# Patient Record
Sex: MALE | Race: AMERICAN INDIAN OR ALASKA NATIVE | HISPANIC OR LATINO | Employment: FULL TIME | URBAN - METROPOLITAN AREA
[De-identification: names, ages, dates, MRNs, and addresses within clinical notes are randomized per-mention and may not be internally consistent; named-entity substitution may affect disease eponyms.]

---

## 2023-01-22 ENCOUNTER — APPOINTMENT (OUTPATIENT)
Dept: RADIOLOGY | Facility: MEDICAL CENTER | Age: 58
End: 2023-01-22
Payer: COMMERCIAL

## 2023-01-22 ENCOUNTER — HOSPITAL ENCOUNTER (INPATIENT)
Facility: MEDICAL CENTER | Age: 58
LOS: 8 days | DRG: 442 | End: 2023-01-30
Attending: STUDENT IN AN ORGANIZED HEALTH CARE EDUCATION/TRAINING PROGRAM | Admitting: STUDENT IN AN ORGANIZED HEALTH CARE EDUCATION/TRAINING PROGRAM
Payer: COMMERCIAL

## 2023-01-22 DIAGNOSIS — R78.81 BACTEREMIA: ICD-10-CM

## 2023-01-22 DIAGNOSIS — K75.0 HEPATIC ABSCESS: ICD-10-CM

## 2023-01-22 DIAGNOSIS — E11.9 NEW ONSET TYPE 2 DIABETES MELLITUS (HCC): ICD-10-CM

## 2023-01-22 PROBLEM — R94.31 ABNORMAL EKG: Status: ACTIVE | Noted: 2023-01-22

## 2023-01-22 PROBLEM — E87.1 HYPONATREMIA: Status: ACTIVE | Noted: 2023-01-22

## 2023-01-22 PROBLEM — A41.9 SEPSIS (HCC): Status: ACTIVE | Noted: 2023-01-22

## 2023-01-22 PROBLEM — R73.9 HYPERGLYCEMIA: Status: ACTIVE | Noted: 2023-01-22

## 2023-01-22 PROBLEM — R79.89 POSITIVE D DIMER: Status: ACTIVE | Noted: 2023-01-22

## 2023-01-22 LAB
ALBUMIN SERPL BCP-MCNC: 3.1 G/DL (ref 3.2–4.9)
ALBUMIN/GLOB SERPL: 0.7 G/DL
ALP SERPL-CCNC: 84 U/L (ref 30–99)
ALT SERPL-CCNC: 37 U/L (ref 2–50)
ANION GAP SERPL CALC-SCNC: 13 MMOL/L (ref 7–16)
AST SERPL-CCNC: 19 U/L (ref 12–45)
BASOPHILS # BLD AUTO: 0.3 % (ref 0–1.8)
BASOPHILS # BLD: 0.03 K/UL (ref 0–0.12)
BILIRUB SERPL-MCNC: 0.7 MG/DL (ref 0.1–1.5)
BUN SERPL-MCNC: 15 MG/DL (ref 8–22)
CALCIUM ALBUM COR SERPL-MCNC: 9.7 MG/DL (ref 8.5–10.5)
CALCIUM SERPL-MCNC: 9 MG/DL (ref 8.5–10.5)
CHLORIDE SERPL-SCNC: 94 MMOL/L (ref 96–112)
CO2 SERPL-SCNC: 24 MMOL/L (ref 20–33)
CREAT SERPL-MCNC: 0.78 MG/DL (ref 0.5–1.4)
EOSINOPHIL # BLD AUTO: 0.02 K/UL (ref 0–0.51)
EOSINOPHIL NFR BLD: 0.2 % (ref 0–6.9)
ERYTHROCYTE [DISTWIDTH] IN BLOOD BY AUTOMATED COUNT: 41.7 FL (ref 35.9–50)
EST. AVERAGE GLUCOSE BLD GHB EST-MCNC: 286 MG/DL
GFR SERPLBLD CREATININE-BSD FMLA CKD-EPI: 102 ML/MIN/1.73 M 2
GLOBULIN SER CALC-MCNC: 4.2 G/DL (ref 1.9–3.5)
GLUCOSE BLD STRIP.AUTO-MCNC: 246 MG/DL (ref 65–99)
GLUCOSE SERPL-MCNC: 250 MG/DL (ref 65–99)
HBA1C MFR BLD: 11.6 % (ref 4–5.6)
HCT VFR BLD AUTO: 42 % (ref 42–52)
HGB BLD-MCNC: 13.8 G/DL (ref 14–18)
IMM GRANULOCYTES # BLD AUTO: 0.2 K/UL (ref 0–0.11)
IMM GRANULOCYTES NFR BLD AUTO: 1.7 % (ref 0–0.9)
INR PPP: 1.19 (ref 0.87–1.13)
LACTATE SERPL-SCNC: 1.4 MMOL/L (ref 0.5–2)
LYMPHOCYTES # BLD AUTO: 1.11 K/UL (ref 1–4.8)
LYMPHOCYTES NFR BLD: 9.6 % (ref 22–41)
MAGNESIUM SERPL-MCNC: 1.9 MG/DL (ref 1.5–2.5)
MCH RBC QN AUTO: 29.7 PG (ref 27–33)
MCHC RBC AUTO-ENTMCNC: 32.9 G/DL (ref 33.7–35.3)
MCV RBC AUTO: 90.3 FL (ref 81.4–97.8)
MONOCYTES # BLD AUTO: 0.74 K/UL (ref 0–0.85)
MONOCYTES NFR BLD AUTO: 6.4 % (ref 0–13.4)
NEUTROPHILS # BLD AUTO: 9.42 K/UL (ref 1.82–7.42)
NEUTROPHILS NFR BLD: 81.8 % (ref 44–72)
NRBC # BLD AUTO: 0 K/UL
NRBC BLD-RTO: 0 /100 WBC
PHOSPHATE SERPL-MCNC: 3 MG/DL (ref 2.5–4.5)
PLATELET # BLD AUTO: 279 K/UL (ref 164–446)
PMV BLD AUTO: 9.9 FL (ref 9–12.9)
POTASSIUM SERPL-SCNC: 4.4 MMOL/L (ref 3.6–5.5)
PROT SERPL-MCNC: 7.3 G/DL (ref 6–8.2)
PROTHROMBIN TIME: 15 SEC (ref 12–14.6)
RBC # BLD AUTO: 4.65 M/UL (ref 4.7–6.1)
SODIUM SERPL-SCNC: 131 MMOL/L (ref 135–145)
WBC # BLD AUTO: 11.5 K/UL (ref 4.8–10.8)

## 2023-01-22 PROCEDURE — 99222 1ST HOSP IP/OBS MODERATE 55: CPT | Mod: GC | Performed by: STUDENT IN AN ORGANIZED HEALTH CARE EDUCATION/TRAINING PROGRAM

## 2023-01-22 PROCEDURE — 80053 COMPREHEN METABOLIC PANEL: CPT

## 2023-01-22 PROCEDURE — 83605 ASSAY OF LACTIC ACID: CPT

## 2023-01-22 PROCEDURE — 85610 PROTHROMBIN TIME: CPT

## 2023-01-22 PROCEDURE — 87040 BLOOD CULTURE FOR BACTERIA: CPT

## 2023-01-22 PROCEDURE — 85025 COMPLETE CBC W/AUTO DIFF WBC: CPT

## 2023-01-22 PROCEDURE — 84100 ASSAY OF PHOSPHORUS: CPT

## 2023-01-22 PROCEDURE — 83735 ASSAY OF MAGNESIUM: CPT

## 2023-01-22 PROCEDURE — 700102 HCHG RX REV CODE 250 W/ 637 OVERRIDE(OP): Performed by: STUDENT IN AN ORGANIZED HEALTH CARE EDUCATION/TRAINING PROGRAM

## 2023-01-22 PROCEDURE — 770006 HCHG ROOM/CARE - MED/SURG/GYN SEMI*

## 2023-01-22 PROCEDURE — 83036 HEMOGLOBIN GLYCOSYLATED A1C: CPT

## 2023-01-22 PROCEDURE — 700111 HCHG RX REV CODE 636 W/ 250 OVERRIDE (IP): Performed by: STUDENT IN AN ORGANIZED HEALTH CARE EDUCATION/TRAINING PROGRAM

## 2023-01-22 PROCEDURE — 87077 CULTURE AEROBIC IDENTIFY: CPT

## 2023-01-22 PROCEDURE — 700105 HCHG RX REV CODE 258: Performed by: STUDENT IN AN ORGANIZED HEALTH CARE EDUCATION/TRAINING PROGRAM

## 2023-01-22 PROCEDURE — 36415 COLL VENOUS BLD VENIPUNCTURE: CPT

## 2023-01-22 PROCEDURE — 93005 ELECTROCARDIOGRAM TRACING: CPT | Performed by: STUDENT IN AN ORGANIZED HEALTH CARE EDUCATION/TRAINING PROGRAM

## 2023-01-22 PROCEDURE — 82962 GLUCOSE BLOOD TEST: CPT

## 2023-01-22 RX ORDER — POLYETHYLENE GLYCOL 3350 17 G/17G
1 POWDER, FOR SOLUTION ORAL
Status: DISCONTINUED | OUTPATIENT
Start: 2023-01-22 | End: 2023-01-30 | Stop reason: HOSPADM

## 2023-01-22 RX ORDER — PROCHLORPERAZINE EDISYLATE 5 MG/ML
5-10 INJECTION INTRAMUSCULAR; INTRAVENOUS EVERY 4 HOURS PRN
Status: DISCONTINUED | OUTPATIENT
Start: 2023-01-22 | End: 2023-01-30 | Stop reason: HOSPADM

## 2023-01-22 RX ORDER — ACETAMINOPHEN 325 MG/1
650 TABLET ORAL EVERY 6 HOURS PRN
Status: DISCONTINUED | OUTPATIENT
Start: 2023-01-22 | End: 2023-01-28

## 2023-01-22 RX ORDER — BISACODYL 10 MG
10 SUPPOSITORY, RECTAL RECTAL
Status: DISCONTINUED | OUTPATIENT
Start: 2023-01-22 | End: 2023-01-30 | Stop reason: HOSPADM

## 2023-01-22 RX ORDER — ONDANSETRON 2 MG/ML
4 INJECTION INTRAMUSCULAR; INTRAVENOUS EVERY 4 HOURS PRN
Status: DISCONTINUED | OUTPATIENT
Start: 2023-01-22 | End: 2023-01-30 | Stop reason: HOSPADM

## 2023-01-22 RX ORDER — PROMETHAZINE HYDROCHLORIDE 12.5 MG/1
12.5-25 SUPPOSITORY RECTAL EVERY 4 HOURS PRN
Status: DISCONTINUED | OUTPATIENT
Start: 2023-01-22 | End: 2023-01-30 | Stop reason: HOSPADM

## 2023-01-22 RX ORDER — SODIUM CHLORIDE, SODIUM LACTATE, POTASSIUM CHLORIDE, AND CALCIUM CHLORIDE .6; .31; .03; .02 G/100ML; G/100ML; G/100ML; G/100ML
1000 INJECTION, SOLUTION INTRAVENOUS ONCE
Status: COMPLETED | OUTPATIENT
Start: 2023-01-22 | End: 2023-01-22

## 2023-01-22 RX ORDER — PROMETHAZINE HYDROCHLORIDE 25 MG/1
12.5-25 TABLET ORAL EVERY 4 HOURS PRN
Status: DISCONTINUED | OUTPATIENT
Start: 2023-01-22 | End: 2023-01-30 | Stop reason: HOSPADM

## 2023-01-22 RX ORDER — ONDANSETRON 4 MG/1
4 TABLET, ORALLY DISINTEGRATING ORAL EVERY 4 HOURS PRN
Status: DISCONTINUED | OUTPATIENT
Start: 2023-01-22 | End: 2023-01-30 | Stop reason: HOSPADM

## 2023-01-22 RX ORDER — AMOXICILLIN 250 MG
2 CAPSULE ORAL 2 TIMES DAILY
Status: DISCONTINUED | OUTPATIENT
Start: 2023-01-22 | End: 2023-01-30 | Stop reason: HOSPADM

## 2023-01-22 RX ADMIN — INSULIN HUMAN 2 UNITS: 100 INJECTION, SOLUTION PARENTERAL at 21:16

## 2023-01-22 RX ADMIN — SODIUM CHLORIDE, POTASSIUM CHLORIDE, SODIUM LACTATE AND CALCIUM CHLORIDE 1000 ML: 600; 310; 30; 20 INJECTION, SOLUTION INTRAVENOUS at 21:05

## 2023-01-22 RX ADMIN — PIPERACILLIN AND TAZOBACTAM 4.5 G: 4; .5 INJECTION, POWDER, LYOPHILIZED, FOR SOLUTION INTRAVENOUS; PARENTERAL at 22:09

## 2023-01-22 ASSESSMENT — PATIENT HEALTH QUESTIONNAIRE - PHQ9
2. FEELING DOWN, DEPRESSED, IRRITABLE, OR HOPELESS: NOT AT ALL
1. LITTLE INTEREST OR PLEASURE IN DOING THINGS: NOT AT ALL
SUM OF ALL RESPONSES TO PHQ9 QUESTIONS 1 AND 2: 0

## 2023-01-22 ASSESSMENT — ENCOUNTER SYMPTOMS
SHORTNESS OF BREATH: 0
PALPITATIONS: 0
CHILLS: 0
COUGH: 0
STRIDOR: 0
BRUISES/BLEEDS EASILY: 0
HEADACHES: 0
CONSTIPATION: 0
DIZZINESS: 0
VOMITING: 0
MYALGIAS: 0
BACK PAIN: 0
FLANK PAIN: 1
POLYDIPSIA: 0
INSOMNIA: 0
DIARRHEA: 0
NAUSEA: 0
DOUBLE VISION: 0
FEVER: 1
ABDOMINAL PAIN: 1
BLURRED VISION: 0

## 2023-01-22 ASSESSMENT — COGNITIVE AND FUNCTIONAL STATUS - GENERAL
SUGGESTED CMS G CODE MODIFIER DAILY ACTIVITY: CI
DAILY ACTIVITIY SCORE: 23
MOBILITY SCORE: 23
CLIMB 3 TO 5 STEPS WITH RAILING: A LITTLE
DRESSING REGULAR LOWER BODY CLOTHING: A LITTLE
SUGGESTED CMS G CODE MODIFIER MOBILITY: CI

## 2023-01-22 ASSESSMENT — LIFESTYLE VARIABLES
DOES PATIENT WANT TO STOP DRINKING: NO
TOTAL SCORE: 0
EVER FELT BAD OR GUILTY ABOUT YOUR DRINKING: NO
HAVE YOU EVER FELT YOU SHOULD CUT DOWN ON YOUR DRINKING: NO
ON A TYPICAL DAY WHEN YOU DRINK ALCOHOL HOW MANY DRINKS DO YOU HAVE: 3
HOW MANY TIMES IN THE PAST YEAR HAVE YOU HAD 5 OR MORE DRINKS IN A DAY: 0
SUBSTANCE_ABUSE: 0
TOTAL SCORE: 0
HAVE PEOPLE ANNOYED YOU BY CRITICIZING YOUR DRINKING: NO
EVER HAD A DRINK FIRST THING IN THE MORNING TO STEADY YOUR NERVES TO GET RID OF A HANGOVER: NO
ALCOHOL_USE: YES
AVERAGE NUMBER OF DAYS PER WEEK YOU HAVE A DRINK CONTAINING ALCOHOL: 1
CONSUMPTION TOTAL: NEGATIVE
TOTAL SCORE: 0

## 2023-01-22 ASSESSMENT — PAIN DESCRIPTION - PAIN TYPE: TYPE: ACUTE PAIN

## 2023-01-23 ENCOUNTER — APPOINTMENT (OUTPATIENT)
Dept: RADIOLOGY | Facility: MEDICAL CENTER | Age: 58
DRG: 442 | End: 2023-01-23
Attending: STUDENT IN AN ORGANIZED HEALTH CARE EDUCATION/TRAINING PROGRAM
Payer: COMMERCIAL

## 2023-01-23 PROBLEM — E11.9 NEW ONSET TYPE 2 DIABETES MELLITUS (HCC): Status: ACTIVE | Noted: 2023-01-22

## 2023-01-23 PROBLEM — R93.89 ABNORMAL CT OF THE CHEST: Status: ACTIVE | Noted: 2023-01-22

## 2023-01-23 LAB
CHOLEST SERPL-MCNC: 143 MG/DL (ref 100–199)
EKG IMPRESSION: NORMAL
GLUCOSE BLD STRIP.AUTO-MCNC: 200 MG/DL (ref 65–99)
GLUCOSE BLD STRIP.AUTO-MCNC: 214 MG/DL (ref 65–99)
GLUCOSE BLD STRIP.AUTO-MCNC: 240 MG/DL (ref 65–99)
GLUCOSE BLD STRIP.AUTO-MCNC: 249 MG/DL (ref 65–99)
GRAM STN SPEC: NORMAL
HDLC SERPL-MCNC: 25 MG/DL
LDLC SERPL CALC-MCNC: 89 MG/DL
SIGNIFICANT IND 70042: NORMAL
SITE SITE: NORMAL
SOURCE SOURCE: NORMAL
TRIGL SERPL-MCNC: 145 MG/DL (ref 0–149)

## 2023-01-23 PROCEDURE — A9270 NON-COVERED ITEM OR SERVICE: HCPCS | Performed by: NURSE PRACTITIONER

## 2023-01-23 PROCEDURE — 87070 CULTURE OTHR SPECIMN AEROBIC: CPT

## 2023-01-23 PROCEDURE — 87205 SMEAR GRAM STAIN: CPT

## 2023-01-23 PROCEDURE — 36415 COLL VENOUS BLD VENIPUNCTURE: CPT

## 2023-01-23 PROCEDURE — 700111 HCHG RX REV CODE 636 W/ 250 OVERRIDE (IP): Performed by: NURSE PRACTITIONER

## 2023-01-23 PROCEDURE — 700111 HCHG RX REV CODE 636 W/ 250 OVERRIDE (IP)

## 2023-01-23 PROCEDURE — 700111 HCHG RX REV CODE 636 W/ 250 OVERRIDE (IP): Performed by: STUDENT IN AN ORGANIZED HEALTH CARE EDUCATION/TRAINING PROGRAM

## 2023-01-23 PROCEDURE — 770006 HCHG ROOM/CARE - MED/SURG/GYN SEMI*

## 2023-01-23 PROCEDURE — 93010 ELECTROCARDIOGRAM REPORT: CPT | Performed by: INTERNAL MEDICINE

## 2023-01-23 PROCEDURE — 0F9130Z DRAINAGE OF RIGHT LOBE LIVER WITH DRAINAGE DEVICE, PERCUTANEOUS APPROACH: ICD-10-PCS | Performed by: RADIOLOGY

## 2023-01-23 PROCEDURE — 700111 HCHG RX REV CODE 636 W/ 250 OVERRIDE (IP): Performed by: RADIOLOGY

## 2023-01-23 PROCEDURE — 80061 LIPID PANEL: CPT

## 2023-01-23 PROCEDURE — 87077 CULTURE AEROBIC IDENTIFY: CPT

## 2023-01-23 PROCEDURE — A9270 NON-COVERED ITEM OR SERVICE: HCPCS | Performed by: STUDENT IN AN ORGANIZED HEALTH CARE EDUCATION/TRAINING PROGRAM

## 2023-01-23 PROCEDURE — 99152 MOD SED SAME PHYS/QHP 5/>YRS: CPT

## 2023-01-23 PROCEDURE — 99232 SBSQ HOSP IP/OBS MODERATE 35: CPT | Performed by: INTERNAL MEDICINE

## 2023-01-23 PROCEDURE — 700102 HCHG RX REV CODE 250 W/ 637 OVERRIDE(OP): Performed by: NURSE PRACTITIONER

## 2023-01-23 PROCEDURE — 700102 HCHG RX REV CODE 250 W/ 637 OVERRIDE(OP): Performed by: STUDENT IN AN ORGANIZED HEALTH CARE EDUCATION/TRAINING PROGRAM

## 2023-01-23 PROCEDURE — 700105 HCHG RX REV CODE 258: Performed by: STUDENT IN AN ORGANIZED HEALTH CARE EDUCATION/TRAINING PROGRAM

## 2023-01-23 PROCEDURE — 82962 GLUCOSE BLOOD TEST: CPT | Mod: 91

## 2023-01-23 RX ORDER — MIDAZOLAM HYDROCHLORIDE 1 MG/ML
.5-2 INJECTION INTRAMUSCULAR; INTRAVENOUS PRN
Status: ACTIVE | OUTPATIENT
Start: 2023-01-23 | End: 2023-01-23

## 2023-01-23 RX ORDER — MIDAZOLAM HYDROCHLORIDE 1 MG/ML
INJECTION INTRAMUSCULAR; INTRAVENOUS
Status: COMPLETED
Start: 2023-01-23 | End: 2023-01-23

## 2023-01-23 RX ORDER — ONDANSETRON 2 MG/ML
4 INJECTION INTRAMUSCULAR; INTRAVENOUS PRN
Status: ACTIVE | OUTPATIENT
Start: 2023-01-23 | End: 2023-01-23

## 2023-01-23 RX ORDER — HYDROMORPHONE HYDROCHLORIDE 1 MG/ML
0.5 INJECTION, SOLUTION INTRAMUSCULAR; INTRAVENOUS; SUBCUTANEOUS ONCE
Status: COMPLETED | OUTPATIENT
Start: 2023-01-23 | End: 2023-01-23

## 2023-01-23 RX ORDER — SODIUM CHLORIDE 9 MG/ML
500 INJECTION, SOLUTION INTRAVENOUS
Status: ACTIVE | OUTPATIENT
Start: 2023-01-23 | End: 2023-01-23

## 2023-01-23 RX ORDER — HYDROCODONE BITARTRATE AND ACETAMINOPHEN 5; 325 MG/1; MG/1
1 TABLET ORAL EVERY 6 HOURS PRN
Status: DISCONTINUED | OUTPATIENT
Start: 2023-01-23 | End: 2023-01-24

## 2023-01-23 RX ADMIN — PIPERACILLIN AND TAZOBACTAM 4.5 G: 4; .5 INJECTION, POWDER, LYOPHILIZED, FOR SOLUTION INTRAVENOUS; PARENTERAL at 00:30

## 2023-01-23 RX ADMIN — HYDROCODONE BITARTRATE AND ACETAMINOPHEN 1 TABLET: 5; 325 TABLET ORAL at 21:06

## 2023-01-23 RX ADMIN — MIDAZOLAM HYDROCHLORIDE 1 MG: 1 INJECTION, SOLUTION INTRAMUSCULAR; INTRAVENOUS at 17:15

## 2023-01-23 RX ADMIN — PIPERACILLIN AND TAZOBACTAM 4.5 G: 4; .5 INJECTION, POWDER, LYOPHILIZED, FOR SOLUTION INTRAVENOUS; PARENTERAL at 05:45

## 2023-01-23 RX ADMIN — INSULIN HUMAN 1 UNITS: 100 INJECTION, SOLUTION PARENTERAL at 18:09

## 2023-01-23 RX ADMIN — MIDAZOLAM HYDROCHLORIDE 1 MG: 1 INJECTION, SOLUTION INTRAMUSCULAR; INTRAVENOUS at 17:07

## 2023-01-23 RX ADMIN — INSULIN HUMAN 2 UNITS: 100 INJECTION, SOLUTION PARENTERAL at 08:34

## 2023-01-23 RX ADMIN — INSULIN HUMAN 2 UNITS: 100 INJECTION, SOLUTION PARENTERAL at 21:19

## 2023-01-23 RX ADMIN — FENTANYL CITRATE 50 MCG: 50 INJECTION, SOLUTION INTRAMUSCULAR; INTRAVENOUS at 17:08

## 2023-01-23 RX ADMIN — FENTANYL CITRATE 50 MCG: 50 INJECTION, SOLUTION INTRAMUSCULAR; INTRAVENOUS at 17:15

## 2023-01-23 RX ADMIN — PIPERACILLIN AND TAZOBACTAM 4.5 G: 4; .5 INJECTION, POWDER, LYOPHILIZED, FOR SOLUTION INTRAVENOUS; PARENTERAL at 21:16

## 2023-01-23 RX ADMIN — INSULIN HUMAN 2 UNITS: 100 INJECTION, SOLUTION PARENTERAL at 12:29

## 2023-01-23 RX ADMIN — SENNOSIDES AND DOCUSATE SODIUM 2 TABLET: 50; 8.6 TABLET ORAL at 18:09

## 2023-01-23 RX ADMIN — HYDROMORPHONE HYDROCHLORIDE 0.5 MG: 1 INJECTION, SOLUTION INTRAMUSCULAR; INTRAVENOUS; SUBCUTANEOUS at 18:28

## 2023-01-23 RX ADMIN — PIPERACILLIN AND TAZOBACTAM 4.5 G: 4; .5 INJECTION, POWDER, LYOPHILIZED, FOR SOLUTION INTRAVENOUS; PARENTERAL at 12:37

## 2023-01-23 ASSESSMENT — PAIN DESCRIPTION - PAIN TYPE
TYPE: ACUTE PAIN
TYPE: ACUTE PAIN

## 2023-01-23 ASSESSMENT — ENCOUNTER SYMPTOMS
ABDOMINAL PAIN: 1
FEVER: 0
NAUSEA: 1
ROS GI COMMENTS: POOR APPETITE
VOMITING: 0
CHILLS: 0

## 2023-01-23 NOTE — H&P
"[facility] Internal Medicine History & Physical Note    Date of Service  1/22/2023    [facility] Team: Night team  Attending: Ashutosh Pulido M.d.  Senior Resident: Dr. Quiñonez  Intern:  n/a  Contact Number: [phone]    Primary Care Physician  No primary care provider on file.    Consultants  IR - consult order entered    Specialist Names: n/a    Code Status  Full Code    Chief Complaint  Right-sided abdominal pain, fatigue, and subjective fever    History of Presenting Illness (HPI):   Tejas Yusuf is a 57 y.o. male with a past medical history of uncomplicated diverticulitis in the 2000's who presented 1/22/2023 with right-sided abdominal pain, fatigue, and subjective fever.  Of note, he has not seen a physician for \"many years.\"  Initial symptom was fatigue onset about 1 week ago.  3 days later developed sharp, nonradiating, right-sided flank/abdominal pain.  He then began experiencing subjective fevers and diminished appetite.  He denies any nausea, vomiting, weight loss, constipation, or diarrhea.  Last bowel movement was this morning.  Denies any melena or hematochezia.  He denies any dysuria or urethral discharge however has noticed increased frequency over the past 1 to 2 days.  He has been drinking \"gallons\" of fruit juice.  He denies any significant alcohol use; drinks 2 glasses of wine per week.  Denies any recent foreign travel or sick contacts.    At the outside ED, he was found to be septic with fever up to 101, WBC 14, and glucose 373.  UA was positive for ketones but negative for UTI.  CT abdomen pelvis with contrast there revealed a 6.5 cm hypodense liver lesion concerning for an abscess.  They also performed a CT PE (high D-dimer) which was negative for pulmonary embolism or aortic dissection.  He was given 2 L NS bolus and started on Zosyn prior to transfer here.    I discussed the plan of care with patient, bedside RN, and Dr. Pulido .    Review of Systems  Review of Systems   Constitutional:  Positive for " fever and malaise/fatigue. Negative for chills.   HENT:  Negative for congestion and hearing loss.    Eyes:  Negative for blurred vision and double vision.   Respiratory:  Negative for cough, shortness of breath and stridor.    Cardiovascular:  Negative for chest pain and palpitations.   Gastrointestinal:  Positive for abdominal pain (right). Negative for constipation, diarrhea, nausea and vomiting.   Genitourinary:  Positive for flank pain (right) and frequency. Negative for dysuria and urgency.   Musculoskeletal:  Negative for back pain and myalgias.   Skin:  Negative for itching and rash.   Neurological:  Negative for dizziness and headaches.   Endo/Heme/Allergies:  Negative for polydipsia. Does not bruise/bleed easily.   Psychiatric/Behavioral:  Negative for substance abuse. The patient does not have insomnia.      Past Medical History   has no past medical history on file.  Uncomplicated diverticulitis and the 2000's    Surgical History   has no past surgical history on file.   Denies prior surgery    Family History  family history is not on file.   Reports hematochromatosis in father and brother  Family history reviewed with patient.     Social History  Tobacco: Denies  Alcohol: 2 glasses of wine per week  Recreational drugs (illegal or prescription): Denies  Employment: n/a  Living Situation: n/a  Recent Travel: Denies  Primary Care Provider: Reviewed Doesn't have a PCP  Other (stressors, spirituality, exposures): n/a    Allergies  No Known Allergies    Medications  None       Physical Exam  Temp:  [37.7 °C (99.8 °F)] 37.7 °C (99.8 °F)  Pulse:  [97] 97  Resp:  [18] 18  BP: (140)/(81) 140/81  SpO2:  [95 %] 95 %                          Physical Exam  Constitutional:       General: He is not in acute distress.     Appearance: He is obese. He is not diaphoretic.   HENT:      Head: Normocephalic and atraumatic.      Right Ear: External ear normal.      Left Ear: External ear normal.      Nose: Nose normal.       Mouth/Throat:      Mouth: Mucous membranes are moist.      Pharynx: Oropharynx is clear.   Eyes:      General: No scleral icterus.     Extraocular Movements: Extraocular movements intact.   Cardiovascular:      Rate and Rhythm: Regular rhythm. Tachycardia present.      Heart sounds: No murmur heard.  Pulmonary:      Effort: Pulmonary effort is normal. No respiratory distress.      Breath sounds: No wheezing or rales.   Abdominal:      Palpations: Abdomen is soft.      Tenderness: There is no abdominal tenderness. There is right CVA tenderness. There is no guarding or rebound.      Hernia: A hernia (umbilical hernia; nontender, soft) is present.      Comments: Toro's negative   Musculoskeletal:      Cervical back: Normal range of motion and neck supple.      Right lower leg: Edema (scant) present.      Left lower leg: Edema (scant) present.   Skin:     General: Skin is warm and dry.      Capillary Refill: Capillary refill takes less than 2 seconds.      Coloration: Skin is not jaundiced or pale.   Neurological:      Mental Status: He is alert and oriented to person, place, and time.      Coordination: Coordination normal.   Psychiatric:         Behavior: Behavior normal.         Thought Content: Thought content normal.       Laboratory:          No results for input(s): ALTSGPT, ASTSGOT, ALKPHOSPHAT, TBILIRUBIN, DBILIRUBIN, GAMMAGT, AMYLASE, LIPASE, ALB, PREALBUMIN, GLUCOSE in the last 72 hours.      No results for input(s): NTPROBNP in the last 72 hours.      No results for input(s): TROPONINT in the last 72 hours.    Imaging:  OUTSIDE IMAGES-CT CHEST   Final Result      OUTSIDE IMAGES-CT ABDOMEN /PELVIS   Final Result      IR-CONSULT AND TREAT    (Results Pending)       EKG:  I have personally reviewed the images and compared with prior images. and My impression is: Sinus, extreme axis, <1mm TERI in AVR, AVF, peaked T waves in V2, V3, V4, V5    Assessment/Plan:  Problem Representation:   Tejas Yusuf is a 57 y.o.  "male who hasn't seen a PCP in years with a past medical history of uncomplicated diverticulitis in the 2000's who presented to [facility] on 1/22/2023 with a 1 week history of right-sided, nonradiating flank pain, fatigue, and subjective fever.  Found to be septic with a 6.5 cm hypodense liver lesion concerning for a pyogenic liver abscess.  Transferred to [facility] on 1/22/2023 for evaluation for abscess drainage.  He is hemodynamically stable.    I anticipate this patient will require at least two midnights for appropriate medical management, necessitating inpatient admission because sepsis due to liver abscess    Patient will need a Med/Surg bed on MEDICAL service .  The need is secondary to sepsis.    * Hepatic abscess- (present on admission)  Assessment & Plan  Likely pyogenic liver abscess.  Differential includes malignancy.  1/22/2023 outside CT A/P with contrast: \"6.5 cm hypodense lesion posterior segment right lobe liver dome segment 7 representing abscess, hemangioma, metastatic disease, adenoma or hepatocellular carcinoma.\"    Continue Zosyn  IR consult for drainage ordered  NPO at [facility]    Sepsis (HCC)  Assessment & Plan  This is Sepsis Present on admission  SIRS criteria identified on my evaluation include: Tachycardia, with heart rate greater than 90 BPM and Leukocytosis, with WBC greater than 12,000  Source is liver abscess  Sepsis protocol initiated  Fluid resuscitation ordered per protocol  Crystalloid Fluid Administration: Fluid resuscitation ordered per standard protocol - 30 mL/kg per current or ideal body weight  IV antibiotics as appropriate for source of sepsis  Reassessment: I have reassessed the patient's hemodynamic status  Outside PCR SARS-CoV-2, RSV, influenza A/B negative  Outside UA noninfectious    Check lactic  Blood cultures ordered    Abnormal CT of the chest  Assessment & Plan  Outside CT showing minimal pericardial thickening anteriorly from mild pericarditis versus old " inflammation  Likely incidental finding  Slight inferior TERI on outside EKG with negative troponin  Denies chest pain, pressure, or dysnpea    If he develops chest pain, repeat ekg/trop    Positive D dimer  Assessment & Plan  Elevated D-dimer on outside labs  Outside CTA chest negative for PE    Hyponatremia  Assessment & Plan  Denies neurologic complaints  Likely due to hypovolemia  Trend labs. Workup if no improvement after fluids.    New onset type 2 diabetes mellitus (HCC)  Assessment & Plan  Elevated sugars on labs  No known hx of DM  A1c 11.6 this admission  SSI for now since he'll be NPO, accuchecks, goal sugars 140-180  Consider starting long acting insulin tomorrow  Hypoglycemia protocol  Diabetes education ordered        VTE prophylaxis: SCDs/TEDs

## 2023-01-23 NOTE — CARE PLAN
The patient is Stable - Low risk of patient condition declining or worsening    Shift Goals  Clinical Goals: IV abx  Patient Goals: rest  Family Goals: XOCHITL    Progress made toward(s) clinical / shift goals:  pt did not sustain injuries this shift, pt meds passed per MAR    Problem: Knowledge Deficit - Standard  Goal: Patient and family/care givers will demonstrate understanding of plan of care, disease process/condition, diagnostic tests and medications  Outcome: Progressing     Problem: Pain - Standard  Goal: Alleviation of pain or a reduction in pain to the patient’s comfort goal  Outcome: Progressing       Patient is not progressing towards the following goals:

## 2023-01-23 NOTE — CARE PLAN
The patient is Stable - Low risk of patient condition declining or worsening    Shift Goals  Clinical Goals: IV ATB  Patient Goals: Rest    Progress made toward(s) clinical / shift goals:  Pt received AxOx4, all VSS. See flowsheet for assessment details. All meds given as order. Diabetes consult was ordered and pt needs diabetes education. Will continue to monitor.

## 2023-01-23 NOTE — ASSESSMENT & PLAN NOTE
Elevated sugars on labs  No known hx of DM  A1c 11.6 this admission  SSI for now since he'll be NPO, accuchecks, goal sugars 140-180  Consider starting long acting insulin tomorrow  Hypoglycemia protocol  Diabetes education ordered

## 2023-01-23 NOTE — ASSESSMENT & PLAN NOTE
Outside CT showing minimal pericardial thickening anteriorly from mild pericarditis versus old inflammation  Likely incidental finding  Slight inferior TERI on outside EKG with negative troponin  Denies chest pain, pressure, or dysnpea    If he develops chest pain, repeat ekg/trop

## 2023-01-23 NOTE — ASSESSMENT & PLAN NOTE
This is Sepsis Present on admission  SIRS criteria identified on my evaluation include: Tachycardia, with heart rate greater than 90 BPM and Leukocytosis, with WBC greater than 12,000  Source is liver abscess  Sepsis protocol initiated  Fluid resuscitation ordered per protocol  Crystalloid Fluid Administration: Fluid resuscitation ordered per standard protocol - 30 mL/kg per current or ideal body weight  IV antibiotics as appropriate for source of sepsis  Reassessment: I have reassessed the patient's hemodynamic status  Outside PCR SARS-CoV-2, RSV, influenza A/B negative  Outside UA noninfectious    Check lactic  Blood cultures ordered

## 2023-01-23 NOTE — PROGRESS NOTES
[facility]PITAL[facility] TRIAGE OFFICER DIRECT ADMISSION REPORT  Transferring facility: [facility]  Transferring physician: Dr Gaming  Transferring facility/physician contact number: [phone]  Chief complaint: Liver abscess  Pertinent history & patient course:   Is a 57-year-old gentleman with a past medical history of diverticulitis (distant hx) who presented to outside hospital with 1 week of progressive right upper quadrant lower right chest pain, general malaise and persistent fever.  CT scan was done which shows a 6.5 cm hypodense lesion in the right lobe of the liver concerning for possible hepatic abscess.  Current facility does not have capabilities of IR drainage or ID/GI/surgical services thus requesting transfer for higher level of care.    Pertinent imaging & lab results:   Patient is hemodynamically stable, fever 101, WBC of 14, normal liver function tests, no other identified lab abnormalities aside from a glucose of 373 without a prior diagnosis of diabetes.  Did have an elevated D-dimer of 11.9, underwent CTA which was negative for pulmonary embolism.  -Noted to have some peaked T waves and minimal ST elevation on EKG suggestive of pericarditis however patient has no chest pain and a negative troponin  Code Status: Full per transferring provider, I personally verified with the transferring provider patient's code status and the transferring provider has confirmed this with the patient.  Further work up or recommendations per triage officer prior to transfer:   -Recommend MD to get blood cultures and initiate antibiotic therapy with Zosyn  -Treat hyperglycemia  Consultants called prior to transfer and pertinent input from consultants: N/A  Patient accepted for transfer: Yes  Consultants to be called upon arrival:   Patient may need IR for drainage, ID consultation  Admission status: Inpatient.   Floor requested: [facility]  If ICU transfer, name of intensivist case discussed with and  pertinent input from critical care: N/A    Please inform the triage officer upon arrival of the patient to [facility] for assignment of a hospitalist to perform admission.     For any question or concerns regarding the care of this patient, please reach out to the assigned hospitalist.

## 2023-01-23 NOTE — ASSESSMENT & PLAN NOTE
"Likely pyogenic liver abscess.  Differential includes malignancy.  1/22/2023 outside CT A/P with contrast: \"6.5 cm hypodense lesion posterior segment right lobe liver dome segment 7 representing abscess, hemangioma, metastatic disease, adenoma or hepatocellular carcinoma.\"    Continue Zosyn  IR consult for drainage ordered  NPO at [facility]  "

## 2023-01-23 NOTE — ASSESSMENT & PLAN NOTE
Denies neurologic complaints  Likely due to hypovolemia  Trend labs. Workup if no improvement after fluids.

## 2023-01-24 PROBLEM — R74.8 ELEVATED LIVER ENZYMES: Status: ACTIVE | Noted: 2023-01-24

## 2023-01-24 LAB
ALBUMIN SERPL BCP-MCNC: 2.9 G/DL (ref 3.2–4.9)
ALBUMIN/GLOB SERPL: 0.8 G/DL
ALP SERPL-CCNC: 92 U/L (ref 30–99)
ALT SERPL-CCNC: 61 U/L (ref 2–50)
ANION GAP SERPL CALC-SCNC: 10 MMOL/L (ref 7–16)
AST SERPL-CCNC: 68 U/L (ref 12–45)
BASOPHILS # BLD AUTO: 0.4 % (ref 0–1.8)
BASOPHILS # BLD: 0.04 K/UL (ref 0–0.12)
BILIRUB SERPL-MCNC: 0.5 MG/DL (ref 0.1–1.5)
BUN SERPL-MCNC: 13 MG/DL (ref 8–22)
CALCIUM ALBUM COR SERPL-MCNC: 9.6 MG/DL (ref 8.5–10.5)
CALCIUM SERPL-MCNC: 8.7 MG/DL (ref 8.5–10.5)
CHLORIDE SERPL-SCNC: 97 MMOL/L (ref 96–112)
CO2 SERPL-SCNC: 26 MMOL/L (ref 20–33)
CREAT SERPL-MCNC: 0.74 MG/DL (ref 0.5–1.4)
EOSINOPHIL # BLD AUTO: 0.07 K/UL (ref 0–0.51)
EOSINOPHIL NFR BLD: 0.6 % (ref 0–6.9)
ERYTHROCYTE [DISTWIDTH] IN BLOOD BY AUTOMATED COUNT: 42.4 FL (ref 35.9–50)
GFR SERPLBLD CREATININE-BSD FMLA CKD-EPI: 104 ML/MIN/1.73 M 2
GLOBULIN SER CALC-MCNC: 3.7 G/DL (ref 1.9–3.5)
GLUCOSE BLD STRIP.AUTO-MCNC: 216 MG/DL (ref 65–99)
GLUCOSE BLD STRIP.AUTO-MCNC: 252 MG/DL (ref 65–99)
GLUCOSE BLD STRIP.AUTO-MCNC: 306 MG/DL (ref 65–99)
GLUCOSE SERPL-MCNC: 215 MG/DL (ref 65–99)
HCT VFR BLD AUTO: 43.4 % (ref 42–52)
HGB BLD-MCNC: 14.3 G/DL (ref 14–18)
IMM GRANULOCYTES # BLD AUTO: 0.35 K/UL (ref 0–0.11)
IMM GRANULOCYTES NFR BLD AUTO: 3.1 % (ref 0–0.9)
LYMPHOCYTES # BLD AUTO: 1.44 K/UL (ref 1–4.8)
LYMPHOCYTES NFR BLD: 12.9 % (ref 22–41)
MCH RBC QN AUTO: 30.2 PG (ref 27–33)
MCHC RBC AUTO-ENTMCNC: 32.9 G/DL (ref 33.7–35.3)
MCV RBC AUTO: 91.6 FL (ref 81.4–97.8)
MONOCYTES # BLD AUTO: 0.69 K/UL (ref 0–0.85)
MONOCYTES NFR BLD AUTO: 6.2 % (ref 0–13.4)
NEUTROPHILS # BLD AUTO: 8.61 K/UL (ref 1.82–7.42)
NEUTROPHILS NFR BLD: 76.8 % (ref 44–72)
NRBC # BLD AUTO: 0 K/UL
NRBC BLD-RTO: 0 /100 WBC
PLATELET # BLD AUTO: 287 K/UL (ref 164–446)
PMV BLD AUTO: 10 FL (ref 9–12.9)
POTASSIUM SERPL-SCNC: 4.2 MMOL/L (ref 3.6–5.5)
PROT SERPL-MCNC: 6.6 G/DL (ref 6–8.2)
RBC # BLD AUTO: 4.74 M/UL (ref 4.7–6.1)
SODIUM SERPL-SCNC: 133 MMOL/L (ref 135–145)
WBC # BLD AUTO: 11.2 K/UL (ref 4.8–10.8)

## 2023-01-24 PROCEDURE — 80053 COMPREHEN METABOLIC PANEL: CPT

## 2023-01-24 PROCEDURE — A9270 NON-COVERED ITEM OR SERVICE: HCPCS | Performed by: STUDENT IN AN ORGANIZED HEALTH CARE EDUCATION/TRAINING PROGRAM

## 2023-01-24 PROCEDURE — 700111 HCHG RX REV CODE 636 W/ 250 OVERRIDE (IP): Performed by: STUDENT IN AN ORGANIZED HEALTH CARE EDUCATION/TRAINING PROGRAM

## 2023-01-24 PROCEDURE — 36415 COLL VENOUS BLD VENIPUNCTURE: CPT

## 2023-01-24 PROCEDURE — 82962 GLUCOSE BLOOD TEST: CPT

## 2023-01-24 PROCEDURE — 700102 HCHG RX REV CODE 250 W/ 637 OVERRIDE(OP): Performed by: STUDENT IN AN ORGANIZED HEALTH CARE EDUCATION/TRAINING PROGRAM

## 2023-01-24 PROCEDURE — 700102 HCHG RX REV CODE 250 W/ 637 OVERRIDE(OP): Performed by: NURSE PRACTITIONER

## 2023-01-24 PROCEDURE — 770006 HCHG ROOM/CARE - MED/SURG/GYN SEMI*

## 2023-01-24 PROCEDURE — 85025 COMPLETE CBC W/AUTO DIFF WBC: CPT

## 2023-01-24 PROCEDURE — 700105 HCHG RX REV CODE 258: Performed by: STUDENT IN AN ORGANIZED HEALTH CARE EDUCATION/TRAINING PROGRAM

## 2023-01-24 PROCEDURE — 99232 SBSQ HOSP IP/OBS MODERATE 35: CPT | Performed by: STUDENT IN AN ORGANIZED HEALTH CARE EDUCATION/TRAINING PROGRAM

## 2023-01-24 PROCEDURE — A9270 NON-COVERED ITEM OR SERVICE: HCPCS | Performed by: NURSE PRACTITIONER

## 2023-01-24 RX ORDER — HYDROCODONE BITARTRATE AND ACETAMINOPHEN 5; 325 MG/1; MG/1
1-2 TABLET ORAL EVERY 6 HOURS PRN
Status: COMPLETED | OUTPATIENT
Start: 2023-01-24 | End: 2023-01-26

## 2023-01-24 RX ORDER — HYDROMORPHONE HYDROCHLORIDE 1 MG/ML
1 INJECTION, SOLUTION INTRAMUSCULAR; INTRAVENOUS; SUBCUTANEOUS EVERY 8 HOURS PRN
Status: DISCONTINUED | OUTPATIENT
Start: 2023-01-24 | End: 2023-01-30 | Stop reason: HOSPADM

## 2023-01-24 RX ORDER — ENOXAPARIN SODIUM 100 MG/ML
40 INJECTION SUBCUTANEOUS DAILY
Status: DISCONTINUED | OUTPATIENT
Start: 2023-01-24 | End: 2023-01-30 | Stop reason: HOSPADM

## 2023-01-24 RX ADMIN — HYDROCODONE BITARTRATE AND ACETAMINOPHEN 1 TABLET: 5; 325 TABLET ORAL at 09:01

## 2023-01-24 RX ADMIN — PIPERACILLIN AND TAZOBACTAM 4.5 G: 4; .5 INJECTION, POWDER, LYOPHILIZED, FOR SOLUTION INTRAVENOUS; PARENTERAL at 13:47

## 2023-01-24 RX ADMIN — PIPERACILLIN AND TAZOBACTAM 4.5 G: 4; .5 INJECTION, POWDER, LYOPHILIZED, FOR SOLUTION INTRAVENOUS; PARENTERAL at 04:42

## 2023-01-24 RX ADMIN — ACETAMINOPHEN 650 MG: 325 TABLET ORAL at 04:50

## 2023-01-24 RX ADMIN — INSULIN HUMAN 4 UNITS: 100 INJECTION, SOLUTION PARENTERAL at 18:15

## 2023-01-24 RX ADMIN — INSULIN HUMAN 3 UNITS: 100 INJECTION, SOLUTION PARENTERAL at 12:10

## 2023-01-24 RX ADMIN — INSULIN HUMAN 4 UNITS: 100 INJECTION, SOLUTION PARENTERAL at 20:57

## 2023-01-24 RX ADMIN — INSULIN GLARGINE-YFGN 10 UNITS: 100 INJECTION, SOLUTION SUBCUTANEOUS at 18:15

## 2023-01-24 RX ADMIN — ENOXAPARIN SODIUM 40 MG: 40 INJECTION SUBCUTANEOUS at 18:13

## 2023-01-24 RX ADMIN — PIPERACILLIN AND TAZOBACTAM 4.5 G: 4; .5 INJECTION, POWDER, LYOPHILIZED, FOR SOLUTION INTRAVENOUS; PARENTERAL at 20:53

## 2023-01-24 RX ADMIN — SENNOSIDES AND DOCUSATE SODIUM 2 TABLET: 50; 8.6 TABLET ORAL at 21:05

## 2023-01-24 RX ADMIN — HYDROMORPHONE HYDROCHLORIDE 1 MG: 1 INJECTION, SOLUTION INTRAMUSCULAR; INTRAVENOUS; SUBCUTANEOUS at 13:46

## 2023-01-24 RX ADMIN — INSULIN HUMAN 2 UNITS: 100 INJECTION, SOLUTION PARENTERAL at 08:40

## 2023-01-24 ASSESSMENT — PAIN DESCRIPTION - PAIN TYPE
TYPE: ACUTE PAIN

## 2023-01-24 ASSESSMENT — ENCOUNTER SYMPTOMS
FEVER: 0
SHORTNESS OF BREATH: 0
HEADACHES: 0
VOMITING: 0
CHILLS: 0
ROS GI COMMENTS: POOR APPETITE
ABDOMINAL PAIN: 1
DEPRESSION: 0
WHEEZING: 0
NAUSEA: 1
EYE PAIN: 0

## 2023-01-24 ASSESSMENT — FIBROSIS 4 INDEX: FIB4 SCORE: 1.79

## 2023-01-24 NOTE — OR SURGEON
Immediate Post- Operative Note        PostOp Diagnosis: LIVER ABSCESS      Procedure(s): CT GUIDED CATHETER DRAINAGE RIGHT LOBE LIVER ABSCESS SEGMENT 7 NEAR DOME POSTERIORLY    10F LOCKING LOOP PIGTAIL    EVACUATION: 65 ML BEIGE BLOOD SWIRLED PUS    SPECIMEN OF 20 ML SENT FOR C+S, GRAM    FINDINGS: CATHETER IN GOOD POSITION IN [employer] ARE[employer]      Estimated Blood Loss: <5 CC        Complications: NONE        1/23/2023     5:41 PM     Salas Clemons M.D.

## 2023-01-24 NOTE — PROGRESS NOTES
Pt. Received in bed awake, orientedx4. With complaints of mild to mod pain on RUQ. Intact SANDER drain on RUQ draining serosanguinous output, SANDER to bulb suction. PIV intact. Pt. On IV antibiotics. Educated about fall risks and prec. Blood sugar checked. Due meds given. Needs attended.

## 2023-01-24 NOTE — PROGRESS NOTES
[facility] Medicine Daily Progress Note    Date of Service  1/24/2023    Chief Complaint  Tejas Yusuf is a 57 y.o. male 1/22/2023  transferred to our facility for higher level of care for hepatic abscess.     Hospital Course  Teajs Yusuf is a 57 y.o. male admitted 1/22/2023 with no significant past medical history found to have a cait-hepatic abscess and transferred to our facility for higher level of care. Abscess-pain started 1 week ago, poor appetite, progressively worse. No prior issues. No foreign travel or odd food ingestions.     Interval Problem Update  Reported more pain, increased pain meds  Denies nausea vomiting.  No bowel movement for 2 days    S/p drain placement by IR 1/23, culture pending  Continue Zosyn    Sodium 131 -133  AST ALT 68/61, was normal at admission  WBC 11.2  A1c 11.6    I have discussed this patient's plan of care and discharge plan at IDT rounds today with Case Management, Nursing, Nursing leadership, and other members of the IDT team.    Consultants/Specialty  IR    Code Status  Full Code    Disposition  Patient is not medically cleared for discharge.   Anticipate discharge to to home with close outpatient follow-up.  I have placed the appropriate orders for post-discharge needs.    Review of Systems  Review of Systems   Constitutional:  Positive for malaise/fatigue. Negative for chills and fever.   HENT:  Negative for ear pain.    Eyes:  Negative for pain.   Respiratory:  Negative for shortness of breath and wheezing.    Cardiovascular:  Negative for chest pain and leg swelling.   Gastrointestinal:  Positive for abdominal pain and nausea. Negative for vomiting.        Poor appetite   Genitourinary:  Negative for dysuria, frequency and urgency.   Neurological:  Negative for headaches.   Psychiatric/Behavioral:  Negative for depression.    All other systems reviewed and are negative.     Physical Exam  Temp:  [36.2 °C (97.1 °F)-37.7 °C (99.9 °F)] 36.4 °C (97.6 °F)  Pulse:  [68-89]  87  Resp:  [16-25] 18  BP: (104-139)/(61-77) 122/66  SpO2:  [92 %-98 %] 98 %    Physical Exam  Vitals and nursing note reviewed.   Constitutional:       General: He is not in acute distress.     Appearance: He is well-developed.      Comments: Comfortable appearing     HENT:      Head: Normocephalic and atraumatic.      Mouth/Throat:      Pharynx: No oropharyngeal exudate.   Eyes:      General: No scleral icterus.     Pupils: Pupils are equal, round, and reactive to light.   Neck:      Thyroid: No thyromegaly.   Cardiovascular:      Rate and Rhythm: Normal rate and regular rhythm.      Heart sounds: Normal heart sounds. No murmur heard.  Pulmonary:      Effort: Pulmonary effort is normal. No respiratory distress.      Breath sounds: Normal breath sounds. No wheezing.   Abdominal:      General: Bowel sounds are normal. There is no distension.      Palpations: Abdomen is soft.      Tenderness: There is abdominal tenderness (mild RUQ).   Musculoskeletal:         General: No tenderness. Normal range of motion.      Cervical back: Normal range of motion and neck supple.   Skin:     General: Skin is warm and dry.      Findings: No rash.   Neurological:      Mental Status: He is alert and oriented to person, place, and time.      Cranial Nerves: No cranial nerve deficit.       Fluids    Intake/Output Summary (Last 24 hours) at 1/24/2023 1311  Last data filed at 1/24/2023 0900  Gross per 24 hour   Intake 480 ml   Output 530 ml   Net -50 ml       Laboratory  Recent Labs     01/22/23 2047 01/24/23  0041   WBC 11.5* 11.2*   RBC 4.65* 4.74   HEMOGLOBIN 13.8* 14.3   HEMATOCRIT 42.0 43.4   MCV 90.3 91.6   MCH 29.7 30.2   MCHC 32.9* 32.9*   RDW 41.7 42.4   PLATELETCT 279 287   MPV 9.9 10.0     Recent Labs     01/22/23 2047 01/24/23  0041   SODIUM 131* 133*   POTASSIUM 4.4 4.2   CHLORIDE 94* 97   CO2 24 26   GLUCOSE 250* 215*   BUN 15 13   CREATININE 0.78 0.74   CALCIUM 9.0 8.7     Recent Labs     01/22/23 2047   INR 1.19*          Recent Labs     01/23/23  0613   TRIGLYCERIDE 145   HDL 25*   LDL 89       Imaging  CT-DRAIN-PERITONEAL   Final Result      1.  CT GUIDED LIVER ABSCESS DRAINAGE WITH PLACEMENT OF A 10 [place] LOCKING LOOP PIGTAIL CATHETER. EVACUATION 65 ML BEIGE BLOOD-TINGED PUS.   2.  THE CURRENT PLAN IS TO MONITOR DRAINAGE OUTPUT AND OBTAIN A FOLLOWUP CT SCAN IN 5-7 DAYS IF CLINICALLY INDICATED. ORDERS WERE WRITTEN FOR TWICE DAILY IRRIGATION OF THE PIGTAIL CATHETER WITH 5 ML STERILE SALINE.      OUTSIDE IMAGES-CT CHEST   Final Result      OUTSIDE IMAGES-CT ABDOMEN /PELVIS   Final Result           Assessment/Plan  * Hepatic abscess- (present on admission)  Assessment & Plan  Cait-hepatic in nature  Unclear cause, blood cultures are ngtd,      Consider E histolytica ag assay, though pre-test probability is low    S/p drain placement by IR 1/23, culture pending  Continue Zosyn  May Repeat CT scan in 3-5 days. Follow drain output  Consult ID once cultures finalized    Elevated liver enzymes  Assessment & Plan  Likely due to hepatic abscess   continue to monitor      Abnormal CT of the chest- (present on admission)  Assessment & Plan  Outpatient follow up    Positive D dimer- (present on admission)  Assessment & Plan  Unclear etiology    Hyponatremia- (present on admission)  Assessment & Plan  Mild  Continue to monitor    New onset type 2 diabetes mellitus (HCC)- (present on admission)  Assessment & Plan  BG high at admission likely   A1c 11.6    Started on SSI  BG still high, added Lantus 10 units  Continue to monitor    Sepsis (HCC)- (present on admission)  Assessment & Plan  Resolving  2/2 cait-hepatic abscess  See elsewhere         VTE prophylaxis: SCDs/TEDs and enoxaparin ppx    I have performed a physical exam and reviewed and updated ROS and Plan today (1/24/2023). In review of yesterday's note (1/23/2023), there are no changes except as documented above.

## 2023-01-24 NOTE — DIETARY
Nutrition Services: T2DM Education Consult   Day 2 of admit.  Tejas Yusuf is a 57 y.o. male with admitting DX of Hepatic abscess [K75.0]    RD able to visit pt at bedside to provide T2DM education. At time of visit pt reported severe pain. Pt expressed really wanting to speak with dietitian and being educated however was in too much pain to focus. [facility] T2DM booklet left at bedside for pt to review when able. RD came back to pt room later in day, pt still in pain. RD stated they would try education again tomorrow. Completing consult at this time.     No other education needs identified at this time. Consider referral to outpatient nutrition services for continuation of education as indicated or per pt preferences.     Please re-consult RD as indicated.     Of note, pt on regular diet. A1C was 11.6 (high) on 1/22/23. Changing diet to consistent carb.

## 2023-01-24 NOTE — ASSESSMENT & PLAN NOTE
BG high at admission likely   A1c 11.6    Started on SSI  BG still high, added Lantus 10 units  increase lantus to 15 units    1/28 BG still high 180-250, increase lantus to 20 units,   Continue SSI,   Hypoglycemia protocol    Continue to monitor

## 2023-01-24 NOTE — PROGRESS NOTES
[facility] Medicine Daily Progress Note    Date of Service  1/23/2023    Chief Complaint  Tejas Yusuf is a 57 y.o. male admitted 1/22/2023 with no significant past medical history found to have a cait-hepatic abscess and transferred to our facility for higher level of care.     Hospital Course  No notes on file    Interval Problem Update  Abscess-pain started 1 week ago, poor appetite, progressively worse. No prior issues. No foreign travel or odd food ingestions. He is thirsty.     I have discussed this patient's plan of care and discharge plan at IDT rounds today with Case Management, Nursing, Nursing leadership, and other members of the IDT team.    Consultants/Specialty  IR    Code Status  Full Code    Disposition  Patient is not medically cleared for discharge.   Anticipate discharge to to home with close outpatient follow-up.  I have placed the appropriate orders for post-discharge needs.    Review of Systems  Review of Systems   Constitutional:  Negative for chills and fever.   Gastrointestinal:  Positive for abdominal pain and nausea. Negative for vomiting.        Poor appetite   All other systems reviewed and are negative.     Physical Exam  Temp:  [36.6 °C (97.8 °F)-37.7 °C (99.9 °F)] 37.7 °C (99.9 °F)  Pulse:  [85-97] 86  Resp:  [18-25] 24  BP: (119-140)/(70-81) 119/73  SpO2:  [92 %-95 %] 94 %    Physical Exam  Vitals and nursing note reviewed.   Constitutional:       General: He is not in acute distress.     Appearance: He is well-developed.      Comments: Comfortable appearing     HENT:      Head: Normocephalic and atraumatic.      Mouth/Throat:      Pharynx: No oropharyngeal exudate.   Eyes:      General: No scleral icterus.     Pupils: Pupils are equal, round, and reactive to light.   Neck:      Thyroid: No thyromegaly.   Cardiovascular:      Rate and Rhythm: Normal rate and regular rhythm.      Heart sounds: Normal heart sounds. No murmur heard.  Pulmonary:      Effort: Pulmonary effort is normal. No  respiratory distress.      Breath sounds: Normal breath sounds. No wheezing.   Abdominal:      General: Bowel sounds are normal. There is no distension.      Palpations: Abdomen is soft.      Tenderness: There is abdominal tenderness (mild RUQ).   Musculoskeletal:         General: No tenderness. Normal range of motion.      Cervical back: Normal range of motion and neck supple.   Skin:     General: Skin is warm and dry.      Findings: No rash.   Neurological:      Mental Status: He is alert and oriented to person, place, and time.      Cranial Nerves: No cranial nerve deficit.       Fluids  No intake or output data in the 24 hours ending 01/23/23 1715    Laboratory  Recent Labs     01/22/23 2047   WBC 11.5*   RBC 4.65*   HEMOGLOBIN 13.8*   HEMATOCRIT 42.0   MCV 90.3   MCH 29.7   MCHC 32.9*   RDW 41.7   PLATELETCT 279   MPV 9.9     Recent Labs     01/22/23 2047   SODIUM 131*   POTASSIUM 4.4   CHLORIDE 94*   CO2 24   GLUCOSE 250*   BUN 15   CREATININE 0.78   CALCIUM 9.0     Recent Labs     01/22/23 2047   INR 1.19*         Recent Labs     01/23/23  0613   TRIGLYCERIDE 145   HDL 25*   LDL 89       Imaging  OUTSIDE IMAGES-CT CHEST   Final Result      OUTSIDE IMAGES-CT ABDOMEN /PELVIS   Final Result      CT-DRAIN-PERITONEAL    (Results Pending)        Assessment/Plan  * Hepatic abscess- (present on admission)  Assessment & Plan  Nargis-hepatic in nature  Unclear cause, blood cultures are ngtd, I personally reviewed the cultures on 1/23  WBC remains elevated at 11.4, I personally reviewed the CBC on 1/23  Continue empiric IV abx;s for now  Drain placement today by IR, likely will need ID consult  Repeat CT scan in 3-5 days. Follow drain output  Consider E histolytica ag assay, though pre-test probability is low    Abnormal CT of the chest- (present on admission)  Assessment & Plan  Outpatient follow up    Positive D dimer- (present on admission)  Assessment & Plan  Unclear etiology    Hyponatremia- (present on  admission)  Assessment & Plan  Stable, I personally reviewed his BMP on 1/23  I ordered a repeat BMP for 1/24    New onset type 2 diabetes mellitus (HCC)- (present on admission)  Assessment & Plan  Blood sugars remain ok at this time, I personally reviewed his blood sugars on 1/23  ISS, adjust PRN    Sepsis (HCC)- (present on admission)  Assessment & Plan  Resolving  2/2 cait-hepatic abscess  See elsewhere         VTE prophylaxis: SCDs/TEDs    I have performed a physical exam and reviewed and updated ROS and Plan today (1/23/2023). In review of yesterday's note (1/22/2023), there are no changes except as documented above.

## 2023-01-24 NOTE — PROGRESS NOTES
Pt presents to CT 4. Pt was consented by MD at bedside, confirmed by this RN and consent at bedside. Pt transferred to Ct 4 table in supine position. Patient underwent a liver abscess drain placement by Dr. Clemons. Procedure site was marked by MD and verified using imaging guidance. Pt placed on monitor, prepped and draped in a sterile fashion. Vitals were taken every 5 minutes and remained stable during procedure (see doc flow sheet for results). CO2 waveform capnography was monitored and remained WNL throughout procedure. Report called to Titi SANCHEZ. Pt transported by eulalia with RN to S625. Fluid Labs X  65 mL hand delivered to lab.    10 f x 25 cm pigtail drain    REF: [id number]  LOT: [id number]  Exp: 10/18/2025

## 2023-01-24 NOTE — DOCUMENTATION QUERY
[facility]                                                                       Query Response Note      PATIENT:               ADRY LOPEZ  ACCT #:                  [id number]  MRN:                     [id number]  :                      1965  ADMIT DATE:       2023 7:36 PM  DISCH DATE:          RESPONDING  PROVIDER #:        [id number]           QUERY TEXT:    57 year old male, transfer from outside facility where he was treated for sepsis. Received IV zosyn, fluids prior to transfer at outside facility WBC 14, fever 101.     Can you clarify if sepsis is an active diagnosis in our facility and is being treated or if it had resolved prior to arrival.    Sepsis - real or suspected infection plus 2 or more SIRS criteria  Temp <96.8 or >101  HR >90  RR >20  WBC <4,000 or > 12,000  >10% bandemia         The patient's Clinical Indicators include:  23 H&P: At the outside ED, he was found to be septic with fever up to 101, WBC 14, and glucose 373.  Admit v/s:  99.8-97-/81  Per Epic: WBC 11.2 here, has been afebrile, one heart rate 97, rest in the 80s, no low blood pressures, glucose has remained elevated 250, 215    Treatment: 23 H&P: IV zosyn, 2 L NS Bolus prior to transfer here   OR Surgeon: Drainage right lobe liver    Risk Factors: pyogenic liver abscess     MarcelloyoViviana osullivan RN   Clinical Documentation   [email or web address]  Connect via Vo[employer] Messenger  Options provided:   -- Sepsis exists, (please document additional + SIRS criteria and clinical indicators)   -- Sepsis resolved prior to arrival, does not exist - amended documentation in the medical record and updated problem list   -- Other explanation, (please specify other explanation)   -- Unable to provide additional clarity regarding the diagnosis      Query created by: Viviana Lara on 2023 8:05 AM    RESPONSE TEXT:    Sepsis resolved  prior to arrival, does not exist - amended documentation in the medical record and updated problem list          Electronically signed by:  URI RIVERA MD 1/24/2023 2:37 PM

## 2023-01-24 NOTE — ASSESSMENT & PLAN NOTE
Nargis-hepatic in nature  Unclear cause, blood cultures are ngtd,      Consider E histolytica ag assay, though pre-test probability is low    S/p drain placement by IR 1/23  S/p drain placement by IR 1/23, culture positive for Streptococcus intermedius, b/c positive for strep    repeated abdominal CT  1/29 showed a decrease size of hepatic abscess  TTE unremarkable with EF of 55%, negative for vegetation    I discussed with ID, recommended at least 4 weeks of Augmentin until 2/25/2023  Repeat a CBC, CMP and CT scan in 4 weeks (ordered by ID)

## 2023-01-24 NOTE — CARE PLAN
The patient is Stable - Low risk of patient condition declining or worsening    Shift Goals  Clinical Goals: pain control post op, IV antibiotic      Progress made toward(s) clinical / shift goals:  pt. Able to sleep comfortably after pain med, Q8 IV abx.    Problem: Knowledge Deficit - Standard  Goal: Patient and family/care givers will demonstrate understanding of plan of care, disease process/condition, diagnostic tests and medications  Outcome: Progressing     Problem: Pain - Standard  Goal: Alleviation of pain or a reduction in pain to the patient’s comfort goal  Outcome: Progressing

## 2023-01-24 NOTE — ASSESSMENT & PLAN NOTE
AST ALT 68/61-> 86/103, was normal at admission  RUQ U/S Hepatomegaly and hepatic steatosis; Posterior right hepatic collection/abscess measures about 6.4 x 5.8 x 6.2 cm.    Likely due to hepatic abscess   continue to monitor    Now Trending down

## 2023-01-25 PROBLEM — E83.39 HYPOPHOSPHATASIA: Status: ACTIVE | Noted: 2023-01-25

## 2023-01-25 LAB
ALBUMIN SERPL BCP-MCNC: 3 G/DL (ref 3.2–4.9)
ALBUMIN/GLOB SERPL: 0.7 G/DL
ALP SERPL-CCNC: 115 U/L (ref 30–99)
ALT SERPL-CCNC: 102 U/L (ref 2–50)
ANION GAP SERPL CALC-SCNC: 13 MMOL/L (ref 7–16)
AST SERPL-CCNC: 48 U/L (ref 12–45)
BILIRUB SERPL-MCNC: 0.4 MG/DL (ref 0.1–1.5)
BUN SERPL-MCNC: 13 MG/DL (ref 8–22)
CALCIUM ALBUM COR SERPL-MCNC: 9.5 MG/DL (ref 8.5–10.5)
CALCIUM SERPL-MCNC: 8.7 MG/DL (ref 8.5–10.5)
CHLORIDE SERPL-SCNC: 98 MMOL/L (ref 96–112)
CO2 SERPL-SCNC: 20 MMOL/L (ref 20–33)
CREAT SERPL-MCNC: 0.67 MG/DL (ref 0.5–1.4)
ERYTHROCYTE [DISTWIDTH] IN BLOOD BY AUTOMATED COUNT: 41.2 FL (ref 35.9–50)
GFR SERPLBLD CREATININE-BSD FMLA CKD-EPI: 107 ML/MIN/1.73 M 2
GLOBULIN SER CALC-MCNC: 4.1 G/DL (ref 1.9–3.5)
GLUCOSE BLD STRIP.AUTO-MCNC: 248 MG/DL (ref 65–99)
GLUCOSE BLD STRIP.AUTO-MCNC: 254 MG/DL (ref 65–99)
GLUCOSE BLD STRIP.AUTO-MCNC: 267 MG/DL (ref 65–99)
GLUCOSE BLD STRIP.AUTO-MCNC: 301 MG/DL (ref 65–99)
GLUCOSE BLD STRIP.AUTO-MCNC: 305 MG/DL (ref 65–99)
GLUCOSE SERPL-MCNC: 248 MG/DL (ref 65–99)
HCT VFR BLD AUTO: 41.8 % (ref 42–52)
HGB BLD-MCNC: 13.9 G/DL (ref 14–18)
MCH RBC QN AUTO: 30.2 PG (ref 27–33)
MCHC RBC AUTO-ENTMCNC: 33.3 G/DL (ref 33.7–35.3)
MCV RBC AUTO: 90.7 FL (ref 81.4–97.8)
PHOSPHATE SERPL-MCNC: 2.4 MG/DL (ref 2.5–4.5)
PLATELET # BLD AUTO: 436 K/UL (ref 164–446)
PMV BLD AUTO: 10.1 FL (ref 9–12.9)
POTASSIUM SERPL-SCNC: 4.4 MMOL/L (ref 3.6–5.5)
PROT SERPL-MCNC: 7.1 G/DL (ref 6–8.2)
RBC # BLD AUTO: 4.61 M/UL (ref 4.7–6.1)
SODIUM SERPL-SCNC: 131 MMOL/L (ref 135–145)
WBC # BLD AUTO: 11.6 K/UL (ref 4.8–10.8)

## 2023-01-25 PROCEDURE — A9270 NON-COVERED ITEM OR SERVICE: HCPCS | Performed by: STUDENT IN AN ORGANIZED HEALTH CARE EDUCATION/TRAINING PROGRAM

## 2023-01-25 PROCEDURE — 700102 HCHG RX REV CODE 250 W/ 637 OVERRIDE(OP): Performed by: STUDENT IN AN ORGANIZED HEALTH CARE EDUCATION/TRAINING PROGRAM

## 2023-01-25 PROCEDURE — 85027 COMPLETE CBC AUTOMATED: CPT

## 2023-01-25 PROCEDURE — 700111 HCHG RX REV CODE 636 W/ 250 OVERRIDE (IP): Performed by: STUDENT IN AN ORGANIZED HEALTH CARE EDUCATION/TRAINING PROGRAM

## 2023-01-25 PROCEDURE — 36415 COLL VENOUS BLD VENIPUNCTURE: CPT

## 2023-01-25 PROCEDURE — 700105 HCHG RX REV CODE 258: Performed by: STUDENT IN AN ORGANIZED HEALTH CARE EDUCATION/TRAINING PROGRAM

## 2023-01-25 PROCEDURE — 700105 HCHG RX REV CODE 258: Performed by: INTERNAL MEDICINE

## 2023-01-25 PROCEDURE — 700111 HCHG RX REV CODE 636 W/ 250 OVERRIDE (IP): Performed by: INTERNAL MEDICINE

## 2023-01-25 PROCEDURE — 84100 ASSAY OF PHOSPHORUS: CPT

## 2023-01-25 PROCEDURE — 80053 COMPREHEN METABOLIC PANEL: CPT

## 2023-01-25 PROCEDURE — 99232 SBSQ HOSP IP/OBS MODERATE 35: CPT | Performed by: STUDENT IN AN ORGANIZED HEALTH CARE EDUCATION/TRAINING PROGRAM

## 2023-01-25 PROCEDURE — 770006 HCHG ROOM/CARE - MED/SURG/GYN SEMI*

## 2023-01-25 PROCEDURE — 82962 GLUCOSE BLOOD TEST: CPT | Mod: 91

## 2023-01-25 RX ADMIN — SENNOSIDES AND DOCUSATE SODIUM 2 TABLET: 50; 8.6 TABLET ORAL at 04:53

## 2023-01-25 RX ADMIN — PIPERACILLIN AND TAZOBACTAM 4.5 G: 4; .5 INJECTION, POWDER, LYOPHILIZED, FOR SOLUTION INTRAVENOUS; PARENTERAL at 12:43

## 2023-01-25 RX ADMIN — HYDROMORPHONE HYDROCHLORIDE 1 MG: 1 INJECTION, SOLUTION INTRAMUSCULAR; INTRAVENOUS; SUBCUTANEOUS at 04:51

## 2023-01-25 RX ADMIN — DIBASIC SODIUM PHOSPHATE, MONOBASIC POTASSIUM PHOSPHATE AND MONOBASIC SODIUM PHOSPHATE 500 MG: 852; 155; 130 TABLET ORAL at 08:44

## 2023-01-25 RX ADMIN — DIBASIC SODIUM PHOSPHATE, MONOBASIC POTASSIUM PHOSPHATE AND MONOBASIC SODIUM PHOSPHATE 500 MG: 852; 155; 130 TABLET ORAL at 17:38

## 2023-01-25 RX ADMIN — DIBASIC SODIUM PHOSPHATE, MONOBASIC POTASSIUM PHOSPHATE AND MONOBASIC SODIUM PHOSPHATE 500 MG: 852; 155; 130 TABLET ORAL at 12:42

## 2023-01-25 RX ADMIN — INSULIN HUMAN 2 UNITS: 100 INJECTION, SOLUTION PARENTERAL at 17:32

## 2023-01-25 RX ADMIN — INSULIN HUMAN 3 UNITS: 100 INJECTION, SOLUTION PARENTERAL at 07:00

## 2023-01-25 RX ADMIN — ENOXAPARIN SODIUM 40 MG: 40 INJECTION SUBCUTANEOUS at 17:37

## 2023-01-25 RX ADMIN — HYDROMORPHONE HYDROCHLORIDE 1 MG: 1 INJECTION, SOLUTION INTRAMUSCULAR; INTRAVENOUS; SUBCUTANEOUS at 20:30

## 2023-01-25 RX ADMIN — INSULIN HUMAN 4 UNITS: 100 INJECTION, SOLUTION PARENTERAL at 20:44

## 2023-01-25 RX ADMIN — PIPERACILLIN AND TAZOBACTAM 4.5 G: 4; .5 INJECTION, POWDER, LYOPHILIZED, FOR SOLUTION INTRAVENOUS; PARENTERAL at 04:58

## 2023-01-25 RX ADMIN — SENNOSIDES AND DOCUSATE SODIUM 2 TABLET: 50; 8.6 TABLET ORAL at 17:38

## 2023-01-25 RX ADMIN — INSULIN HUMAN 3 UNITS: 100 INJECTION, SOLUTION PARENTERAL at 12:34

## 2023-01-25 RX ADMIN — AMPICILLIN AND SULBACTAM 3 G: 1; 2 INJECTION, POWDER, FOR SOLUTION INTRAMUSCULAR; INTRAVENOUS at 20:36

## 2023-01-25 ASSESSMENT — PAIN SCALES - PAIN ASSESSMENT IN ADVANCED DEMENTIA (PAINAD)
BREATHING: NORMAL
CONSOLABILITY: NO NEED TO CONSOLE
BODYLANGUAGE: RELAXED
FACIALEXPRESSION: SMILING OR INEXPRESSIVE

## 2023-01-25 ASSESSMENT — ENCOUNTER SYMPTOMS
EYE PAIN: 0
DEPRESSION: 0
HEADACHES: 0
SHORTNESS OF BREATH: 0
VOMITING: 0
WHEEZING: 0
ABDOMINAL PAIN: 1
ROS GI COMMENTS: POOR APPETITE
FEVER: 0
CHILLS: 0

## 2023-01-25 ASSESSMENT — PAIN DESCRIPTION - PAIN TYPE: TYPE: ACUTE PAIN

## 2023-01-25 ASSESSMENT — PATIENT HEALTH QUESTIONNAIRE - PHQ9
1. LITTLE INTEREST OR PLEASURE IN DOING THINGS: NOT AT ALL
2. FEELING DOWN, DEPRESSED, IRRITABLE, OR HOPELESS: NOT AT ALL
SUM OF ALL RESPONSES TO PHQ9 QUESTIONS 1 AND 2: 0

## 2023-01-25 ASSESSMENT — PAIN SCALES - WONG BAKER: WONGBAKER_NUMERICALRESPONSE: HURTS JUST A LITTLE BIT

## 2023-01-25 NOTE — CARE PLAN
Problem: Knowledge Deficit - Standard  Goal: Patient and family/care givers will demonstrate understanding of plan of care, disease process/condition, diagnostic tests and medications  Outcome: Progressing     Problem: Pain - Standard  Goal: Alleviation of pain or a reduction in pain to the patient’s comfort goal  Outcome: Progressing   The patient is Stable - Low risk of patient condition declining or worsening    Shift Goals  Clinical Goals: pain control  Patient Goals: rest  Family Goals: na    Progress made toward(s) clinical / shift goals:  understands the disease process and is able to empty bulb and identify pain as related to incision site.    Patient is not progressing towards the following goals:n/a

## 2023-01-25 NOTE — CARE PLAN
The patient is Stable - Low risk of patient condition declining or worsening    Shift Goals  Clinical Goals: ambulation, pain control, IV access and Rest  Patient Goals: ambulation, pain control and rest  Family Goals: N/A    Progress made toward(s) clinical / shift goals:      Problem: Knowledge Deficit - Standard  Goal: Patient and family/care givers will demonstrate understanding of plan of care, disease process/condition, diagnostic tests and medications  Outcome: Progressing  Note: Patient is A&0x4 and is aware of tx plan. Continue to monitor.      Problem: Pain - Standard  Goal: Alleviation of pain or a reduction in pain to the patient’s comfort goal  1/24/2023 2331 by Joyce Tsai R.N.  Outcome: Progressing  1/24/2023 2326 by Joyce Tsai R.N.  Note: Pain controled with medication per MAR. Non pharmological Mesures ICE packs to the abdomen. Education and pillows given to help brace sneezing, cough, and laughter. Patient understands and is comfortable at this time. When ambulation encourage patient to get up slowly, and sit on edge of bed, use FWW.      Problem: Fall Risk  Goal: Patient will remain free from falls  1/24/2023 2331 by Joyce Tsai R.N.  Outcome: Progressing  1/24/2023 2326 by Joyce Tsai R.N.  Note: Patient will use call light before getting up, education was given to sit on edge of bed prior to standing and use the FWW. Patient understands and will do so.        Patient is not progressing towards the following goals:  Patient states he hasn't had a bowel move meant since surgery. Will start bowel protocol.

## 2023-01-25 NOTE — DIETARY
Nutrition Services: T2DM Education Consult   Day 3 of admit.  Tejas Yusuf is a 57 y.o. male with admitting DX of Hepatic abscess [K75.0]    RD able to visit pt at bedside to provide T2DM education after multiple attempts 1/24. RD discussed carb counting and protein/CHO sources, handouts were left at previous visit. Discussed the importance of eating protein with carbohydrates to regulate blood sugar levels. Pt demonstrated  readiness and evidence of learning. RD able to answer all questions to patient's satisfaction.     No other education needs identified at this time. Consider referral to outpatient nutrition services for continuation of education as indicated or per pt preferences.     Please re-consult RD as indicated.

## 2023-01-25 NOTE — CONSULTS
ID consult for liver abscess  Blood cx+strep  Liver fluid +strep intermedius  Change Zosyn to Unasyn  Full consult tomorrow

## 2023-01-26 ENCOUNTER — APPOINTMENT (OUTPATIENT)
Dept: RADIOLOGY | Facility: MEDICAL CENTER | Age: 58
DRG: 442 | End: 2023-01-26
Attending: STUDENT IN AN ORGANIZED HEALTH CARE EDUCATION/TRAINING PROGRAM
Payer: COMMERCIAL

## 2023-01-26 LAB
ALBUMIN SERPL BCP-MCNC: 2.9 G/DL (ref 3.2–4.9)
ALBUMIN/GLOB SERPL: 0.8 G/DL
ALP SERPL-CCNC: 103 U/L (ref 30–99)
ALT SERPL-CCNC: 86 U/L (ref 2–50)
ANION GAP SERPL CALC-SCNC: 11 MMOL/L (ref 7–16)
AST SERPL-CCNC: 34 U/L (ref 12–45)
BACTERIA BLD CULT: ABNORMAL
BACTERIA BLD CULT: ABNORMAL
BACTERIA WND AEROBE CULT: ABNORMAL
BACTERIA WND AEROBE CULT: ABNORMAL
BILIRUB SERPL-MCNC: 0.3 MG/DL (ref 0.1–1.5)
BUN SERPL-MCNC: 12 MG/DL (ref 8–22)
CALCIUM ALBUM COR SERPL-MCNC: 9.8 MG/DL (ref 8.5–10.5)
CALCIUM SERPL-MCNC: 8.9 MG/DL (ref 8.5–10.5)
CHLORIDE SERPL-SCNC: 96 MMOL/L (ref 96–112)
CO2 SERPL-SCNC: 27 MMOL/L (ref 20–33)
CREAT SERPL-MCNC: 0.61 MG/DL (ref 0.5–1.4)
ERYTHROCYTE [DISTWIDTH] IN BLOOD BY AUTOMATED COUNT: 42 FL (ref 35.9–50)
GFR SERPLBLD CREATININE-BSD FMLA CKD-EPI: 110 ML/MIN/1.73 M 2
GLOBULIN SER CALC-MCNC: 3.7 G/DL (ref 1.9–3.5)
GLUCOSE BLD STRIP.AUTO-MCNC: 207 MG/DL (ref 65–99)
GLUCOSE BLD STRIP.AUTO-MCNC: 249 MG/DL (ref 65–99)
GLUCOSE BLD STRIP.AUTO-MCNC: 264 MG/DL (ref 65–99)
GLUCOSE BLD STRIP.AUTO-MCNC: 297 MG/DL (ref 65–99)
GLUCOSE SERPL-MCNC: 223 MG/DL (ref 65–99)
GRAM STN SPEC: ABNORMAL
HCT VFR BLD AUTO: 41.5 % (ref 42–52)
HGB BLD-MCNC: 13.7 G/DL (ref 14–18)
MAGNESIUM SERPL-MCNC: 2.1 MG/DL (ref 1.5–2.5)
MCH RBC QN AUTO: 30 PG (ref 27–33)
MCHC RBC AUTO-ENTMCNC: 33 G/DL (ref 33.7–35.3)
MCV RBC AUTO: 91 FL (ref 81.4–97.8)
PHOSPHATE SERPL-MCNC: 2.3 MG/DL (ref 2.5–4.5)
PLATELET # BLD AUTO: 410 K/UL (ref 164–446)
PMV BLD AUTO: 9.3 FL (ref 9–12.9)
POTASSIUM SERPL-SCNC: 4.6 MMOL/L (ref 3.6–5.5)
PROT SERPL-MCNC: 6.6 G/DL (ref 6–8.2)
RBC # BLD AUTO: 4.56 M/UL (ref 4.7–6.1)
SIGNIFICANT IND 70042: ABNORMAL
SIGNIFICANT IND 70042: ABNORMAL
SITE SITE: ABNORMAL
SITE SITE: ABNORMAL
SODIUM SERPL-SCNC: 134 MMOL/L (ref 135–145)
SOURCE SOURCE: ABNORMAL
SOURCE SOURCE: ABNORMAL
WBC # BLD AUTO: 10 K/UL (ref 4.8–10.8)

## 2023-01-26 PROCEDURE — 85027 COMPLETE CBC AUTOMATED: CPT

## 2023-01-26 PROCEDURE — A9270 NON-COVERED ITEM OR SERVICE: HCPCS | Performed by: STUDENT IN AN ORGANIZED HEALTH CARE EDUCATION/TRAINING PROGRAM

## 2023-01-26 PROCEDURE — 700111 HCHG RX REV CODE 636 W/ 250 OVERRIDE (IP): Performed by: STUDENT IN AN ORGANIZED HEALTH CARE EDUCATION/TRAINING PROGRAM

## 2023-01-26 PROCEDURE — 36415 COLL VENOUS BLD VENIPUNCTURE: CPT

## 2023-01-26 PROCEDURE — 700111 HCHG RX REV CODE 636 W/ 250 OVERRIDE (IP): Performed by: INTERNAL MEDICINE

## 2023-01-26 PROCEDURE — 700105 HCHG RX REV CODE 258: Performed by: INTERNAL MEDICINE

## 2023-01-26 PROCEDURE — 83735 ASSAY OF MAGNESIUM: CPT

## 2023-01-26 PROCEDURE — 99232 SBSQ HOSP IP/OBS MODERATE 35: CPT | Performed by: STUDENT IN AN ORGANIZED HEALTH CARE EDUCATION/TRAINING PROGRAM

## 2023-01-26 PROCEDURE — 700102 HCHG RX REV CODE 250 W/ 637 OVERRIDE(OP): Performed by: STUDENT IN AN ORGANIZED HEALTH CARE EDUCATION/TRAINING PROGRAM

## 2023-01-26 PROCEDURE — 99223 1ST HOSP IP/OBS HIGH 75: CPT | Performed by: INTERNAL MEDICINE

## 2023-01-26 PROCEDURE — 82962 GLUCOSE BLOOD TEST: CPT | Mod: 91

## 2023-01-26 PROCEDURE — 80053 COMPREHEN METABOLIC PANEL: CPT

## 2023-01-26 PROCEDURE — 84100 ASSAY OF PHOSPHORUS: CPT

## 2023-01-26 PROCEDURE — 76705 ECHO EXAM OF ABDOMEN: CPT

## 2023-01-26 PROCEDURE — 770006 HCHG ROOM/CARE - MED/SURG/GYN SEMI*

## 2023-01-26 RX ADMIN — DIBASIC SODIUM PHOSPHATE, MONOBASIC POTASSIUM PHOSPHATE AND MONOBASIC SODIUM PHOSPHATE 500 MG: 852; 155; 130 TABLET ORAL at 17:47

## 2023-01-26 RX ADMIN — AMPICILLIN AND SULBACTAM 3 G: 1; 2 INJECTION, POWDER, FOR SOLUTION INTRAMUSCULAR; INTRAVENOUS at 05:55

## 2023-01-26 RX ADMIN — INSULIN HUMAN 3 UNITS: 100 INJECTION, SOLUTION PARENTERAL at 17:41

## 2023-01-26 RX ADMIN — ENOXAPARIN SODIUM 40 MG: 40 INJECTION SUBCUTANEOUS at 17:45

## 2023-01-26 RX ADMIN — AMPICILLIN AND SULBACTAM 3 G: 1; 2 INJECTION, POWDER, FOR SOLUTION INTRAMUSCULAR; INTRAVENOUS at 17:59

## 2023-01-26 RX ADMIN — SENNOSIDES AND DOCUSATE SODIUM 2 TABLET: 50; 8.6 TABLET ORAL at 18:00

## 2023-01-26 RX ADMIN — AMPICILLIN AND SULBACTAM 3 G: 1; 2 INJECTION, POWDER, FOR SOLUTION INTRAMUSCULAR; INTRAVENOUS at 12:19

## 2023-01-26 RX ADMIN — DIBASIC SODIUM PHOSPHATE, MONOBASIC POTASSIUM PHOSPHATE AND MONOBASIC SODIUM PHOSPHATE 500 MG: 852; 155; 130 TABLET ORAL at 12:19

## 2023-01-26 RX ADMIN — SENNOSIDES AND DOCUSATE SODIUM 2 TABLET: 50; 8.6 TABLET ORAL at 06:00

## 2023-01-26 RX ADMIN — INSULIN HUMAN 2 UNITS: 100 INJECTION, SOLUTION PARENTERAL at 08:21

## 2023-01-26 RX ADMIN — INSULIN HUMAN 2 UNITS: 100 INJECTION, SOLUTION PARENTERAL at 12:10

## 2023-01-26 RX ADMIN — DIBASIC SODIUM PHOSPHATE, MONOBASIC POTASSIUM PHOSPHATE AND MONOBASIC SODIUM PHOSPHATE 500 MG: 852; 155; 130 TABLET ORAL at 08:27

## 2023-01-26 RX ADMIN — INSULIN HUMAN 3 UNITS: 100 INJECTION, SOLUTION PARENTERAL at 20:54

## 2023-01-26 RX ADMIN — HYDROCODONE BITARTRATE AND ACETAMINOPHEN 1 TABLET: 5; 325 TABLET ORAL at 18:05

## 2023-01-26 ASSESSMENT — ENCOUNTER SYMPTOMS
ROS GI COMMENTS: POOR APPETITE
HEADACHES: 0
SHORTNESS OF BREATH: 0
CHILLS: 0
ABDOMINAL PAIN: 1
WHEEZING: 0
VOMITING: 0
DEPRESSION: 0
EYE PAIN: 0
FEVER: 0

## 2023-01-26 ASSESSMENT — PATIENT HEALTH QUESTIONNAIRE - PHQ9
1. LITTLE INTEREST OR PLEASURE IN DOING THINGS: NOT AT ALL
SUM OF ALL RESPONSES TO PHQ9 QUESTIONS 1 AND 2: 0
2. FEELING DOWN, DEPRESSED, IRRITABLE, OR HOPELESS: NOT AT ALL

## 2023-01-26 ASSESSMENT — PAIN DESCRIPTION - PAIN TYPE
TYPE: ACUTE PAIN
TYPE: ACUTE PAIN

## 2023-01-26 NOTE — PROGRESS NOTES
[facility] Medicine Daily Progress Note    Date of Service  1/26/2023    Chief Complaint  Tejas Yusuf is a 57 y.o. male 1/22/2023  transferred to our facility for higher level of care for hepatic abscess.     Hospital Course  Tejas Yusuf is a 57 y.o. male admitted 1/22/2023 with no significant past medical history found to have a cait-hepatic abscess and transferred to our facility for higher level of care. Abscess-pain started 1 week ago, poor appetite, progressively worse. No prior issues. No foreign travel or odd food ingestions.     Interval Problem Update  Reported abdominal pain, tolerable  Denies nausea vomiting.  Had a bowel movement today    S/p drain placement by IR 1/23, culture positive for Streptococcus intermedius,   b/c positive for Viridans Streptococcus, possible contamination  I discussed with ID, deescalate zosyn to unasyn    Sodium 131 -134  AST ALT 68/61-> 86/103, was normal at admission  WBC 11.2  A1c 11.6  increase lantus to 15 units, BG better 200-250  Phos 2.3    RUQ U/S Hepatomegaly and hepatic steatosis; Posterior right hepatic collection/abscess measures about 6.4 x 5.8 x 6.2 cm.    I have discussed this patient's plan of care and discharge plan at IDT rounds today with Case Management, Nursing, Nursing leadership, and other members of the IDT team.    Consultants/Specialty  IR, ID    Code Status  Full Code    Disposition  Patient is not medically cleared for discharge.   Anticipate discharge to to home with close outpatient follow-up.  I have placed the appropriate orders for post-discharge needs.    Review of Systems  Review of Systems   Constitutional:  Positive for malaise/fatigue. Negative for chills and fever.   HENT:  Negative for ear pain.    Eyes:  Negative for pain.   Respiratory:  Negative for shortness of breath and wheezing.    Cardiovascular:  Negative for chest pain and leg swelling.   Gastrointestinal:  Positive for abdominal pain. Negative for vomiting.        Poor  appetite   Genitourinary:  Negative for dysuria, frequency and urgency.   Neurological:  Negative for headaches.   Psychiatric/Behavioral:  Negative for depression.    All other systems reviewed and are negative.     Physical Exam  Temp:  [36.1 °C (96.9 °F)-36.4 °C (97.5 °F)] 36.1 °C (97 °F)  Pulse:  [74-86] 74  Resp:  [17-18] 17  BP: (101-139)/(70-84) 139/84  SpO2:  [94 %] 94 %    Physical Exam  Vitals and nursing note reviewed.   Constitutional:       General: He is not in acute distress.     Appearance: He is well-developed.      Comments: Comfortable appearing     HENT:      Head: Normocephalic and atraumatic.      Mouth/Throat:      Pharynx: No oropharyngeal exudate.   Eyes:      General: No scleral icterus.     Pupils: Pupils are equal, round, and reactive to light.   Neck:      Thyroid: No thyromegaly.   Cardiovascular:      Rate and Rhythm: Normal rate and regular rhythm.      Heart sounds: Normal heart sounds. No murmur heard.  Pulmonary:      Effort: Pulmonary effort is normal. No respiratory distress.      Breath sounds: Normal breath sounds. No wheezing.   Abdominal:      General: Bowel sounds are normal. There is no distension.      Palpations: Abdomen is soft.      Tenderness: There is abdominal tenderness (mild RUQ).   Musculoskeletal:         General: No tenderness. Normal range of motion.      Cervical back: Normal range of motion and neck supple.   Skin:     General: Skin is warm and dry.      Findings: No rash.   Neurological:      Mental Status: He is alert and oriented to person, place, and time.      Cranial Nerves: No cranial nerve deficit.       Fluids    Intake/Output Summary (Last 24 hours) at 1/26/2023 1549  Last data filed at 1/26/2023 0700  Gross per 24 hour   Intake 360 ml   Output 635 ml   Net -275 ml       Laboratory  Recent Labs     01/24/23  0041 01/25/23  0514 01/26/23  0711   WBC 11.2* 11.6* 10.0   RBC 4.74 4.61* 4.56*   HEMOGLOBIN 14.3 13.9* 13.7*   HEMATOCRIT 43.4 41.8* 41.5*    MCV 91.6 90.7 91.0   MCH 30.2 30.2 30.0   MCHC 32.9* 33.3* 33.0*   RDW 42.4 41.2 42.0   PLATELETCT 287 436 410   MPV 10.0 10.1 9.3     Recent Labs     01/24/23  0041 01/25/23  0514 01/26/23  0711   SODIUM 133* 131* 134*   POTASSIUM 4.2 4.4 4.6   CHLORIDE 97 98 96   CO2 26 20 27   GLUCOSE 215* 248* 223*   BUN 13 13 12   CREATININE 0.74 0.67 0.61   CALCIUM 8.7 8.7 8.9                       Imaging  US-RUQ   Final Result      1.  Hepatomegaly and hepatic steatosis.   2.  Posterior right hepatic collection/abscess measures about 6.4 x 5.8 x 6.2 cm.      CT-DRAIN-PERITONEAL   Final Result      1.  CT GUIDED LIVER ABSCESS DRAINAGE WITH PLACEMENT OF A 10 English LOCKING LOOP PIGTAIL CATHETER. EVACUATION 65 ML BEIGE BLOOD-TINGED PUS.   2.  THE CURRENT PLAN IS TO MONITOR DRAINAGE OUTPUT AND OBTAIN A FOLLOWUP CT SCAN IN 5-7 DAYS IF CLINICALLY INDICATED. ORDERS WERE WRITTEN FOR TWICE DAILY IRRIGATION OF THE PIGTAIL CATHETER WITH 5 ML STERILE SALINE.      OUTSIDE IMAGES-CT CHEST   Final Result      OUTSIDE IMAGES-CT ABDOMEN /PELVIS   Final Result           Assessment/Plan  * Hepatic abscess- (present on admission)  Assessment & Plan  Nargis-hepatic in nature  Unclear cause, blood cultures are ngtd,      Consider E histolytica ag assay, though pre-test probability is low    S/p drain placement by IR 1/23  S/p drain placement by IR 1/23, culture positive for Streptococcus intermedius, b/c positive for strep  I discussed with ID, deescalate zosyn to unasyn    Consult ID once cultures finalized    New onset type 2 diabetes mellitus (HCC)- (present on admission)  Assessment & Plan  BG high at admission likely   A1c 11.6    Started on SSI  BG still high, added Lantus 10 units    increase lantus to 15 units, BG better 200-250  Continue SSI,   Hypoglycemia protocol    Continue to monitor    Hypophosphatasia  Assessment & Plan  Phos 2.3  replaced  Continue to monitor    Elevated liver enzymes  Assessment & Plan  AST ALT 68/61-> 86/103,  was normal at admission  RUQ U/S Hepatomegaly and hepatic steatosis; Posterior right hepatic collection/abscess measures about 6.4 x 5.8 x 6.2 cm.    Likely due to hepatic abscess   continue to monitor    trending up,       Abnormal CT of the chest- (present on admission)  Assessment & Plan  Outpatient follow up    Positive D dimer- (present on admission)  Assessment & Plan  Unclear etiology    Hyponatremia- (present on admission)  Assessment & Plan  Mild  Continue to monitor    Improving 134 today    Sepsis (HCC)- (present on admission)  Assessment & Plan  Resolving  2/2 cait-hepatic abscess  See elsewhere         VTE prophylaxis: SCDs/TEDs and enoxaparin ppx    I have performed a physical exam and reviewed and updated ROS and Plan today (1/26/2023). In review of yesterday's note (1/25/2023), there are no changes except as documented above.

## 2023-01-26 NOTE — PROGRESS NOTES
SANDER flushed with n/saline per order thick abcess drainage seen approx 15cc patient stated he feels much better .Patient remains on iv antibiotics and npo from misnight stable at this time

## 2023-01-26 NOTE — CONSULTS
"INFECTIOUS DISEASES INPATIENT CONSULT NOTE     Date of Service:1/26/2023    Consult Requested By: Eufemia Morse M.D.    Reason for Consultation: liver abscess +blood cxs    History of Present Illness:   Tejas Yusuf is a 57 y.o.  admitted as a transfer from [facility] on 1/22/2023 for abd pain and fever. Currently sleeping and did not awaken to voice  Per admitting note \"right-sided abdominal pain, fatigue, and subjective fever.  Of note, he has not seen a physician for \"many years.\"  Initial symptom was fatigue onset about 1 week ago.  3 days later developed sharp, nonradiating, right-sided flank/abdominal pain.  He then began experiencing subjective fevers and diminished appetite.  He denies any nausea, vomiting, weight loss, constipation, or diarrhea.  Last bowel movement was this morning.  Denies any melena or hematochezia.  He denies any dysuria or urethral discharge however has noticed increased frequency over the past 1 to 2 days.  He has been drinking \"gallons\" of fruit juice.  He denies any significant alcohol use; drinks 2 glasses of wine per week.  Denies any recent foreign travel or sick contacts.\"  Outside records reviewed  In ED Patient reported prior fever 101, Temp 98.2 in ED, tachycardic at 104 +leukocytosis WBC 14, and glucose 373.    CT abdomen pelvis w+ 6.5 X 6.1 cm hypodense liver lesion concerning for an abscess. He was started on Zosyn prior to transfer here.  CTA chest no PE mild pericardial thickening  Outside medical records include a different patient - (Cdiff)  Infectious Diseases consulted for antibiotic recommendation and management        Review Of Systems:  Limited by somnolence  Per review records-decreased pain since drainage  No fever  All other systems are reviewed and are negative     PMH:   diverticulitis      PSH:  No past surgical history on file.    FAMILY HX:  Unable to obtain due to somnolence    SOCIAL HX:  Social History     Socioeconomic History    Marital status: Single "     Spouse name: Not on file    Number of children: Not on file    Years of education: Not on file    Highest education level: Not on file   Occupational History    Not on file   Tobacco Use    Smoking status: Not on file    Smokeless tobacco: Not on file   Substance and Sexual Activity    Alcohol use: Not on file    Drug use: Not on file    Sexual activity: Not on file   Other Topics Concern    Not on file   Social History Narrative    Not on file     Social Determinants of Health     Financial Resource Strain: Not on file   Food Insecurity: Not on file   Transportation Needs: Not on file   Physical Activity: Not on file   Stress: Not on file   Social Connections: Not on file   Intimate Partner Violence: Not on file   Housing Stability: Not on file     Social History     Tobacco Use   Smoking Status Not on file   Smokeless Tobacco Not on file     Social History     Substance and Sexual Activity   Alcohol Use Not on file       Allergies/Intolerances:  No Known Allergies      Other Current Medications:    Current Facility-Administered Medications:     phosphorus (K-Phos-Neutral) per tablet 500 mg, 500 mg, Oral, TID, Eufemia Morse M.D., 500 mg at 01/26/23 0827    ampicillin/sulbactam (UNASYN) 3 g in  mL IVPB, 3 g, Intravenous, Q6HRS, Diandra Astudillo M.D., Stopped at 01/26/23 0625    insulin GLARGINE (Lantus,Semglee) injection, 15 Units, Subcutaneous, Q EVENING, Eufemia Morse M.D., 15 Units at 01/25/23 1730    HYDROcodone-acetaminophen (NORCO) 5-325 MG per tablet 1-2 Tablet, 1-2 Tablet, Oral, Q6HRS PRN, Eufemia Morse M.D.    HYDROmorphone (Dilaudid) injection 1 mg, 1 mg, Intravenous, Q8HRS PRN, Eufemia Morse M.D., 1 mg at 01/25/23 2030    enoxaparin (Lovenox) inj 40 mg, 40 mg, Subcutaneous, DAILY AT 1800, Eufemia Morse M.D., 40 mg at 01/25/23 1737    senna-docusate (PERICOLACE or SENOKOT S) 8.6-50 MG per tablet 2 Tablet, 2 Tablet, Oral, BID, 2 Tablet at 01/26/23 0600 **AND** polyethylene glycol/lytes (MIRALAX) PACKET 1 Packet, 1  "Packet, Oral, QDAY PRN **AND** magnesium hydroxide (MILK OF MAGNESIA) suspension 30 mL, 30 mL, Oral, QDAY PRN **AND** bisacodyl (DULCOLAX) suppository 10 mg, 10 mg, Rectal, QDAY PRN, Rajesh Quiñonez M.D.    acetaminophen (Tylenol) tablet 650 mg, 650 mg, Oral, Q6HRS PRN, Rajesh Quiñonez M.D., 650 mg at 23 0450    ondansetron (ZOFRAN) syringe/vial injection 4 mg, 4 mg, Intravenous, Q4HRS PRN, Rajesh Quiñonez M.D.    ondansetron (ZOFRAN ODT) dispertab 4 mg, 4 mg, Oral, Q4HRS PRN, Rajesh Quiñonez M.D.    promethazine (PHENERGAN) tablet 12.5-25 mg, 12.5-25 mg, Oral, Q4HRS PRN, Rajesh Quiñonez M.D.    promethazine (PHENERGAN) suppository 12.5-25 mg, 12.5-25 mg, Rectal, Q4HRS PRN, Rajesh Quiñonez M.D.    prochlorperazine (COMPAZINE) injection 5-10 mg, 5-10 mg, Intravenous, Q4HRS PRN, Rajesh Quiñonez M.D.    insulin regular (HumuLIN R,NovoLIN R) injection, 1-6 Units, Subcutaneous, 4X/DAY ACHS, 2 Units at 23 1210 **AND** POC blood glucose manual result, , , Q AC AND BEDTIME(S) **AND** NOTIFY MD and PharmD, , , Once **AND** Administer 20 grams of glucose (approximately 8 ounces of fruit juice) every 15 minutes PRN FSBG less than 70 mg/dL, , , PRN **AND** dextrose 10 % BOLUS 25 g, 25 g, Intravenous, Q15 MIN PRN, Rajesh Quiñonez M.D.      Most Recent Vital Signs:  /84   Pulse 74   Temp 36.1 °C (97 °F) (Temporal)   Resp 17   Ht 1.854 m (6' 1\")   Wt (!) 136 kg (300 lb 8 oz)   SpO2 94%   BMI 39.65 kg/m²   Temp  Av.7 °C (98 °F)  Min: 36.1 °C (96.9 °F)  Max: 37.7 °C (99.9 °F)    Physical Exam:  General: well-appearing, well nourished no acute distress  HEENT: NCAT, PERRLA, sclera anicteric, Pno eye drainage  Neck: supple, no JVD  Chest: CTAB, unlabored.  Cardiac: RRR,  no m/r/g   Abdomen: + drain non-distended  Extremities: No cyanosis, clubbing. no edema, 2  Skin: no visible rashes   Neuro: somnolent no reported deficits  Psych: unable to assess    Pertinent Lab Results:  Recent Labs     23  0041 " "01/25/23  0514 01/26/23  0711   WBC 11.2* 11.6* 10.0      Recent Labs     01/24/23  0041 01/25/23  0514 01/26/23  0711   HEMOGLOBIN 14.3 13.9* 13.7*   HEMATOCRIT 43.4 41.8* 41.5*   MCV 91.6 90.7 91.0   MCH 30.2 30.2 30.0   PLATELETCT 287 436 410         Recent Labs     01/24/23  0041 01/25/23  0514 01/26/23  0711   SODIUM 133* 131* 134*   POTASSIUM 4.2 4.4 4.6   CHLORIDE 97 98 96   CO2 26 20 27   CREATININE 0.74 0.67 0.61        Recent Labs     01/24/23  0041 01/25/23  0514 01/26/23  0711   ALBUMIN 2.9* 3.0* 2.9*        Pertinent Micro:  Results       Procedure Component Value Units Date/Time    CULTURE WOUND W/ GRAM STAIN [[id number]]  (Abnormal) Collected: 01/23/23 1730    Order Status: Completed Specimen: Wound Updated: 01/26/23 0835     Significant Indicator POS     Source WND     Site liver abscess     Culture Result -     Gram Stain Result Many WBCs.  Many Gram positive cocci.       Culture Result Streptococcus intermedius  Heavy growth      BLOOD CULTURE [[id number]]  (Abnormal) Collected: 01/22/23 2047    Order Status: Completed Specimen: Blood from Peripheral Updated: 01/25/23 0918     Significant Indicator POS     Source BLD     Site PERIPHERAL     Culture Result Growth detected by Bactec instrument. 01/25/2023  05:59  Gram Stain: Gram positive cocci: Possible Streptococcus sp.      Narrative:      CALL  [place]  61 tel. [phone],  CALLED  61 tel. [phone] 01/25/2023, 06:03, RB PERF. RESULTS CALLED  TO:[phone] RN  Per Hospital Policy: Only change Specimen Src: to \"Line\" if  specified by physician order.  Left AC    GRAM STAIN [[id number]] Collected: 01/23/23 1730    Order Status: Completed Specimen: Wound Updated: 01/23/23 2300     Significant Indicator .     Source WND     Site liver abscess     Gram Stain Result Many WBCs.  Many Gram positive cocci.      FLUID CULTURE W/GRAM STAIN [[id number]]     Order Status: No result Specimen: Other Body Fluid     BLOOD CULTURE [[id number]] Collected: 01/22/23 2047    " "Order Status: Completed Specimen: Blood from Peripheral Updated: 01/23/23 0746     Significant Indicator NEG     Source BLD     Site PERIPHERAL     Culture Result No Growth  Note: Blood cultures are incubated for 5 days and  are monitored continuously.Positive blood cultures  are called to the RN and reported as soon as  they are identified.      Narrative:      Per Hospital Policy: Only change Specimen Src: to \"Line\" if  specified by physician order.  Right Hand    BLOOD CULTURE [[id number]]     Order Status: Canceled Specimen: Blood from Peripheral     BLOOD CULTURE [[id number]]     Order Status: Canceled Specimen: Blood from Peripheral           No results found for: BLOODCULTU, BLDCULT, BCHOLD     Studies:  CT as above  IMPRESSION:   Strep sepsis  Liver abscess  Diverticulitis  Leukocytosis      PLAN:   S/p drain liver abscess 1/23  -Culture strep intermedius (strep anginosis)  BCxs + strep, likely same as above  Repeat Bcxs every 48 hours until neg  Midline when Bcxs neg 48 hours  TTE; if unrevealing recommend RUBA if persistently positive blood cultures  Switched to Unasyn  Will need repeat CT 5-7 days from drain placement  Please remove other patient information from outside medical records scanned on 1/23  Final antibiotic recommendations per culture results and clinical course          Plan of care discussed with CHRIS Morse M.D.. Will continue to follow    Diandra Astudillo M.D.    "

## 2023-01-26 NOTE — PROGRESS NOTES
[facility] Medicine Daily Progress Note    Date of Service  1/25/2023    Chief Complaint  Tejas Yusuf is a 57 y.o. male 1/22/2023  transferred to our facility for higher level of care for hepatic abscess.     Hospital Course  Tejas Yusuf is a 57 y.o. male admitted 1/22/2023 with no significant past medical history found to have a cait-hepatic abscess and transferred to our facility for higher level of care. Abscess-pain started 1 week ago, poor appetite, progressively worse. No prior issues. No foreign travel or odd food ingestions.     Interval Problem Update  Reported more pain, increased pain meds  Denies nausea vomiting.  No bowel movement for 2 days    S/p drain placement by IR 1/23, culture positive for Streptococcus intermedius, b/c positive for strep  I discussed with ID, deescalate zosyn to unasyn    Sodium 131 -133  AST ALT 68/61-> 48/102, was normal at admission  WBC 11.2  A1c 11.6  + , increase lantus to 15 units  Phos 2.4    I have discussed this patient's plan of care and discharge plan at IDT rounds today with Case Management, Nursing, Nursing leadership, and other members of the IDT team.    Consultants/Specialty  IR, ID    Code Status  Full Code    Disposition  Patient is not medically cleared for discharge.   Anticipate discharge to to home with close outpatient follow-up.  I have placed the appropriate orders for post-discharge needs.    Review of Systems  Review of Systems   Constitutional:  Positive for malaise/fatigue. Negative for chills and fever.   HENT:  Negative for ear pain.    Eyes:  Negative for pain.   Respiratory:  Negative for shortness of breath and wheezing.    Cardiovascular:  Negative for chest pain and leg swelling.   Gastrointestinal:  Positive for abdominal pain. Negative for vomiting.        Poor appetite   Genitourinary:  Negative for dysuria, frequency and urgency.   Neurological:  Negative for headaches.   Psychiatric/Behavioral:  Negative for depression.    All  other systems reviewed and are negative.     Physical Exam  Temp:  [36.2 °C (97.2 °F)-36.4 °C (97.6 °F)] 36.2 °C (97.2 °F)  Pulse:  [78-93] 78  Resp:  [18] 18  BP: (115-134)/(53-81) 134/72  SpO2:  [93 %-95 %] 94 %    Physical Exam  Vitals and nursing note reviewed.   Constitutional:       General: He is not in acute distress.     Appearance: He is well-developed.      Comments: Comfortable appearing     HENT:      Head: Normocephalic and atraumatic.      Mouth/Throat:      Pharynx: No oropharyngeal exudate.   Eyes:      General: No scleral icterus.     Pupils: Pupils are equal, round, and reactive to light.   Neck:      Thyroid: No thyromegaly.   Cardiovascular:      Rate and Rhythm: Normal rate and regular rhythm.      Heart sounds: Normal heart sounds. No murmur heard.  Pulmonary:      Effort: Pulmonary effort is normal. No respiratory distress.      Breath sounds: Normal breath sounds. No wheezing.   Abdominal:      General: Bowel sounds are normal. There is no distension.      Palpations: Abdomen is soft.      Tenderness: There is abdominal tenderness (mild RUQ).   Musculoskeletal:         General: No tenderness. Normal range of motion.      Cervical back: Normal range of motion and neck supple.   Skin:     General: Skin is warm and dry.      Findings: No rash.   Neurological:      Mental Status: He is alert and oriented to person, place, and time.      Cranial Nerves: No cranial nerve deficit.       Fluids    Intake/Output Summary (Last 24 hours) at 1/25/2023 1657  Last data filed at 1/25/2023 1300  Gross per 24 hour   Intake 1080 ml   Output 40 ml   Net 1040 ml       Laboratory  Recent Labs     01/22/23 2047 01/24/23  0041 01/25/23  0514   WBC 11.5* 11.2* 11.6*   RBC 4.65* 4.74 4.61*   HEMOGLOBIN 13.8* 14.3 13.9*   HEMATOCRIT 42.0 43.4 41.8*   MCV 90.3 91.6 90.7   MCH 29.7 30.2 30.2   MCHC 32.9* 32.9* 33.3*   RDW 41.7 42.4 41.2   PLATELETCT 279 287 436   MPV 9.9 10.0 10.1     Recent Labs     01/22/23  2047  01/24/23  0041 01/25/23  0514   SODIUM 131* 133* 131*   POTASSIUM 4.4 4.2 4.4   CHLORIDE 94* 97 98   CO2 24 26 20   GLUCOSE 250* 215* 248*   BUN 15 13 13   CREATININE 0.78 0.74 0.67   CALCIUM 9.0 8.7 8.7     Recent Labs     01/22/23  2047   INR 1.19*         Recent Labs     01/23/23  0613   TRIGLYCERIDE 145   HDL 25*   LDL 89       Imaging  CT-DRAIN-PERITONEAL   Final Result      1.  CT GUIDED LIVER ABSCESS DRAINAGE WITH PLACEMENT OF A 10 [place] LOCKING LOOP PIGTAIL CATHETER. EVACUATION 65 ML BEIGE BLOOD-TINGED PUS.   2.  THE CURRENT PLAN IS TO MONITOR DRAINAGE OUTPUT AND OBTAIN A FOLLOWUP CT SCAN IN 5-7 DAYS IF CLINICALLY INDICATED. ORDERS WERE WRITTEN FOR TWICE DAILY IRRIGATION OF THE PIGTAIL CATHETER WITH 5 ML STERILE SALINE.      OUTSIDE IMAGES-CT CHEST   Final Result      OUTSIDE IMAGES-CT ABDOMEN /PELVIS   Final Result      US-RUQ    (Results Pending)        Assessment/Plan  * Hepatic abscess- (present on admission)  Assessment & Plan  Nargis-hepatic in nature  Unclear cause, blood cultures are ngtd,      Consider E histolytica ag assay, though pre-test probability is low    S/p drain placement by IR 1/23  S/p drain placement by IR 1/23, culture positive for Streptococcus intermedius, b/c positive for strep  I discussed with ID, deescalate zosyn to unasyn    Consult ID once cultures finalized    New onset type 2 diabetes mellitus (HCC)- (present on admission)  Assessment & Plan  BG high at admission likely   A1c 11.6    Started on SSI  BG still high, added Lantus 10 units    1/25 + , increase lantus to 15 units  Continue SSI,   Hypoglycemia protocol    Continue to monitor    Hypophosphatasia  Assessment & Plan  Replaced  Continue to monitor    Elevated liver enzymes  Assessment & Plan  Likely due to hepatic abscess   continue to monitor    AST ALT 68/61-> 48/102, was normal at admission  Ordered u/s RUQ      Abnormal CT of the chest- (present on admission)  Assessment & Plan  Outpatient follow  up    Positive D dimer- (present on admission)  Assessment & Plan  Unclear etiology    Hyponatremia- (present on admission)  Assessment & Plan  Mild  Continue to monitor    Sepsis (HCC)- (present on admission)  Assessment & Plan  Resolving  2/2 cait-hepatic abscess  See elsewhere         VTE prophylaxis: SCDs/TEDs and enoxaparin ppx    I have performed a physical exam and reviewed and updated ROS and Plan today (1/25/2023). In review of yesterday's note (1/24/2023), there are no changes except as documented above.

## 2023-01-26 NOTE — CARE PLAN
The patient is Stable - Low risk of patient condition declining or worsening    Shift Goals  Clinical Goals: pain control, infection free  Patient Goals: ambulation  Family Goals: N/A    Progress made toward(s) clinical / shift goals:  on going  Patient is   Problem: Knowledge Deficit - Standard  Goal: Patient and family/care givers will demonstrate understanding of plan of care, disease process/condition, diagnostic tests and medications  Outcome: Progressing     Problem: Pain - Standard  Goal: Alleviation of pain or a reduction in pain to the patient’s comfort goal  Outcome: Progressing     Problem: Fall Risk  Goal: Patient will remain free from falls  Outcome: Progressing    progressing towards the following goals:

## 2023-01-27 PROBLEM — R78.81 POSITIVE BLOOD CULTURE: Status: ACTIVE | Noted: 2023-01-27

## 2023-01-27 LAB
ALBUMIN SERPL BCP-MCNC: 2.6 G/DL (ref 3.2–4.9)
ALBUMIN/GLOB SERPL: 0.7 G/DL
ALP SERPL-CCNC: 96 U/L (ref 30–99)
ALT SERPL-CCNC: 76 U/L (ref 2–50)
ANION GAP SERPL CALC-SCNC: 9 MMOL/L (ref 7–16)
AST SERPL-CCNC: 30 U/L (ref 12–45)
BACTERIA BLD CULT: NORMAL
BILIRUB SERPL-MCNC: 0.3 MG/DL (ref 0.1–1.5)
BUN SERPL-MCNC: 11 MG/DL (ref 8–22)
CALCIUM ALBUM COR SERPL-MCNC: 9.8 MG/DL (ref 8.5–10.5)
CALCIUM SERPL-MCNC: 8.7 MG/DL (ref 8.5–10.5)
CHLORIDE SERPL-SCNC: 102 MMOL/L (ref 96–112)
CO2 SERPL-SCNC: 25 MMOL/L (ref 20–33)
CREAT SERPL-MCNC: 0.69 MG/DL (ref 0.5–1.4)
ERYTHROCYTE [DISTWIDTH] IN BLOOD BY AUTOMATED COUNT: 42.4 FL (ref 35.9–50)
GFR SERPLBLD CREATININE-BSD FMLA CKD-EPI: 106 ML/MIN/1.73 M 2
GLOBULIN SER CALC-MCNC: 3.9 G/DL (ref 1.9–3.5)
GLUCOSE BLD STRIP.AUTO-MCNC: 179 MG/DL (ref 65–99)
GLUCOSE BLD STRIP.AUTO-MCNC: 196 MG/DL (ref 65–99)
GLUCOSE BLD STRIP.AUTO-MCNC: 268 MG/DL (ref 65–99)
GLUCOSE BLD STRIP.AUTO-MCNC: 283 MG/DL (ref 65–99)
GLUCOSE SERPL-MCNC: 221 MG/DL (ref 65–99)
HCT VFR BLD AUTO: 41.2 % (ref 42–52)
HGB BLD-MCNC: 13.3 G/DL (ref 14–18)
MAGNESIUM SERPL-MCNC: 2 MG/DL (ref 1.5–2.5)
MCH RBC QN AUTO: 29.8 PG (ref 27–33)
MCHC RBC AUTO-ENTMCNC: 32.3 G/DL (ref 33.7–35.3)
MCV RBC AUTO: 92.2 FL (ref 81.4–97.8)
PHOSPHATE SERPL-MCNC: 3 MG/DL (ref 2.5–4.5)
PLATELET # BLD AUTO: 418 K/UL (ref 164–446)
PMV BLD AUTO: 9.4 FL (ref 9–12.9)
POTASSIUM SERPL-SCNC: 4.3 MMOL/L (ref 3.6–5.5)
PROT SERPL-MCNC: 6.5 G/DL (ref 6–8.2)
RBC # BLD AUTO: 4.47 M/UL (ref 4.7–6.1)
SIGNIFICANT IND 70042: NORMAL
SITE SITE: NORMAL
SODIUM SERPL-SCNC: 136 MMOL/L (ref 135–145)
SOURCE SOURCE: NORMAL
WBC # BLD AUTO: 8.9 K/UL (ref 4.8–10.8)

## 2023-01-27 PROCEDURE — 770006 HCHG ROOM/CARE - MED/SURG/GYN SEMI*

## 2023-01-27 PROCEDURE — 36415 COLL VENOUS BLD VENIPUNCTURE: CPT

## 2023-01-27 PROCEDURE — 700102 HCHG RX REV CODE 250 W/ 637 OVERRIDE(OP): Performed by: STUDENT IN AN ORGANIZED HEALTH CARE EDUCATION/TRAINING PROGRAM

## 2023-01-27 PROCEDURE — 84100 ASSAY OF PHOSPHORUS: CPT

## 2023-01-27 PROCEDURE — 82962 GLUCOSE BLOOD TEST: CPT | Mod: 91

## 2023-01-27 PROCEDURE — A9270 NON-COVERED ITEM OR SERVICE: HCPCS | Performed by: STUDENT IN AN ORGANIZED HEALTH CARE EDUCATION/TRAINING PROGRAM

## 2023-01-27 PROCEDURE — 700111 HCHG RX REV CODE 636 W/ 250 OVERRIDE (IP): Performed by: INTERNAL MEDICINE

## 2023-01-27 PROCEDURE — 700105 HCHG RX REV CODE 258: Performed by: INTERNAL MEDICINE

## 2023-01-27 PROCEDURE — 83735 ASSAY OF MAGNESIUM: CPT

## 2023-01-27 PROCEDURE — 99232 SBSQ HOSP IP/OBS MODERATE 35: CPT | Performed by: STUDENT IN AN ORGANIZED HEALTH CARE EDUCATION/TRAINING PROGRAM

## 2023-01-27 PROCEDURE — 99233 SBSQ HOSP IP/OBS HIGH 50: CPT | Performed by: INTERNAL MEDICINE

## 2023-01-27 PROCEDURE — 85027 COMPLETE CBC AUTOMATED: CPT

## 2023-01-27 PROCEDURE — 80053 COMPREHEN METABOLIC PANEL: CPT

## 2023-01-27 PROCEDURE — 700111 HCHG RX REV CODE 636 W/ 250 OVERRIDE (IP): Performed by: STUDENT IN AN ORGANIZED HEALTH CARE EDUCATION/TRAINING PROGRAM

## 2023-01-27 RX ADMIN — INSULIN HUMAN 1 UNITS: 100 INJECTION, SOLUTION PARENTERAL at 07:52

## 2023-01-27 RX ADMIN — AMPICILLIN AND SULBACTAM 3 G: 1; 2 INJECTION, POWDER, FOR SOLUTION INTRAMUSCULAR; INTRAVENOUS at 00:58

## 2023-01-27 RX ADMIN — ENOXAPARIN SODIUM 40 MG: 40 INJECTION SUBCUTANEOUS at 17:52

## 2023-01-27 RX ADMIN — HYDROMORPHONE HYDROCHLORIDE 1 MG: 1 INJECTION, SOLUTION INTRAMUSCULAR; INTRAVENOUS; SUBCUTANEOUS at 20:44

## 2023-01-27 RX ADMIN — INSULIN HUMAN 3 UNITS: 100 INJECTION, SOLUTION PARENTERAL at 17:47

## 2023-01-27 RX ADMIN — AMPICILLIN AND SULBACTAM 3 G: 1; 2 INJECTION, POWDER, FOR SOLUTION INTRAMUSCULAR; INTRAVENOUS at 12:33

## 2023-01-27 RX ADMIN — AMPICILLIN AND SULBACTAM 3 G: 1; 2 INJECTION, POWDER, FOR SOLUTION INTRAMUSCULAR; INTRAVENOUS at 17:53

## 2023-01-27 RX ADMIN — HYDROMORPHONE HYDROCHLORIDE 1 MG: 1 INJECTION, SOLUTION INTRAMUSCULAR; INTRAVENOUS; SUBCUTANEOUS at 03:46

## 2023-01-27 RX ADMIN — INSULIN HUMAN 3 UNITS: 100 INJECTION, SOLUTION PARENTERAL at 20:49

## 2023-01-27 RX ADMIN — AMPICILLIN AND SULBACTAM 3 G: 1; 2 INJECTION, POWDER, FOR SOLUTION INTRAMUSCULAR; INTRAVENOUS at 05:00

## 2023-01-27 RX ADMIN — INSULIN HUMAN 1 UNITS: 100 INJECTION, SOLUTION PARENTERAL at 12:28

## 2023-01-27 RX ADMIN — SENNOSIDES AND DOCUSATE SODIUM 2 TABLET: 50; 8.6 TABLET ORAL at 17:52

## 2023-01-27 ASSESSMENT — ENCOUNTER SYMPTOMS
DEPRESSION: 0
FEVER: 0
CHILLS: 0
ROS GI COMMENTS: POOR APPETITE
HEADACHES: 0
VOMITING: 0
NAUSEA: 0
SHORTNESS OF BREATH: 0
ABDOMINAL PAIN: 1
EYE PAIN: 0
WHEEZING: 0

## 2023-01-27 ASSESSMENT — PAIN DESCRIPTION - PAIN TYPE
TYPE: ACUTE PAIN

## 2023-01-27 ASSESSMENT — PATIENT HEALTH QUESTIONNAIRE - PHQ9
SUM OF ALL RESPONSES TO PHQ9 QUESTIONS 1 AND 2: 0
1. LITTLE INTEREST OR PLEASURE IN DOING THINGS: NOT AT ALL
2. FEELING DOWN, DEPRESSED, IRRITABLE, OR HOPELESS: NOT AT ALL

## 2023-01-27 NOTE — ASSESSMENT & PLAN NOTE
b/c 1/2 positive for Viridans Streptococcus  Source of hepatic abscess    Repeated blood culture 1/28    repeated abdominal CT  1/29 showed a decrease size of hepatic abscess  TTE unremarkable with EF of 55%, negative for vegetation    I discussed with ID, recommended at least 4 weeks of Augmentin until 2/25/2023  Repeat a CBC, CMP and CT scan in 4 weeks (ordered by ID)

## 2023-01-27 NOTE — CARE PLAN
The patient is Stable - Low risk of patient condition declining or worsening    Shift Goals  Clinical Goals: comfort  Patient Goals: pt will be able to rest and sleep comfortably  Family Goals: N/A    Progress made toward(s) clinical / shift goals:      Patient is not progressing towards the following goals:

## 2023-01-27 NOTE — PROGRESS NOTES
[facility] Medicine Daily Progress Note    Date of Service  1/27/2023    Chief Complaint  Tejas Yusuf is a 57 y.o. male 1/22/2023  transferred to our facility for higher level of care for hepatic abscess.     Hospital Course  Tejas Yusuf is a 57 y.o. male admitted 1/22/2023 with no significant past medical history found to have a cait-hepatic abscess and transferred to our facility for higher level of care. Abscess-pain started 1 week ago, poor appetite, progressively worse. No prior issues. No foreign travel or odd food ingestions.     Interval Problem Update  Reported abdominal pain, tolerable  Denies nausea vomiting.  Had a bowel movement today    S/p drain placement by IR 1/23, culture positive for Streptococcus intermedius,   b/c positive for Viridans Streptococcus, possible contamination  I discussed with ID, deescalate zosyn to unasyn    Sodium 131 -136  AST ALT 86/103-. 30/76, was normal at admission  WBC 11-9  A1c 11.6  increase lantus to 15 units, BG better 200-250    RUQ U/S Hepatomegaly and hepatic steatosis; Posterior right hepatic collection/abscess measures about 6.4 x 5.8 x 6.2 cm.    I have discussed this patient's plan of care and discharge plan at IDT rounds today with Case Management, Nursing, Nursing leadership, and other members of the IDT team.    Consultants/Specialty  IR, ID    Code Status  Full Code    Disposition  Patient is not medically cleared for discharge.   Anticipate discharge to to home with close outpatient follow-up.  I have placed the appropriate orders for post-discharge needs.    Review of Systems  Review of Systems   Constitutional:  Positive for malaise/fatigue. Negative for chills and fever.   HENT:  Negative for ear pain.    Eyes:  Negative for pain.   Respiratory:  Negative for shortness of breath and wheezing.    Cardiovascular:  Negative for chest pain and leg swelling.   Gastrointestinal:  Positive for abdominal pain. Negative for vomiting.        Poor appetite    Genitourinary:  Negative for dysuria, frequency and urgency.   Neurological:  Negative for headaches.   Psychiatric/Behavioral:  Negative for depression.    All other systems reviewed and are negative.     Physical Exam  Temp:  [36 °C (96.8 °F)-36.6 °C (97.9 °F)] 36.3 °C (97.3 °F)  Pulse:  [76-83] 83  Resp:  [17-19] 18  BP: (118-139)/(60-84) 136/84  SpO2:  [94 %-96 %] 94 %    Physical Exam  Vitals and nursing note reviewed.   Constitutional:       General: He is not in acute distress.     Appearance: He is well-developed.      Comments: Comfortable appearing     HENT:      Head: Normocephalic and atraumatic.      Mouth/Throat:      Pharynx: No oropharyngeal exudate.   Eyes:      General: No scleral icterus.     Pupils: Pupils are equal, round, and reactive to light.   Neck:      Thyroid: No thyromegaly.   Cardiovascular:      Rate and Rhythm: Normal rate and regular rhythm.      Heart sounds: Normal heart sounds. No murmur heard.  Pulmonary:      Effort: Pulmonary effort is normal. No respiratory distress.      Breath sounds: Normal breath sounds. No wheezing.   Abdominal:      General: Bowel sounds are normal. There is no distension.      Palpations: Abdomen is soft.      Tenderness: There is abdominal tenderness (mild RUQ).   Musculoskeletal:         General: No tenderness. Normal range of motion.      Cervical back: Normal range of motion and neck supple.   Skin:     General: Skin is warm and dry.      Findings: No rash.   Neurological:      Mental Status: He is alert and oriented to person, place, and time.      Cranial Nerves: No cranial nerve deficit.       Fluids    Intake/Output Summary (Last 24 hours) at 1/27/2023 1320  Last data filed at 1/27/2023 0900  Gross per 24 hour   Intake 610 ml   Output 17 ml   Net 593 ml       Laboratory  Recent Labs     01/25/23  0514 01/26/23  0711 01/27/23  0202   WBC 11.6* 10.0 8.9   RBC 4.61* 4.56* 4.47*   HEMOGLOBIN 13.9* 13.7* 13.3*   HEMATOCRIT 41.8* 41.5* 41.2*   MCV  90.7 91.0 92.2   MCH 30.2 30.0 29.8   MCHC 33.3* 33.0* 32.3*   RDW 41.2 42.0 42.4   PLATELETCT 436 410 418   MPV 10.1 9.3 9.4     Recent Labs     01/25/23  0514 01/26/23  0711 01/27/23  0202   SODIUM 131* 134* 136   POTASSIUM 4.4 4.6 4.3   CHLORIDE 98 96 102   CO2 20 27 25   GLUCOSE 248* 223* 221*   BUN 13 12 11   CREATININE 0.67 0.61 0.69   CALCIUM 8.7 8.9 8.7                       Imaging  US-RUQ   Final Result      1.  Hepatomegaly and hepatic steatosis.   2.  Posterior right hepatic collection/abscess measures about 6.4 x 5.8 x 6.2 cm.      CT-DRAIN-PERITONEAL   Final Result      1.  CT GUIDED LIVER ABSCESS DRAINAGE WITH PLACEMENT OF A 10 Armenian LOCKING LOOP PIGTAIL CATHETER. EVACUATION 65 ML BEIGE BLOOD-TINGED PUS.   2.  THE CURRENT PLAN IS TO MONITOR DRAINAGE OUTPUT AND OBTAIN A FOLLOWUP CT SCAN IN 5-7 DAYS IF CLINICALLY INDICATED. ORDERS WERE WRITTEN FOR TWICE DAILY IRRIGATION OF THE PIGTAIL CATHETER WITH 5 ML STERILE SALINE.      OUTSIDE IMAGES-CT CHEST   Final Result      OUTSIDE IMAGES-CT ABDOMEN /PELVIS   Final Result           Assessment/Plan  * Hepatic abscess- (present on admission)  Assessment & Plan  Nargis-hepatic in nature  Unclear cause, blood cultures are ngtd,      Consider E histolytica ag assay, though pre-test probability is low    S/p drain placement by IR 1/23  S/p drain placement by IR 1/23, culture positive for Streptococcus intermedius, b/c positive for strep  I discussed with ID, deescalate zosyn to unasyn    Consult ID once cultures finalized    New onset type 2 diabetes mellitus (HCC)- (present on admission)  Assessment & Plan  BG high at admission likely   A1c 11.6    Started on SSI  BG still high, added Lantus 10 units    increase lantus to 15 units, BG better 200-250  Continue SSI,   Hypoglycemia protocol    Continue to monitor    Positive blood culture  Assessment & Plan  b/c 1/2 positive for Viridans Streptococcus, possible contamination    Continue to  monitor    Hypophosphatasia  Assessment & Plan  Phos 2.3  replaced  Continue to monitor    Elevated liver enzymes  Assessment & Plan  AST ALT 68/61-> 86/103, was normal at admission  RUQ U/S Hepatomegaly and hepatic steatosis; Posterior right hepatic collection/abscess measures about 6.4 x 5.8 x 6.2 cm.    Likely due to hepatic abscess   continue to monitor    Trending down      Abnormal CT of the chest- (present on admission)  Assessment & Plan  Outpatient follow up    Positive D dimer- (present on admission)  Assessment & Plan  Unclear etiology    Hyponatremia- (present on admission)  Assessment & Plan  Mild  Continue to monitor    Sodium 136 today    Sepsis (HCC)- (present on admission)  Assessment & Plan  Resolving  2/2 cait-hepatic abscess  See elsewhere         VTE prophylaxis: SCDs/TEDs and enoxaparin ppx    I have performed a physical exam and reviewed and updated ROS and Plan today (1/27/2023). In review of yesterday's note (1/26/2023), there are no changes except as documented above.

## 2023-01-27 NOTE — PROGRESS NOTES
Infectious Disease Progress Note    Author: Diandra Astudillo M.D. Date & Time of service: 2023  11:49 AM    Chief Complaint:  Strep bacteremia  Strep liver abscess    Interval History:  57 y.o.  admitted as a transfer from [facility] on 2023 for abd pain and fever due to above   AF WBC c/o abd pain right sided/drain Denies SE antibiotics Plan of care reviewed  Labs Reviewed, Medications Reviewed, and Radiology Reviewed.    Review of Systems:  Review of Systems   Constitutional:  Negative for fever.   Gastrointestinal:  Positive for abdominal pain. Negative for nausea and vomiting.   All other systems reviewed and are negative.    Hemodynamics:  Temp (24hrs), Av.3 °C (97.4 °F), Min:36 °C (96.8 °F), Max:36.6 °C (97.9 °F)  Temperature: 36.3 °C (97.3 °F)  Pulse  Av.1  Min: 68  Max: 97   Blood Pressure: 136/84       Physical Exam:  Physical Exam  Vitals and nursing note reviewed.   Constitutional:       General: He is not in acute distress.     Appearance: He is not ill-appearing, toxic-appearing or diaphoretic.   HENT:      Nose: No rhinorrhea.   Eyes:      General: No scleral icterus.     Extraocular Movements: Extraocular movements intact.      Pupils: Pupils are equal, round, and reactive to light.   Cardiovascular:      Rate and Rhythm: Normal rate.   Pulmonary:      Effort: Pulmonary effort is normal. No respiratory distress.      Breath sounds: No stridor.   Abdominal:      General: There is no distension.      Palpations: Abdomen is soft.      Tenderness: There is abdominal tenderness.      Comments: Drain brown-grey purulent   Musculoskeletal:      Cervical back: Neck supple. No rigidity.   Skin:     Coloration: Skin is not jaundiced.   Neurological:      General: No focal deficit present.      Mental Status: He is alert and oriented to person, place, and time.   Psychiatric:         Mood and Affect: Mood normal.         Behavior: Behavior normal.       Meds:    Current  Facility-Administered Medications:     ampicillin-sulbactam (UNASYN) IV    insulin GLARGINE    HYDROmorphone    enoxaparin (LOVENOX) injection    senna-docusate **AND** polyethylene glycol/lytes **AND** magnesium hydroxide **AND** bisacodyl    acetaminophen    ondansetron    ondansetron    promethazine    promethazine    prochlorperazine    insulin regular **AND** POC blood glucose manual result **AND** NOTIFY MD and PharmD **AND** Administer 20 grams of glucose (approximately 8 ounces of fruit juice) every 15 minutes PRN FSBG less than 70 mg/dL **AND** dextrose bolus    Labs:  Recent Labs     01/25/23  0514 01/26/23  0711 01/27/23  0202   WBC 11.6* 10.0 8.9   RBC 4.61* 4.56* 4.47*   HEMOGLOBIN 13.9* 13.7* 13.3*   HEMATOCRIT 41.8* 41.5* 41.2*   MCV 90.7 91.0 92.2   MCH 30.2 30.0 29.8   RDW 41.2 42.0 42.4   PLATELETCT 436 410 418   MPV 10.1 9.3 9.4     Recent Labs     01/25/23  0514 01/26/23  0711 01/27/23  0202   SODIUM 131* 134* 136   POTASSIUM 4.4 4.6 4.3   CHLORIDE 98 96 102   CO2 20 27 25   GLUCOSE 248* 223* 221*   BUN 13 12 11     Recent Labs     01/25/23  0514 01/26/23  0711 01/27/23  0202   ALBUMIN 3.0* 2.9* 2.6*   TBILIRUBIN 0.4 0.3 0.3   ALKPHOSPHAT 115* 103* 96   TOTPROTEIN 7.1 6.6 6.5   ALTSGPT 102* 86* 76*   ASTSGOT 48* 34 30   CREATININE 0.67 0.61 0.69       Imaging:  CT-DRAIN-PERITONEAL    Result Date: 1/23/2023 1/23/2023 4:50 PM HISTORY/REASON FOR EXAM:  Drainage of 6.5 cm hypodense liver lesion on outside imaging, suspected liver abscess. Abdominal pain, fatigue, sepsis with fever 101, leukocytosis with WBC count 14 K at outside hospital. Remote history of diverticulitis in the 2000's. TECHNIQUE/EXAM DESCRIPTION: Liver abscess drainage with CT guidance. Low dose optimization technique was utilized for this CT exam including automated exposure control and adjustment of the mA and/or kV according to patient size. ALL ELEMENTS OF MAXIMAL STERILE BARRIER TECHNIQUE WERE FOLLOWED. SEDATION TIME: 30  minutes. Moderate sedation was administered with fentanyl and Versed with continuous patient monitoring by the interventional radiology nurse. PROCEDURE: Informed consent was obtained. Localizing CT images were obtained with the patient in supine position. The skin was prepped with ChloraPrep and draped in a sterile fashion. Moderate sedation was provided. Pulse oximetry and continuous cardiac monitoring by the nurse was performed throughout the exam. Intraservice time was 30 minutes. Following local anesthesia with 1% Lidocaine, a 17 G guiding needle was placed and needle path across the right lobe of the liver to the right lobe segment 7 posteriorly confirmed with CT. An Amplatz guidewire was placed and following serial tract dilatation, a 10 Japanese pigtail locking catheter was placed. A specimen was collected and submitted for culture and sensitivity and Gram stain. Total of 65 mL beige pus which was blood tinged was drained. The catheter was secured to the skin and connected to suction bulb drainage. Final CT images were obtained documenting catheter position. The patient tolerated the procedure well with no evidence of complication. COMPARISON: Outside films CT of the abdomen and pelvis with contrast 1/22/2023 FINDINGS: The final CT images show satisfactory catheter position within the target collection in the posterior right lobe at segment 7.     1.  CT GUIDED LIVER ABSCESS DRAINAGE WITH PLACEMENT OF A 10 Amharic LOCKING LOOP PIGTAIL CATHETER. EVACUATION 65 ML BEIGE BLOOD-TINGED PUS. 2.  THE CURRENT PLAN IS TO MONITOR DRAINAGE OUTPUT AND OBTAIN A FOLLOWUP CT SCAN IN 5-7 DAYS IF CLINICALLY INDICATED. ORDERS WERE WRITTEN FOR TWICE DAILY IRRIGATION OF THE PIGTAIL CATHETER WITH 5 ML STERILE SALINE.    US-RUQ    Result Date: 1/26/2023 1/26/2023 7:12 AM HISTORY/REASON FOR EXAM:  Abnormal Labs Abdominal pain TECHNIQUE/EXAM DESCRIPTION AND NUMBER OF VIEWS:  Real-time sonography of the liver and biliary tree.  "COMPARISON: CT abdomen and pelvis from outside facility 1/22/2023 FINDINGS: The liver is normal in contour with increased echogenicity. There is heterogeneous collection in the posterior right lobe, measuring about 6.4 x 5.8 x 6.2 cm presumably related to a hepatic abscess with drainage catheter in place. There are echogenic foci presumably related to air.. The liver measures 20.48 cm. The gallbladder is normal. There is no evidence of cholelithiasis. The gallbladder wall thickness measures 2.60 mm. There is no pericholecystic fluid. The common duct measures 4.80 mm. The visualized pancreas is unremarkable. The visualized aorta is normal in caliber. Intrahepatic IVC is patent. The portal vein is patent with hepatopetal flow. The MPV measures 1.21 cm. The right kidney measures 13.32 cm. There is no ascites.     1.  Hepatomegaly and hepatic steatosis. 2.  Posterior right hepatic collection/abscess measures about 6.4 x 5.8 x 6.2 cm.      Micro:  Results       Procedure Component Value Units Date/Time    BLOOD CULTURE [[id number]]  (Abnormal) Collected: 01/22/23 2047    Order Status: Completed Specimen: Blood from Peripheral Updated: 01/26/23 1253     Significant Indicator POS     Source BLD     Site PERIPHERAL     Culture Result Growth detected by Bactec instrument. 01/25/2023  05:59      Viridans Streptococcus  Possible Contaminant  Isolated from one set only, please correlate with clinical  condition. Contact the Microbiology department within 48 hr  if identification and susceptibility are needed.      Narrative:      CALL  [place]  61 tel. [phone],  CALLED  61 tel. [phone] 01/25/2023, 06:03, RB PERF. RESULTS CALLED  TO:[phone] RN  Per Hospital Policy: Only change Specimen Src: to \"Line\" if  specified by physician order.  Left AC    CULTURE WOUND W/ GRAM STAIN [[id number]]  (Abnormal) Collected: 01/23/23 1730    Order Status: Completed Specimen: Wound Updated: 01/26/23 0835     Significant Indicator POS     Source " "WND     Site liver abscess     Culture Result -     Gram Stain Result Many WBCs.  Many Gram positive cocci.       Culture Result Streptococcus intermedius  Heavy growth      GRAM STAIN [[id number]] Collected: 01/23/23 1730    Order Status: Completed Specimen: Wound Updated: 01/23/23 2300     Significant Indicator .     Source WND     Site liver abscess     Gram Stain Result Many WBCs.  Many Gram positive cocci.      FLUID CULTURE W/GRAM STAIN [[id number]]     Order Status: No result Specimen: Other Body Fluid     BLOOD CULTURE [[id number]] Collected: 01/22/23 2047    Order Status: Completed Specimen: Blood from Peripheral Updated: 01/23/23 0746     Significant Indicator NEG     Source BLD     Site PERIPHERAL     Culture Result No Growth  Note: Blood cultures are incubated for 5 days and  are monitored continuously.Positive blood cultures  are called to the RN and reported as soon as  they are identified.      Narrative:      Per Hospital Policy: Only change Specimen Src: to \"Line\" if  specified by physician order.  Right Hand    BLOOD CULTURE [[id number]]     Order Status: Canceled Specimen: Blood from Peripheral     BLOOD CULTURE [[id number]]     Order Status: Canceled Specimen: Blood from Peripheral             Assessment:  Active Hospital Problems    Diagnosis     *Hepatic abscess [K75.0]     Hypophosphatasia [E83.39]     Elevated liver enzymes [R74.8]     Sepsis (HCC) [A41.9]     New onset type 2 diabetes mellitus (HCC) [E11.9]     Hyponatremia [E87.1]     Positive D dimer [R79.89]     Abnormal CT of the chest [R93.89]    Strep sepsis/bacteremia-not a contaminant. Ontreatment  Liver abscess, on treatment  Diverticulitis  Leukocytosis, resolved        PLAN:   S/p drain liver abscess 1/23  -Culture strep intermedius (strep anginosis, subspecies strep viridans)  BCxs + strep, likely same as above  Repeat Bcxs every 48 hours until neg  Midline when Bcxs neg 48 hours  TTE; if unrevealing recommend RUBA if persistently " positive blood cultures  Continue Unasyn  Will need repeat CT 5-7 days from drain placement  Final antibiotic recommendations per culture results, FU CT and clinical course

## 2023-01-28 ENCOUNTER — APPOINTMENT (OUTPATIENT)
Dept: CARDIOLOGY | Facility: MEDICAL CENTER | Age: 58
DRG: 442 | End: 2023-01-28
Attending: STUDENT IN AN ORGANIZED HEALTH CARE EDUCATION/TRAINING PROGRAM
Payer: COMMERCIAL

## 2023-01-28 LAB
ALBUMIN SERPL BCP-MCNC: 3.5 G/DL (ref 3.2–4.9)
ALBUMIN/GLOB SERPL: 0.9 G/DL
ALP SERPL-CCNC: 105 U/L (ref 30–99)
ALT SERPL-CCNC: 72 U/L (ref 2–50)
ANION GAP SERPL CALC-SCNC: 13 MMOL/L (ref 7–16)
AST SERPL-CCNC: 32 U/L (ref 12–45)
BILIRUB SERPL-MCNC: 0.3 MG/DL (ref 0.1–1.5)
BUN SERPL-MCNC: 12 MG/DL (ref 8–22)
CALCIUM ALBUM COR SERPL-MCNC: 9.8 MG/DL (ref 8.5–10.5)
CALCIUM SERPL-MCNC: 9.4 MG/DL (ref 8.5–10.5)
CHLORIDE SERPL-SCNC: 98 MMOL/L (ref 96–112)
CO2 SERPL-SCNC: 26 MMOL/L (ref 20–33)
CREAT SERPL-MCNC: 0.76 MG/DL (ref 0.5–1.4)
ERYTHROCYTE [DISTWIDTH] IN BLOOD BY AUTOMATED COUNT: 43 FL (ref 35.9–50)
GFR SERPLBLD CREATININE-BSD FMLA CKD-EPI: 103 ML/MIN/1.73 M 2
GLOBULIN SER CALC-MCNC: 4.1 G/DL (ref 1.9–3.5)
GLUCOSE BLD STRIP.AUTO-MCNC: 187 MG/DL (ref 65–99)
GLUCOSE BLD STRIP.AUTO-MCNC: 234 MG/DL (ref 65–99)
GLUCOSE BLD STRIP.AUTO-MCNC: 240 MG/DL (ref 65–99)
GLUCOSE BLD STRIP.AUTO-MCNC: 274 MG/DL (ref 65–99)
GLUCOSE SERPL-MCNC: 218 MG/DL (ref 65–99)
HCT VFR BLD AUTO: 46.9 % (ref 42–52)
HGB BLD-MCNC: 15.1 G/DL (ref 14–18)
MCH RBC QN AUTO: 29.7 PG (ref 27–33)
MCHC RBC AUTO-ENTMCNC: 32.2 G/DL (ref 33.7–35.3)
MCV RBC AUTO: 92.3 FL (ref 81.4–97.8)
PLATELET # BLD AUTO: 564 K/UL (ref 164–446)
PMV BLD AUTO: 9.6 FL (ref 9–12.9)
POTASSIUM SERPL-SCNC: 4.7 MMOL/L (ref 3.6–5.5)
PROT SERPL-MCNC: 7.6 G/DL (ref 6–8.2)
RBC # BLD AUTO: 5.08 M/UL (ref 4.7–6.1)
SODIUM SERPL-SCNC: 137 MMOL/L (ref 135–145)
WBC # BLD AUTO: 9.7 K/UL (ref 4.8–10.8)

## 2023-01-28 PROCEDURE — 85027 COMPLETE CBC AUTOMATED: CPT

## 2023-01-28 PROCEDURE — 99233 SBSQ HOSP IP/OBS HIGH 50: CPT | Performed by: INTERNAL MEDICINE

## 2023-01-28 PROCEDURE — 93306 TTE W/DOPPLER COMPLETE: CPT

## 2023-01-28 PROCEDURE — 700111 HCHG RX REV CODE 636 W/ 250 OVERRIDE (IP): Performed by: STUDENT IN AN ORGANIZED HEALTH CARE EDUCATION/TRAINING PROGRAM

## 2023-01-28 PROCEDURE — 99232 SBSQ HOSP IP/OBS MODERATE 35: CPT | Performed by: STUDENT IN AN ORGANIZED HEALTH CARE EDUCATION/TRAINING PROGRAM

## 2023-01-28 PROCEDURE — A9270 NON-COVERED ITEM OR SERVICE: HCPCS | Performed by: STUDENT IN AN ORGANIZED HEALTH CARE EDUCATION/TRAINING PROGRAM

## 2023-01-28 PROCEDURE — 700102 HCHG RX REV CODE 250 W/ 637 OVERRIDE(OP)

## 2023-01-28 PROCEDURE — 36415 COLL VENOUS BLD VENIPUNCTURE: CPT

## 2023-01-28 PROCEDURE — A9270 NON-COVERED ITEM OR SERVICE: HCPCS

## 2023-01-28 PROCEDURE — 80053 COMPREHEN METABOLIC PANEL: CPT

## 2023-01-28 PROCEDURE — 770006 HCHG ROOM/CARE - MED/SURG/GYN SEMI*

## 2023-01-28 PROCEDURE — 87040 BLOOD CULTURE FOR BACTERIA: CPT | Mod: 91

## 2023-01-28 PROCEDURE — 700111 HCHG RX REV CODE 636 W/ 250 OVERRIDE (IP): Performed by: INTERNAL MEDICINE

## 2023-01-28 PROCEDURE — 700105 HCHG RX REV CODE 258: Performed by: INTERNAL MEDICINE

## 2023-01-28 PROCEDURE — 700102 HCHG RX REV CODE 250 W/ 637 OVERRIDE(OP): Performed by: STUDENT IN AN ORGANIZED HEALTH CARE EDUCATION/TRAINING PROGRAM

## 2023-01-28 PROCEDURE — 82962 GLUCOSE BLOOD TEST: CPT | Mod: 91

## 2023-01-28 RX ORDER — ACETAMINOPHEN 325 MG/1
650 TABLET ORAL EVERY 6 HOURS PRN
Status: DISCONTINUED | OUTPATIENT
Start: 2023-01-28 | End: 2023-01-30 | Stop reason: HOSPADM

## 2023-01-28 RX ORDER — OXYCODONE HYDROCHLORIDE 5 MG/1
5 TABLET ORAL EVERY 6 HOURS PRN
Status: DISCONTINUED | OUTPATIENT
Start: 2023-01-28 | End: 2023-01-30 | Stop reason: HOSPADM

## 2023-01-28 RX ADMIN — INSULIN HUMAN 2 UNITS: 100 INJECTION, SOLUTION PARENTERAL at 17:51

## 2023-01-28 RX ADMIN — INSULIN HUMAN 1 UNITS: 100 INJECTION, SOLUTION PARENTERAL at 09:04

## 2023-01-28 RX ADMIN — OXYCODONE HYDROCHLORIDE 5 MG: 5 TABLET ORAL at 20:15

## 2023-01-28 RX ADMIN — SENNOSIDES AND DOCUSATE SODIUM 2 TABLET: 50; 8.6 TABLET ORAL at 17:52

## 2023-01-28 RX ADMIN — AMPICILLIN AND SULBACTAM 3 G: 1; 2 INJECTION, POWDER, FOR SOLUTION INTRAMUSCULAR; INTRAVENOUS at 00:06

## 2023-01-28 RX ADMIN — SENNOSIDES AND DOCUSATE SODIUM 2 TABLET: 50; 8.6 TABLET ORAL at 05:05

## 2023-01-28 RX ADMIN — INSULIN HUMAN 2 UNITS: 100 INJECTION, SOLUTION PARENTERAL at 12:30

## 2023-01-28 RX ADMIN — AMPICILLIN AND SULBACTAM 3 G: 1; 2 INJECTION, POWDER, FOR SOLUTION INTRAMUSCULAR; INTRAVENOUS at 05:07

## 2023-01-28 RX ADMIN — ENOXAPARIN SODIUM 40 MG: 40 INJECTION SUBCUTANEOUS at 17:53

## 2023-01-28 RX ADMIN — INSULIN HUMAN 3 UNITS: 100 INJECTION, SOLUTION PARENTERAL at 20:17

## 2023-01-28 RX ADMIN — OXYCODONE HYDROCHLORIDE 5 MG: 5 TABLET ORAL at 12:44

## 2023-01-28 RX ADMIN — AMPICILLIN AND SULBACTAM 3 G: 1; 2 INJECTION, POWDER, FOR SOLUTION INTRAMUSCULAR; INTRAVENOUS at 11:30

## 2023-01-28 RX ADMIN — AMPICILLIN AND SULBACTAM 3 G: 1; 2 INJECTION, POWDER, FOR SOLUTION INTRAMUSCULAR; INTRAVENOUS at 17:55

## 2023-01-28 ASSESSMENT — ENCOUNTER SYMPTOMS
HEADACHES: 0
SHORTNESS OF BREATH: 0
FEVER: 0
DEPRESSION: 0
EYE PAIN: 0
VOMITING: 0
ABDOMINAL PAIN: 1
DIARRHEA: 0
NAUSEA: 0
ROS GI COMMENTS: POOR APPETITE
WHEEZING: 0
CHILLS: 0

## 2023-01-28 ASSESSMENT — FIBROSIS 4 INDEX: FIB4 SCORE: 0.39

## 2023-01-28 ASSESSMENT — PAIN DESCRIPTION - PAIN TYPE: TYPE: ACUTE PAIN

## 2023-01-28 NOTE — PROGRESS NOTES
Infectious Disease Progress Note    Author: Diandra Astudillo M.D. Date & Time of service: 2023  1:02 PM    Chief Complaint:  liver abscess +blood cxs     Interval History:  57 y.o.  admitted as a transfer from [facility] on 2023 for abd pain and fever    AF WBC 9.7 feels better-tolerating abx-watching a documentary  Labs Reviewed and Medications Reviewed.    Review of Systems:  Review of Systems   Constitutional:  Negative for fever.   Gastrointestinal:  Positive for abdominal pain. Negative for diarrhea, nausea and vomiting.   All other systems reviewed and are negative.    Hemodynamics:  Temp (24hrs), Av.5 °C (97.7 °F), Min:36.1 °C (97 °F), Max:36.8 °C (98.3 °F)  Temperature: 36.1 °C (97 °F)  Pulse  Av.2  Min: 65  Max: 97   Blood Pressure: 122/76       Physical Exam:  Physical Exam  Vitals and nursing note reviewed.   Constitutional:       General: He is not in acute distress.     Appearance: He is not ill-appearing, toxic-appearing or diaphoretic.   Eyes:      General: No scleral icterus.     Extraocular Movements: Extraocular movements intact.      Pupils: Pupils are equal, round, and reactive to light.   Cardiovascular:      Rate and Rhythm: Normal rate.   Pulmonary:      Effort: Pulmonary effort is normal. No respiratory distress.      Breath sounds: No stridor.   Abdominal:      General: There is no distension.      Palpations: Abdomen is soft.      Tenderness: There is abdominal tenderness.      Comments: Drain RUQ purulent   Skin:     Coloration: Skin is not jaundiced.   Neurological:      General: No focal deficit present.      Mental Status: He is alert and oriented to person, place, and time.   Psychiatric:         Mood and Affect: Mood normal.         Behavior: Behavior normal.       Meds:    Current Facility-Administered Medications:     acetaminophen    oxyCODONE immediate-release    insulin GLARGINE    ampicillin-sulbactam (UNASYN) IV    HYDROmorphone    enoxaparin  (LOVENOX) injection    senna-docusate **AND** polyethylene glycol/lytes **AND** magnesium hydroxide **AND** bisacodyl    ondansetron    ondansetron    promethazine    promethazine    prochlorperazine    insulin regular **AND** POC blood glucose manual result **AND** NOTIFY MD and PharmD **AND** Administer 20 grams of glucose (approximately 8 ounces of fruit juice) every 15 minutes PRN FSBG less than 70 mg/dL **AND** dextrose bolus    Labs:  Recent Labs     01/26/23  0711 01/27/23  0202 01/28/23  0555   WBC 10.0 8.9 9.7   RBC 4.56* 4.47* 5.08   HEMOGLOBIN 13.7* 13.3* 15.1   HEMATOCRIT 41.5* 41.2* 46.9   MCV 91.0 92.2 92.3   MCH 30.0 29.8 29.7   RDW 42.0 42.4 43.0   PLATELETCT 410 418 564*   MPV 9.3 9.4 9.6     Recent Labs     01/26/23  0711 01/27/23  0202 01/28/23  0555   SODIUM 134* 136 137   POTASSIUM 4.6 4.3 4.7   CHLORIDE 96 102 98   CO2 27 25 26   GLUCOSE 223* 221* 218*   BUN 12 11 12     Recent Labs     01/26/23  0711 01/27/23  0202 01/28/23  0555   ALBUMIN 2.9* 2.6* 3.5   TBILIRUBIN 0.3 0.3 0.3   ALKPHOSPHAT 103* 96 105*   TOTPROTEIN 6.6 6.5 7.6   ALTSGPT 86* 76* 72*   ASTSGOT 34 30 32   CREATININE 0.61 0.69 0.76       Imaging:  CT-DRAIN-PERITONEAL    Result Date: 1/23/2023 1/23/2023 4:50 PM HISTORY/REASON FOR EXAM:  Drainage of 6.5 cm hypodense liver lesion on outside imaging, suspected liver abscess. Abdominal pain, fatigue, sepsis with fever 101, leukocytosis with WBC count 14 K at outside hospital. Remote history of diverticulitis in the 2000's. TECHNIQUE/EXAM DESCRIPTION: Liver abscess drainage with CT guidance. Low dose optimization technique was utilized for this CT exam including automated exposure control and adjustment of the mA and/or kV according to patient size. ALL ELEMENTS OF MAXIMAL STERILE BARRIER TECHNIQUE WERE FOLLOWED. SEDATION TIME: 30 minutes. Moderate sedation was administered with fentanyl and Versed with continuous patient monitoring by the interventional radiology nurse. PROCEDURE:  Informed consent was obtained. Localizing CT images were obtained with the patient in supine position. The skin was prepped with ChloraPrep and draped in a sterile fashion. Moderate sedation was provided. Pulse oximetry and continuous cardiac monitoring by the nurse was performed throughout the exam. Intraservice time was 30 minutes. Following local anesthesia with 1% Lidocaine, a 17 G guiding needle was placed and needle path across the right lobe of the liver to the right lobe segment 7 posteriorly confirmed with CT. An Amplatz guidewire was placed and following serial tract dilatation, a 10 Taiwanese pigtail locking catheter was placed. A specimen was collected and submitted for culture and sensitivity and Gram stain. Total of 65 mL beige pus which was blood tinged was drained. The catheter was secured to the skin and connected to suction bulb drainage. Final CT images were obtained documenting catheter position. The patient tolerated the procedure well with no evidence of complication. COMPARISON: Outside films CT of the abdomen and pelvis with contrast 1/22/2023 FINDINGS: The final CT images show satisfactory catheter position within the target collection in the posterior right lobe at segment 7.     1.  CT GUIDED LIVER ABSCESS DRAINAGE WITH PLACEMENT OF A 10 Yi LOCKING LOOP PIGTAIL CATHETER. EVACUATION 65 ML BEIGE BLOOD-TINGED PUS. 2.  THE CURRENT PLAN IS TO MONITOR DRAINAGE OUTPUT AND OBTAIN A FOLLOWUP CT SCAN IN 5-7 DAYS IF CLINICALLY INDICATED. ORDERS WERE WRITTEN FOR TWICE DAILY IRRIGATION OF THE PIGTAIL CATHETER WITH 5 ML STERILE SALINE.    US-RUQ    Result Date: 1/26/2023 1/26/2023 7:12 AM HISTORY/REASON FOR EXAM:  Abnormal Labs Abdominal pain TECHNIQUE/EXAM DESCRIPTION AND NUMBER OF VIEWS:  Real-time sonography of the liver and biliary tree. COMPARISON: CT abdomen and pelvis from outside facility 1/22/2023 FINDINGS: The liver is normal in contour with increased echogenicity. There is heterogeneous  "collection in the posterior right lobe, measuring about 6.4 x 5.8 x 6.2 cm presumably related to a hepatic abscess with drainage catheter in place. There are echogenic foci presumably related to air.. The liver measures 20.48 cm. The gallbladder is normal. There is no evidence of cholelithiasis. The gallbladder wall thickness measures 2.60 mm. There is no pericholecystic fluid. The common duct measures 4.80 mm. The visualized pancreas is unremarkable. The visualized aorta is normal in caliber. Intrahepatic IVC is patent. The portal vein is patent with hepatopetal flow. The MPV measures 1.21 cm. The right kidney measures 13.32 cm. There is no ascites.     1.  Hepatomegaly and hepatic steatosis. 2.  Posterior right hepatic collection/abscess measures about 6.4 x 5.8 x 6.2 cm.      Micro:  Results       Procedure Component Value Units Date/Time    BLOOD CULTURE [[id number]] Collected: 01/28/23 0829    Order Status: Sent Specimen: Blood from Peripheral Updated: 01/28/23 0957    Narrative:      Per Hospital Policy: Only change Specimen Src: to \"Line\" if  specified by physician order.    BLOOD CULTURE [[id number]] Collected: 01/28/23 0836    Order Status: Sent Specimen: Blood from Peripheral Updated: 01/28/23 0956    Narrative:      Per Hospital Policy: Only change Specimen Src: to \"Line\" if  specified by physician order.    BLOOD CULTURE [[id number]] Collected: 01/22/23 2047    Order Status: Completed Specimen: Blood from Peripheral Updated: 01/27/23 2300     Significant Indicator NEG     Source BLD     Site PERIPHERAL     Culture Result No growth after 5 days of incubation.    Narrative:      Per Hospital Policy: Only change Specimen Src: to \"Line\" if  specified by physician order.  Right Hand    BLOOD CULTURE [[id number]]  (Abnormal) Collected: 01/22/23 2047    Order Status: Completed Specimen: Blood from Peripheral Updated: 01/26/23 1253     Significant Indicator POS     Source BLD     Site PERIPHERAL     Culture Result " "Growth detected by Bactec instrument. 01/25/2023  05:59      Viridans Streptococcus  Possible Contaminant  Isolated from one set only, please correlate with clinical  condition. Contact the Microbiology department within 48 hr  if identification and susceptibility are needed.      Narrative:      CALL  [place]  61 tel. [phone],  CALLED  61 tel. [phone] 01/25/2023, 06:03, RB PERF. RESULTS CALLED  TO:[phone] RN  Per Hospital Policy: Only change Specimen Src: to \"Line\" if  specified by physician order.  Left AC    CULTURE WOUND W/ GRAM STAIN [[id number]]  (Abnormal) Collected: 01/23/23 1730    Order Status: Completed Specimen: Wound Updated: 01/26/23 0835     Significant Indicator POS     Source WND     Site liver abscess     Culture Result -     Gram Stain Result Many WBCs.  Many Gram positive cocci.       Culture Result Streptococcus intermedius  Heavy growth      GRAM STAIN [[id number]] Collected: 01/23/23 1730    Order Status: Completed Specimen: Wound Updated: 01/23/23 2300     Significant Indicator .     Source WND     Site liver abscess     Gram Stain Result Many WBCs.  Many Gram positive cocci.      FLUID CULTURE W/GRAM STAIN [[id number]]     Order Status: No result Specimen: Other Body Fluid     BLOOD CULTURE [[id number]]     Order Status: Canceled Specimen: Blood from Peripheral     BLOOD CULTURE [[id number]]     Order Status: Canceled Specimen: Blood from Peripheral             Assessment:  Active Hospital Problems    Diagnosis     *Hepatic abscess [K75.0]     Bacteremia [R78.81]     Hypophosphatasia [E83.39]     Elevated liver enzymes [R74.8]     Sepsis (HCC) [A41.9]     New onset type 2 diabetes mellitus (HCC) [E11.9]     Hyponatremia [E87.1]     Positive D dimer [R79.89]     Abnormal CT of the chest [R93.89]      Strep viridans sepsis  Liver abscess viridans, nilay. intermedius  Diverticulitis  Leukocytosis        PLAN:   S/p drain liver abscess 1/23  -Culture strep intermedius (strep anginosis)  BCxs + strep " viridans  Repeat Bcxs ordered  Midline when Bcxs neg 48 hours  TTE; if unrevealing recommend RUBA if persistently positive blood cultures  Continue Unasyn  Will need repeat CT 5-7 days from drain placement  Please remove other patient information from outside medical records scanned on 1/23 if not already done  Final antibiotic recommendations per culture results, repeat CT, and clinical course              Plan of care discussed with CRHIS Morse M.D..

## 2023-01-28 NOTE — CARE PLAN
The patient is Stable - Low risk of patient condition declining or worsening    Shift Goals  Clinical Goals: pain management  Patient Goals: ambulation  Family Goals: N/A    Progress made toward(s) clinical / shift goals:  Pt pain is managed by prn pain medication with good effects.    Patient is not progressing towards the following goals:

## 2023-01-28 NOTE — PROGRESS NOTES
Pt is alert and oriented x4, on room air.PIV dressing CDI. Continent of bowel and bladder. All needs attended

## 2023-01-28 NOTE — PROGRESS NOTES
[facility] Medicine Daily Progress Note    Date of Service  1/28/2023    Chief Complaint  Tejas Yusuf is a 57 y.o. male 1/22/2023  transferred to our facility for higher level of care for hepatic abscess.     Hospital Course  Tejas Yusuf is a 57 y.o. male admitted 1/22/2023 with no significant past medical history found to have a cait-hepatic abscess and transferred to our facility for higher level of care. Abscess-pain started 1 week ago, poor appetite, progressively worse. No prior issues. No foreign travel or odd food ingestions.     RUQ U/S Hepatomegaly and hepatic steatosis; Posterior right hepatic collection/abscess measures about 6.4 x 5.8 x 6.2 cm    Interval Problem Update  Reported abdominal pain, tolerable  Denies nausea vomiting.  Had a bowel movement today    S/p drain placement by IR 1/23, culture positive for Streptococcus intermedius,   b/c 1/22 positive for Viridans Streptococcus  I discussed with ID, deescalate zosyn to unasyn,   I ordered repeated abdominal CT tomorrow 1/29  I ordered TTE    AST ALT 86/103- 72/105, was normal at admission  WBC 11-9  BG still high 180-250, increase lantus to 20 units,       I have discussed this patient's plan of care and discharge plan at IDT rounds today with Case Management, Nursing, Nursing leadership, and other members of the IDT team.    Consultants/Specialty  IR, ID    Code Status  Full Code    Disposition  Patient is not medically cleared for discharge.   Anticipate discharge to to home with close outpatient follow-up.  I have placed the appropriate orders for post-discharge needs.    Review of Systems  Review of Systems   Constitutional:  Positive for malaise/fatigue. Negative for chills and fever.   HENT:  Negative for ear pain.    Eyes:  Negative for pain.   Respiratory:  Negative for shortness of breath and wheezing.    Cardiovascular:  Negative for chest pain and leg swelling.   Gastrointestinal:  Positive for abdominal pain. Negative for vomiting.         Poor appetite   Genitourinary:  Negative for dysuria, frequency and urgency.   Neurological:  Negative for headaches.   Psychiatric/Behavioral:  Negative for depression.    All other systems reviewed and are negative.     Physical Exam  Temp:  [36.1 °C (97 °F)-36.8 °C (98.3 °F)] 36.1 °C (97 °F)  Pulse:  [65-87] 81  Resp:  [16-18] 17  BP: (117-122)/(66-83) 122/76  SpO2:  [94 %-97 %] 97 %    Physical Exam  Vitals and nursing note reviewed.   Constitutional:       General: He is not in acute distress.     Appearance: He is well-developed.      Comments: Comfortable appearing     HENT:      Head: Normocephalic and atraumatic.      Mouth/Throat:      Pharynx: No oropharyngeal exudate.   Eyes:      General: No scleral icterus.     Pupils: Pupils are equal, round, and reactive to light.   Neck:      Thyroid: No thyromegaly.   Cardiovascular:      Rate and Rhythm: Normal rate and regular rhythm.      Heart sounds: Normal heart sounds. No murmur heard.  Pulmonary:      Effort: Pulmonary effort is normal. No respiratory distress.      Breath sounds: Normal breath sounds. No wheezing.   Abdominal:      General: Bowel sounds are normal. There is no distension.      Palpations: Abdomen is soft.      Tenderness: There is abdominal tenderness (mild RUQ).   Musculoskeletal:         General: No tenderness. Normal range of motion.      Cervical back: Normal range of motion and neck supple.   Skin:     General: Skin is warm and dry.      Findings: No rash.   Neurological:      Mental Status: He is alert and oriented to person, place, and time.      Cranial Nerves: No cranial nerve deficit.       Fluids    Intake/Output Summary (Last 24 hours) at 1/28/2023 1304  Last data filed at 1/28/2023 0900  Gross per 24 hour   Intake 898 ml   Output 18 ml   Net 880 ml       Laboratory  Recent Labs     01/26/23  0711 01/27/23  0202 01/28/23  0555   WBC 10.0 8.9 9.7   RBC 4.56* 4.47* 5.08   HEMOGLOBIN 13.7* 13.3* 15.1   HEMATOCRIT 41.5*  41.2* 46.9   MCV 91.0 92.2 92.3   MCH 30.0 29.8 29.7   MCHC 33.0* 32.3* 32.2*   RDW 42.0 42.4 43.0   PLATELETCT 410 418 564*   MPV 9.3 9.4 9.6     Recent Labs     01/26/23  0711 01/27/23  0202 01/28/23  0555   SODIUM 134* 136 137   POTASSIUM 4.6 4.3 4.7   CHLORIDE 96 102 98   CO2 27 25 26   GLUCOSE 223* 221* 218*   BUN 12 11 12   CREATININE 0.61 0.69 0.76   CALCIUM 8.9 8.7 9.4                       Imaging  US-RUQ   Final Result      1.  Hepatomegaly and hepatic steatosis.   2.  Posterior right hepatic collection/abscess measures about 6.4 x 5.8 x 6.2 cm.      CT-DRAIN-PERITONEAL   Final Result      1.  CT GUIDED LIVER ABSCESS DRAINAGE WITH PLACEMENT OF A 10 [place] LOCKING LOOP PIGTAIL CATHETER. EVACUATION 65 ML BEIGE BLOOD-TINGED PUS.   2.  THE CURRENT PLAN IS TO MONITOR DRAINAGE OUTPUT AND OBTAIN A FOLLOWUP CT SCAN IN 5-7 DAYS IF CLINICALLY INDICATED. ORDERS WERE WRITTEN FOR TWICE DAILY IRRIGATION OF THE PIGTAIL CATHETER WITH 5 ML STERILE SALINE.      OUTSIDE IMAGES-CT CHEST   Final Result      OUTSIDE IMAGES-CT ABDOMEN /PELVIS   Final Result      EC-ECHOCARDIOGRAM COMPLETE W/O CONT    (Results Pending)        Assessment/Plan  * Hepatic abscess- (present on admission)  Assessment & Plan  Nargis-hepatic in nature  Unclear cause, blood cultures are ngtd,      Consider E histolytica ag assay, though pre-test probability is low    S/p drain placement by IR 1/23  S/p drain placement by IR 1/23, culture positive for Streptococcus intermedius, b/c positive for strep  I discussed with ID, deescalate zosyn to unasyn    Consult ID once cultures finalized    New onset type 2 diabetes mellitus (HCC)- (present on admission)  Assessment & Plan  BG high at admission likely   A1c 11.6    Started on SSI  BG still high, added Lantus 10 units  increase lantus to 15 units    1/28 BG still high 180-250, increase lantus to 20 units,   Continue SSI,   Hypoglycemia protocol    Continue to monitor    Bacteremia  Assessment & Plan  b/c 1/2  positive for Viridans Streptococcus  Source of hepatic abscess    Repeated blood culture 1/28    Discussed with ID, recommended TTE, may need midline  Repeat CT 5 to 7 days post drain placement    I ordered repeated abdominal CT tomorrow 1/29  I ordered TTE    Continue to monitor    Hypophosphatasia  Assessment & Plan  Phos 2.3  replaced  Continue to monitor    Elevated liver enzymes  Assessment & Plan  AST ALT 68/61-> 86/103, was normal at admission  RUQ U/S Hepatomegaly and hepatic steatosis; Posterior right hepatic collection/abscess measures about 6.4 x 5.8 x 6.2 cm.    Likely due to hepatic abscess   continue to monitor    Now Trending down      Abnormal CT of the chest- (present on admission)  Assessment & Plan  Outpatient follow up    Positive D dimer- (present on admission)  Assessment & Plan  Unclear etiology    Hyponatremia- (present on admission)  Assessment & Plan  Mild  Continue to monitor    Sodium 136 today    Sepsis (HCC)- (present on admission)  Assessment & Plan  Resolving  2/2 cait-hepatic abscess  See elsewhere         VTE prophylaxis: SCDs/TEDs and enoxaparin ppx    I have performed a physical exam and reviewed and updated ROS and Plan today (1/28/2023). In review of yesterday's note (1/27/2023), there are no changes except as documented above.

## 2023-01-29 ENCOUNTER — APPOINTMENT (OUTPATIENT)
Dept: RADIOLOGY | Facility: MEDICAL CENTER | Age: 58
DRG: 442 | End: 2023-01-29
Attending: STUDENT IN AN ORGANIZED HEALTH CARE EDUCATION/TRAINING PROGRAM
Payer: COMMERCIAL

## 2023-01-29 LAB
ALBUMIN SERPL BCP-MCNC: 3.3 G/DL (ref 3.2–4.9)
ALBUMIN/GLOB SERPL: 0.9 G/DL
ALP SERPL-CCNC: 92 U/L (ref 30–99)
ALT SERPL-CCNC: 60 U/L (ref 2–50)
ANION GAP SERPL CALC-SCNC: 8 MMOL/L (ref 7–16)
AST SERPL-CCNC: 29 U/L (ref 12–45)
BILIRUB SERPL-MCNC: 0.3 MG/DL (ref 0.1–1.5)
BUN SERPL-MCNC: 10 MG/DL (ref 8–22)
CALCIUM ALBUM COR SERPL-MCNC: 9.6 MG/DL (ref 8.5–10.5)
CALCIUM SERPL-MCNC: 9 MG/DL (ref 8.5–10.5)
CHLORIDE SERPL-SCNC: 98 MMOL/L (ref 96–112)
CO2 SERPL-SCNC: 27 MMOL/L (ref 20–33)
CREAT SERPL-MCNC: 0.73 MG/DL (ref 0.5–1.4)
ERYTHROCYTE [DISTWIDTH] IN BLOOD BY AUTOMATED COUNT: 43.1 FL (ref 35.9–50)
GFR SERPLBLD CREATININE-BSD FMLA CKD-EPI: 105 ML/MIN/1.73 M 2
GLOBULIN SER CALC-MCNC: 3.5 G/DL (ref 1.9–3.5)
GLUCOSE BLD STRIP.AUTO-MCNC: 161 MG/DL (ref 65–99)
GLUCOSE BLD STRIP.AUTO-MCNC: 206 MG/DL (ref 65–99)
GLUCOSE BLD STRIP.AUTO-MCNC: 227 MG/DL (ref 65–99)
GLUCOSE BLD STRIP.AUTO-MCNC: 243 MG/DL (ref 65–99)
GLUCOSE SERPL-MCNC: 182 MG/DL (ref 65–99)
HCT VFR BLD AUTO: 43.9 % (ref 42–52)
HGB BLD-MCNC: 14.1 G/DL (ref 14–18)
LV EJECT FRACT  99904: 55
LV EJECT FRACT MOD 2C 99903: 38.98
LV EJECT FRACT MOD 4C 99902: 51.63
LV EJECT FRACT MOD BP 99901: 45.39
MCH RBC QN AUTO: 29.7 PG (ref 27–33)
MCHC RBC AUTO-ENTMCNC: 32.1 G/DL (ref 33.7–35.3)
MCV RBC AUTO: 92.6 FL (ref 81.4–97.8)
PLATELET # BLD AUTO: 483 K/UL (ref 164–446)
PMV BLD AUTO: 9 FL (ref 9–12.9)
POTASSIUM SERPL-SCNC: 4.4 MMOL/L (ref 3.6–5.5)
PROT SERPL-MCNC: 6.8 G/DL (ref 6–8.2)
RBC # BLD AUTO: 4.74 M/UL (ref 4.7–6.1)
SODIUM SERPL-SCNC: 133 MMOL/L (ref 135–145)
WBC # BLD AUTO: 8.3 K/UL (ref 4.8–10.8)

## 2023-01-29 PROCEDURE — 74177 CT ABD & PELVIS W/CONTRAST: CPT

## 2023-01-29 PROCEDURE — 700105 HCHG RX REV CODE 258: Performed by: INTERNAL MEDICINE

## 2023-01-29 PROCEDURE — 80053 COMPREHEN METABOLIC PANEL: CPT

## 2023-01-29 PROCEDURE — 93306 TTE W/DOPPLER COMPLETE: CPT | Mod: 26 | Performed by: INTERNAL MEDICINE

## 2023-01-29 PROCEDURE — A9270 NON-COVERED ITEM OR SERVICE: HCPCS

## 2023-01-29 PROCEDURE — 700102 HCHG RX REV CODE 250 W/ 637 OVERRIDE(OP): Performed by: STUDENT IN AN ORGANIZED HEALTH CARE EDUCATION/TRAINING PROGRAM

## 2023-01-29 PROCEDURE — 700102 HCHG RX REV CODE 250 W/ 637 OVERRIDE(OP)

## 2023-01-29 PROCEDURE — 82962 GLUCOSE BLOOD TEST: CPT

## 2023-01-29 PROCEDURE — 99232 SBSQ HOSP IP/OBS MODERATE 35: CPT | Performed by: STUDENT IN AN ORGANIZED HEALTH CARE EDUCATION/TRAINING PROGRAM

## 2023-01-29 PROCEDURE — 700111 HCHG RX REV CODE 636 W/ 250 OVERRIDE (IP): Performed by: INTERNAL MEDICINE

## 2023-01-29 PROCEDURE — 85027 COMPLETE CBC AUTOMATED: CPT

## 2023-01-29 PROCEDURE — 99233 SBSQ HOSP IP/OBS HIGH 50: CPT | Performed by: INTERNAL MEDICINE

## 2023-01-29 PROCEDURE — 700117 HCHG RX CONTRAST REV CODE 255: Performed by: STUDENT IN AN ORGANIZED HEALTH CARE EDUCATION/TRAINING PROGRAM

## 2023-01-29 PROCEDURE — 700111 HCHG RX REV CODE 636 W/ 250 OVERRIDE (IP): Performed by: STUDENT IN AN ORGANIZED HEALTH CARE EDUCATION/TRAINING PROGRAM

## 2023-01-29 PROCEDURE — A9270 NON-COVERED ITEM OR SERVICE: HCPCS | Performed by: STUDENT IN AN ORGANIZED HEALTH CARE EDUCATION/TRAINING PROGRAM

## 2023-01-29 PROCEDURE — 770006 HCHG ROOM/CARE - MED/SURG/GYN SEMI*

## 2023-01-29 RX ADMIN — AMPICILLIN AND SULBACTAM 3 G: 1; 2 INJECTION, POWDER, FOR SOLUTION INTRAMUSCULAR; INTRAVENOUS at 23:54

## 2023-01-29 RX ADMIN — SENNOSIDES AND DOCUSATE SODIUM 2 TABLET: 50; 8.6 TABLET ORAL at 17:44

## 2023-01-29 RX ADMIN — ENOXAPARIN SODIUM 40 MG: 40 INJECTION SUBCUTANEOUS at 17:45

## 2023-01-29 RX ADMIN — INSULIN HUMAN 2 UNITS: 100 INJECTION, SOLUTION PARENTERAL at 20:44

## 2023-01-29 RX ADMIN — IOHEXOL 100 ML: 350 INJECTION, SOLUTION INTRAVENOUS at 06:30

## 2023-01-29 RX ADMIN — AMPICILLIN AND SULBACTAM 3 G: 1; 2 INJECTION, POWDER, FOR SOLUTION INTRAMUSCULAR; INTRAVENOUS at 17:45

## 2023-01-29 RX ADMIN — INSULIN HUMAN 2 UNITS: 100 INJECTION, SOLUTION PARENTERAL at 12:08

## 2023-01-29 RX ADMIN — SENNOSIDES AND DOCUSATE SODIUM 2 TABLET: 50; 8.6 TABLET ORAL at 05:00

## 2023-01-29 RX ADMIN — OXYCODONE HYDROCHLORIDE 5 MG: 5 TABLET ORAL at 20:37

## 2023-01-29 RX ADMIN — AMPICILLIN AND SULBACTAM 3 G: 1; 2 INJECTION, POWDER, FOR SOLUTION INTRAMUSCULAR; INTRAVENOUS at 11:49

## 2023-01-29 RX ADMIN — AMPICILLIN AND SULBACTAM 3 G: 1; 2 INJECTION, POWDER, FOR SOLUTION INTRAMUSCULAR; INTRAVENOUS at 01:23

## 2023-01-29 RX ADMIN — INSULIN HUMAN 1 UNITS: 100 INJECTION, SOLUTION PARENTERAL at 07:38

## 2023-01-29 RX ADMIN — AMPICILLIN AND SULBACTAM 3 G: 1; 2 INJECTION, POWDER, FOR SOLUTION INTRAMUSCULAR; INTRAVENOUS at 05:02

## 2023-01-29 RX ADMIN — INSULIN HUMAN 2 UNITS: 100 INJECTION, SOLUTION PARENTERAL at 17:41

## 2023-01-29 ASSESSMENT — ENCOUNTER SYMPTOMS
DIARRHEA: 0
NAUSEA: 0
WHEEZING: 0
ABDOMINAL PAIN: 1
EYE PAIN: 0
HEADACHES: 0
SHORTNESS OF BREATH: 0
DEPRESSION: 0
VOMITING: 0
ROS GI COMMENTS: POOR APPETITE
FEVER: 0
CHILLS: 0

## 2023-01-29 ASSESSMENT — PATIENT HEALTH QUESTIONNAIRE - PHQ9
SUM OF ALL RESPONSES TO PHQ9 QUESTIONS 1 AND 2: 0
2. FEELING DOWN, DEPRESSED, IRRITABLE, OR HOPELESS: NOT AT ALL
1. LITTLE INTEREST OR PLEASURE IN DOING THINGS: NOT AT ALL

## 2023-01-29 ASSESSMENT — PAIN SCALES - WONG BAKER: WONGBAKER_NUMERICALRESPONSE: HURTS A LITTLE MORE

## 2023-01-29 ASSESSMENT — PAIN SCALES - PAIN ASSESSMENT IN ADVANCED DEMENTIA (PAINAD)
BREATHING: NORMAL
CONSOLABILITY: NO NEED TO CONSOLE
TOTALSCORE: 0
FACIALEXPRESSION: SMILING OR INEXPRESSIVE
BODYLANGUAGE: RELAXED

## 2023-01-29 ASSESSMENT — PAIN DESCRIPTION - PAIN TYPE: TYPE: ACUTE PAIN

## 2023-01-29 NOTE — PROGRESS NOTES
Infectious Disease Progress Note    Author: Kannan Dee M.D. Date & Time of service: 2023  11:48 AM    Chief Complaint:  liver abscess +blood cxs     Interval History:  57 y.o.  admitted as a transfer from [facility] on 2023 for abd pain and fever    AF WBC 9.7 feels better-tolerating abx-watching a documentary   patient is afebrile, white count is 8.3, tolerating Unasyn.    Labs Reviewed and Medications Reviewed.    Review of Systems:  Review of Systems   Constitutional:  Negative for fever.   Gastrointestinal:  Positive for abdominal pain. Negative for diarrhea, nausea and vomiting.   All other systems reviewed and are negative.    Hemodynamics:  Temp (24hrs), Av.4 °C (97.5 °F), Min:36.3 °C (97.3 °F), Max:36.6 °C (97.9 °F)  Temperature: 36.3 °C (97.3 °F)  Pulse  Av.4  Min: 65  Max: 97   Blood Pressure: 119/70       Physical Exam:  Physical Exam  Vitals and nursing note reviewed.   Constitutional:       General: He is not in acute distress.     Appearance: He is not ill-appearing, toxic-appearing or diaphoretic.   Eyes:      General: No scleral icterus.     Extraocular Movements: Extraocular movements intact.      Pupils: Pupils are equal, round, and reactive to light.   Cardiovascular:      Rate and Rhythm: Normal rate.   Pulmonary:      Effort: Pulmonary effort is normal. No respiratory distress.      Breath sounds: No stridor.   Abdominal:      General: There is no distension.      Palpations: Abdomen is soft.      Tenderness: There is abdominal tenderness.      Comments: Small amount of serosanguineous output in bulb   Skin:     Coloration: Skin is not jaundiced.   Neurological:      General: No focal deficit present.      Mental Status: He is alert and oriented to person, place, and time.   Psychiatric:         Mood and Affect: Mood normal.         Behavior: Behavior normal.       Meds:    Current Facility-Administered Medications:     acetaminophen    oxyCODONE  immediate-release    insulin GLARGINE    ampicillin-sulbactam (UNASYN) IV    HYDROmorphone    enoxaparin (LOVENOX) injection    senna-docusate **AND** polyethylene glycol/lytes **AND** magnesium hydroxide **AND** bisacodyl    ondansetron    ondansetron    promethazine    promethazine    prochlorperazine    insulin regular **AND** POC blood glucose manual result **AND** NOTIFY MD and PharmD **AND** Administer 20 grams of glucose (approximately 8 ounces of fruit juice) every 15 minutes PRN FSBG less than 70 mg/dL **AND** dextrose bolus    Labs:  Recent Labs     01/27/23  0202 01/28/23  0555 01/29/23  0640   WBC 8.9 9.7 8.3   RBC 4.47* 5.08 4.74   HEMOGLOBIN 13.3* 15.1 14.1   HEMATOCRIT 41.2* 46.9 43.9   MCV 92.2 92.3 92.6   MCH 29.8 29.7 29.7   RDW 42.4 43.0 43.1   PLATELETCT 418 564* 483*   MPV 9.4 9.6 9.0       Recent Labs     01/27/23  0202 01/28/23  0555 01/29/23  0640   SODIUM 136 137 133*   POTASSIUM 4.3 4.7 4.4   CHLORIDE 102 98 98   CO2 25 26 27   GLUCOSE 221* 218* 182*   BUN 11 12 10       Recent Labs     01/27/23  0202 01/28/23  0555 01/29/23  0640   ALBUMIN 2.6* 3.5 3.3   TBILIRUBIN 0.3 0.3 0.3   ALKPHOSPHAT 96 105* 92   TOTPROTEIN 6.5 7.6 6.8   ALTSGPT 76* 72* 60*   ASTSGOT 30 32 29   CREATININE 0.69 0.76 0.73         Imaging:  CT-DRAIN-PERITONEAL    Result Date: 1/23/2023 1/23/2023 4:50 PM HISTORY/REASON FOR EXAM:  Drainage of 6.5 cm hypodense liver lesion on outside imaging, suspected liver abscess. Abdominal pain, fatigue, sepsis with fever 101, leukocytosis with WBC count 14 K at outside hospital. Remote history of diverticulitis in the 2000's. TECHNIQUE/EXAM DESCRIPTION: Liver abscess drainage with CT guidance. Low dose optimization technique was utilized for this CT exam including automated exposure control and adjustment of the mA and/or kV according to patient size. ALL ELEMENTS OF MAXIMAL STERILE BARRIER TECHNIQUE WERE FOLLOWED. SEDATION TIME: 30 minutes. Moderate sedation was administered with  fentanyl and Versed with continuous patient monitoring by the interventional radiology nurse. PROCEDURE: Informed consent was obtained. Localizing CT images were obtained with the patient in supine position. The skin was prepped with ChloraPrep and draped in a sterile fashion. Moderate sedation was provided. Pulse oximetry and continuous cardiac monitoring by the nurse was performed throughout the exam. Intraservice time was 30 minutes. Following local anesthesia with 1% Lidocaine, a 17 G guiding needle was placed and needle path across the right lobe of the liver to the right lobe segment 7 posteriorly confirmed with CT. An Amplatz guidewire was placed and following serial tract dilatation, a 10 [place] pigtail locking catheter was placed. A specimen was collected and submitted for culture and sensitivity and Gram stain. Total of 65 mL beige pus which was blood tinged was drained. The catheter was secured to the skin and connected to suction bulb drainage. Final CT images were obtained documenting catheter position. The patient tolerated the procedure well with no evidence of complication. COMPARISON: Outside films CT of the abdomen and pelvis with contrast 1/22/2023 FINDINGS: The final CT images show satisfactory catheter position within the target collection in the posterior right lobe at segment 7.     1.  CT GUIDED LIVER ABSCESS DRAINAGE WITH PLACEMENT OF A 10 [place] LOCKING LOOP PIGTAIL CATHETER. EVACUATION 65 ML BEIGE BLOOD-TINGED PUS. 2.  THE CURRENT PLAN IS TO MONITOR DRAINAGE OUTPUT AND OBTAIN A FOLLOWUP CT SCAN IN 5-7 DAYS IF CLINICALLY INDICATED. ORDERS WERE WRITTEN FOR TWICE DAILY IRRIGATION OF THE PIGTAIL CATHETER WITH 5 ML STERILE SALINE.    US-RUQ    Result Date: 1/26/2023 1/26/2023 7:12 AM HISTORY/REASON FOR EXAM:  Abnormal Labs Abdominal pain TECHNIQUE/EXAM DESCRIPTION AND NUMBER OF VIEWS:  Real-time sonography of the liver and biliary tree. COMPARISON: CT abdomen and pelvis from outside facility  "1/22/2023 FINDINGS: The liver is normal in contour with increased echogenicity. There is heterogeneous collection in the posterior right lobe, measuring about 6.4 x 5.8 x 6.2 cm presumably related to a hepatic abscess with drainage catheter in place. There are echogenic foci presumably related to air.. The liver measures 20.48 cm. The gallbladder is normal. There is no evidence of cholelithiasis. The gallbladder wall thickness measures 2.60 mm. There is no pericholecystic fluid. The common duct measures 4.80 mm. The visualized pancreas is unremarkable. The visualized aorta is normal in caliber. Intrahepatic IVC is patent. The portal vein is patent with hepatopetal flow. The MPV measures 1.21 cm. The right kidney measures 13.32 cm. There is no ascites.     1.  Hepatomegaly and hepatic steatosis. 2.  Posterior right hepatic collection/abscess measures about 6.4 x 5.8 x 6.2 cm.      Micro:  Results       Procedure Component Value Units Date/Time    BLOOD CULTURE [[id number]] Collected: 01/28/23 0829    Order Status: Completed Specimen: Blood from Peripheral Updated: 01/29/23 0635     Significant Indicator NEG     Source BLD     Site PERIPHERAL     Culture Result No Growth  Note: Blood cultures are incubated for 5 days and  are monitored continuously.Positive blood cultures  are called to the RN and reported as soon as  they are identified.      Narrative:      Per Hospital Policy: Only change Specimen Src: to \"Line\" if  specified by physician order.  Left AC    BLOOD CULTURE [[id number]] Collected: 01/28/23 0836    Order Status: Completed Specimen: Blood from Peripheral Updated: 01/29/23 0635     Significant Indicator NEG     Source BLD     Site PERIPHERAL     Culture Result No Growth  Note: Blood cultures are incubated for 5 days and  are monitored continuously.Positive blood cultures  are called to the RN and reported as soon as  they are identified.      Narrative:      Per Hospital Policy: Only change Specimen Src: " "to \"Line\" if  specified by physician order.  Right AC    BLOOD CULTURE [[id number]] Collected: 01/22/23 2047    Order Status: Completed Specimen: Blood from Peripheral Updated: 01/27/23 2300     Significant Indicator NEG     Source BLD     Site PERIPHERAL     Culture Result No growth after 5 days of incubation.    Narrative:      Per Hospital Policy: Only change Specimen Src: to \"Line\" if  specified by physician order.  Right Hand    BLOOD CULTURE [[id number]]  (Abnormal) Collected: 01/22/23 2047    Order Status: Completed Specimen: Blood from Peripheral Updated: 01/26/23 1253     Significant Indicator POS     Source BLD     Site PERIPHERAL     Culture Result Growth detected by Bactec instrument. 01/25/2023  05:59      Viridans Streptococcus  Possible Contaminant  Isolated from one set only, please correlate with clinical  condition. Contact the Microbiology department within 48 hr  if identification and susceptibility are needed.      Narrative:      CALL  [place]  61 tel. [phone],  CALLED  61 tel. [phone] 01/25/2023, 06:03, RB PERF. RESULTS CALLED  TO:[phone] RN  Per Hospital Policy: Only change Specimen Src: to \"Line\" if  specified by physician order.  Left AC    CULTURE WOUND W/ GRAM STAIN [[id number]]  (Abnormal) Collected: 01/23/23 1730    Order Status: Completed Specimen: Wound Updated: 01/26/23 0835     Significant Indicator POS     Source WND     Site liver abscess     Culture Result -     Gram Stain Result Many WBCs.  Many Gram positive cocci.       Culture Result Streptococcus intermedius  Heavy growth      GRAM STAIN [[id number]] Collected: 01/23/23 1730    Order Status: Completed Specimen: Wound Updated: 01/23/23 2300     Significant Indicator .     Source WND     Site liver abscess     Gram Stain Result Many WBCs.  Many Gram positive cocci.      FLUID CULTURE W/GRAM STAIN [[id number]]     Order Status: No result Specimen: Other Body Fluid     BLOOD CULTURE [[id number]]     Order Status: Canceled Specimen: " Blood from Peripheral     BLOOD CULTURE [[id number]]     Order Status: Canceled Specimen: Blood from Peripheral             Assessment:  Strep intermedius bacteremia  Liver abscess  Uncontrolled type 2 diabetes      PLAN:   S/p drain liver abscess 1/23  -Culture Strep intermedius  -Follow repeat blood cultures x2 from 1/28, pending  TTE with no obvious valvular vegetations  Continue IV Unasyn while inpatient  Repeat CT scan with improvement in size of abscess, largest dimension 3 cm  Anticipate being able to use p.o. Augmentin on discharge even for bacteremia as anticipating a prolonged course of multiple weeks and initial positivity appears to be transient bacteremia  Drain management per IR  Remains at risk for recurrent infections given significant underlying uncontrolled diabetes    Disposition:   Not cleared for discharge till at least 48 hours of negative blood cultures and IR drain management is established  Anticipate at least 4 weeks of oral antibiotics and discharged with p.o. Augmentin 875 mg twice daily through 2/25/2023  Repeat CBC with differential, CMP in approximately 4 weeks as well as repeat CT scan (ordered both)  Need for PICC line: Likely no     Plan of care discussed with CHRIS Morse M.D..

## 2023-01-29 NOTE — PROGRESS NOTES
[facility] Medicine Daily Progress Note    Date of Service  1/29/2023    Chief Complaint  Tejas Yusuf is a 57 y.o. male 1/22/2023  transferred to our facility for higher level of care for hepatic abscess.     Hospital Course  Tejas Yusuf is a 57 y.o. male admitted 1/22/2023 with no significant past medical history found to have a cait-hepatic abscess and transferred to our facility for higher level of care. Abscess-pain started 1 week ago, poor appetite, progressively worse. No prior issues. No foreign travel or odd food ingestions.     RUQ U/S Hepatomegaly and hepatic steatosis; Posterior right hepatic collection/abscess measures about 6.4 x 5.8 x 6.2 cm    Interval Problem Update  Reported abdominal pain, tolerable  Denies nausea vomiting.  Had a bowel movement today    S/p drain placement by IR 1/23, culture positive for Streptococcus intermedius,   b/c 1/22 positive for Viridans Streptococcus    repeated abdominal CT  1/29 showed a decrease size of hepatic abscess  TTE unremarkable with EF of 55%, negative for vegetation    I discussed with ID, recommended at least 4 weeks of Augmentin until 2/25/2023  Repeat a CBC, CMP and CT scan in 4 weeks (ordered by ID)    AST ALT 86/103- 29/60,  BG still high 180-250, increase lantus to 20 units,       I have discussed this patient's plan of care and discharge plan at IDT rounds today with Case Management, Nursing, Nursing leadership, and other members of the IDT team.    Consultants/Specialty  IR, ID    Code Status  Full Code    Disposition  Patient is not medically cleared for discharge.   Anticipate discharge to to home with close outpatient follow-up.  I have placed the appropriate orders for post-discharge needs.    Review of Systems  Review of Systems   Constitutional:  Positive for malaise/fatigue. Negative for chills and fever.   HENT:  Negative for ear pain.    Eyes:  Negative for pain.   Respiratory:  Negative for shortness of breath and wheezing.     Cardiovascular:  Negative for chest pain and leg swelling.   Gastrointestinal:  Positive for abdominal pain. Negative for vomiting.        Poor appetite   Genitourinary:  Negative for dysuria, frequency and urgency.   Neurological:  Negative for headaches.   Psychiatric/Behavioral:  Negative for depression.    All other systems reviewed and are negative.     Physical Exam  Temp:  [36.3 °C (97.3 °F)-36.6 °C (97.9 °F)] 36.4 °C (97.6 °F)  Pulse:  [73-85] 85  Resp:  [18] 18  BP: (118-134)/(70-77) 128/77  SpO2:  [94 %-97 %] 94 %    Physical Exam  Vitals and nursing note reviewed.   Constitutional:       General: He is not in acute distress.     Appearance: He is well-developed.      Comments: Comfortable appearing     HENT:      Head: Normocephalic and atraumatic.      Mouth/Throat:      Pharynx: No oropharyngeal exudate.   Eyes:      General: No scleral icterus.     Pupils: Pupils are equal, round, and reactive to light.   Neck:      Thyroid: No thyromegaly.   Cardiovascular:      Rate and Rhythm: Normal rate and regular rhythm.      Heart sounds: Normal heart sounds. No murmur heard.  Pulmonary:      Effort: Pulmonary effort is normal. No respiratory distress.      Breath sounds: Normal breath sounds. No wheezing.   Abdominal:      General: Bowel sounds are normal. There is no distension.      Palpations: Abdomen is soft.      Tenderness: There is abdominal tenderness (mild RUQ).   Musculoskeletal:         General: No tenderness. Normal range of motion.      Cervical back: Normal range of motion and neck supple.   Skin:     General: Skin is warm and dry.      Findings: No rash.   Neurological:      Mental Status: He is alert and oriented to person, place, and time.      Cranial Nerves: No cranial nerve deficit.       Fluids    Intake/Output Summary (Last 24 hours) at 1/29/2023 1512  Last data filed at 1/29/2023 1400  Gross per 24 hour   Intake 570 ml   Output --   Net 570 ml       Laboratory  Recent Labs      01/27/23  0202 01/28/23  0555 01/29/23  0640   WBC 8.9 9.7 8.3   RBC 4.47* 5.08 4.74   HEMOGLOBIN 13.3* 15.1 14.1   HEMATOCRIT 41.2* 46.9 43.9   MCV 92.2 92.3 92.6   MCH 29.8 29.7 29.7   MCHC 32.3* 32.2* 32.1*   RDW 42.4 43.0 43.1   PLATELETCT 418 564* 483*   MPV 9.4 9.6 9.0     Recent Labs     01/27/23  0202 01/28/23  0555 01/29/23  0640   SODIUM 136 137 133*   POTASSIUM 4.3 4.7 4.4   CHLORIDE 102 98 98   CO2 25 26 27   GLUCOSE 221* 218* 182*   BUN 11 12 10   CREATININE 0.69 0.76 0.73   CALCIUM 8.7 9.4 9.0                       Imaging  CT-ABDOMEN-PELVIS WITH   Final Result      1.  Interval placement of a percutaneous drain into a right lobe hepatic abscess. Abscess has decreased in size with air/fluid component remaining in that area measuring approximately 3 x 1.5 cm in size.      2.  Fatty liver.      3.  Minimal right lung base atelectasis.      4.  Diverticulosis.      5.  Fat-containing umbilical and left inguinal hernias.      EC-ECHOCARDIOGRAM COMPLETE W/O CONT   Final Result      US-RUQ   Final Result      1.  Hepatomegaly and hepatic steatosis.   2.  Posterior right hepatic collection/abscess measures about 6.4 x 5.8 x 6.2 cm.      CT-DRAIN-PERITONEAL   Final Result      1.  CT GUIDED LIVER ABSCESS DRAINAGE WITH PLACEMENT OF A 10 [place] LOCKING LOOP PIGTAIL CATHETER. EVACUATION 65 ML BEIGE BLOOD-TINGED PUS.   2.  THE CURRENT PLAN IS TO MONITOR DRAINAGE OUTPUT AND OBTAIN A FOLLOWUP CT SCAN IN 5-7 DAYS IF CLINICALLY INDICATED. ORDERS WERE WRITTEN FOR TWICE DAILY IRRIGATION OF THE PIGTAIL CATHETER WITH 5 ML STERILE SALINE.      OUTSIDE IMAGES-CT CHEST   Final Result      OUTSIDE IMAGES-CT ABDOMEN /PELVIS   Final Result           Assessment/Plan  * Hepatic abscess- (present on admission)  Assessment & Plan  Nargis-hepatic in nature  Unclear cause, blood cultures are ngtd,      Consider E histolytica ag assay, though pre-test probability is low    S/p drain placement by IR 1/23  S/p drain placement by IR 1/23,  culture positive for Streptococcus intermedius, b/c positive for strep    repeated abdominal CT  1/29 showed a decrease size of hepatic abscess  TTE unremarkable with EF of 55%, negative for vegetation    I discussed with ID, recommended at least 4 weeks of Augmentin until 2/25/2023  Repeat a CBC, CMP and CT scan in 4 weeks (ordered by ID)    New onset type 2 diabetes mellitus (HCC)- (present on admission)  Assessment & Plan  BG high at admission likely   A1c 11.6    Started on SSI  BG still high, added Lantus 10 units  increase lantus to 15 units    1/28 BG still high 180-250, increase lantus to 20 units,   Continue SSI,   Hypoglycemia protocol    Continue to monitor    Bacteremia  Assessment & Plan  b/c 1/2 positive for Viridans Streptococcus  Source of hepatic abscess    Repeated blood culture 1/28    repeated abdominal CT  1/29 showed a decrease size of hepatic abscess  TTE unremarkable with EF of 55%, negative for vegetation    I discussed with ID, recommended at least 4 weeks of Augmentin until 2/25/2023  Repeat a CBC, CMP and CT scan in 4 weeks (ordered by ID)    Hypophosphatasia  Assessment & Plan  Phos 2.3  replaced  Continue to monitor    Elevated liver enzymes  Assessment & Plan  AST ALT 68/61-> 86/103, was normal at admission  RUQ U/S Hepatomegaly and hepatic steatosis; Posterior right hepatic collection/abscess measures about 6.4 x 5.8 x 6.2 cm.    Likely due to hepatic abscess   continue to monitor    Now Trending down      Abnormal CT of the chest- (present on admission)  Assessment & Plan  Outpatient follow up    Positive D dimer- (present on admission)  Assessment & Plan  Unclear etiology    Hyponatremia- (present on admission)  Assessment & Plan  Mild  Continue to monitor    Sodium 136 today    Sepsis (HCC)- (present on admission)  Assessment & Plan  Resolving  2/2 cait-hepatic abscess  See elsewhere         VTE prophylaxis: SCDs/TEDs and enoxaparin ppx    I have performed a physical exam and  reviewed and updated ROS and Plan today (1/29/2023). In review of yesterday's note (1/28/2023), there are no changes except as documented above.

## 2023-01-29 NOTE — PROGRESS NOTES
Pt is alert and oriented x4, on room air. PIV dressing in place. Blood glucose monitored with insulin coverage. PRN pain medication given. Able to ambulate t the bathroom. Encouraged to use call light when getting up. All needs attended.

## 2023-01-29 NOTE — CARE PLAN
The patient is Stable - Low risk of patient condition declining or worsening    Shift Goals  Clinical Goals: pain management  Progress made toward(s) clinical / shift goals:  Pt pain 5/10 is managed by prn pain medication with good effects.      Problem: Pain - Standard  Goal: Alleviation of pain or a reduction in pain to the patient’s comfort goal  Outcome: Progressing

## 2023-01-30 ENCOUNTER — PHARMACY VISIT (OUTPATIENT)
Dept: PHARMACY | Facility: MEDICAL CENTER | Age: 58
End: 2023-01-30
Payer: COMMERCIAL

## 2023-01-30 VITALS
TEMPERATURE: 97.8 F | HEIGHT: 73 IN | OXYGEN SATURATION: 94 % | DIASTOLIC BLOOD PRESSURE: 80 MMHG | BODY MASS INDEX: 39.8 KG/M2 | HEART RATE: 75 BPM | WEIGHT: 300.27 LBS | SYSTOLIC BLOOD PRESSURE: 131 MMHG | RESPIRATION RATE: 16 BRPM

## 2023-01-30 DIAGNOSIS — K75.0 HEPATIC ABSCESS: ICD-10-CM

## 2023-01-30 DIAGNOSIS — R78.81 BACTEREMIA: ICD-10-CM

## 2023-01-30 LAB
ALBUMIN SERPL BCP-MCNC: 3.1 G/DL (ref 3.2–4.9)
ALBUMIN/GLOB SERPL: 0.9 G/DL
ALP SERPL-CCNC: 82 U/L (ref 30–99)
ALT SERPL-CCNC: 52 U/L (ref 2–50)
ANION GAP SERPL CALC-SCNC: 9 MMOL/L (ref 7–16)
AST SERPL-CCNC: 22 U/L (ref 12–45)
BILIRUB SERPL-MCNC: 0.2 MG/DL (ref 0.1–1.5)
BUN SERPL-MCNC: 13 MG/DL (ref 8–22)
CALCIUM ALBUM COR SERPL-MCNC: 9.4 MG/DL (ref 8.5–10.5)
CALCIUM SERPL-MCNC: 8.7 MG/DL (ref 8.5–10.5)
CHLORIDE SERPL-SCNC: 101 MMOL/L (ref 96–112)
CO2 SERPL-SCNC: 26 MMOL/L (ref 20–33)
CREAT SERPL-MCNC: 0.79 MG/DL (ref 0.5–1.4)
GFR SERPLBLD CREATININE-BSD FMLA CKD-EPI: 102 ML/MIN/1.73 M 2
GLOBULIN SER CALC-MCNC: 3.5 G/DL (ref 1.9–3.5)
GLUCOSE BLD STRIP.AUTO-MCNC: 167 MG/DL (ref 65–99)
GLUCOSE BLD STRIP.AUTO-MCNC: 202 MG/DL (ref 65–99)
GLUCOSE SERPL-MCNC: 219 MG/DL (ref 65–99)
POTASSIUM SERPL-SCNC: 4.6 MMOL/L (ref 3.6–5.5)
PROT SERPL-MCNC: 6.6 G/DL (ref 6–8.2)
SODIUM SERPL-SCNC: 136 MMOL/L (ref 135–145)

## 2023-01-30 PROCEDURE — A9270 NON-COVERED ITEM OR SERVICE: HCPCS

## 2023-01-30 PROCEDURE — A9270 NON-COVERED ITEM OR SERVICE: HCPCS | Performed by: STUDENT IN AN ORGANIZED HEALTH CARE EDUCATION/TRAINING PROGRAM

## 2023-01-30 PROCEDURE — 700111 HCHG RX REV CODE 636 W/ 250 OVERRIDE (IP): Performed by: INTERNAL MEDICINE

## 2023-01-30 PROCEDURE — 700102 HCHG RX REV CODE 250 W/ 637 OVERRIDE(OP): Performed by: STUDENT IN AN ORGANIZED HEALTH CARE EDUCATION/TRAINING PROGRAM

## 2023-01-30 PROCEDURE — RXMED WILLOW AMBULATORY MEDICATION CHARGE: Performed by: STUDENT IN AN ORGANIZED HEALTH CARE EDUCATION/TRAINING PROGRAM

## 2023-01-30 PROCEDURE — 80053 COMPREHEN METABOLIC PANEL: CPT

## 2023-01-30 PROCEDURE — 700105 HCHG RX REV CODE 258: Performed by: INTERNAL MEDICINE

## 2023-01-30 PROCEDURE — 82962 GLUCOSE BLOOD TEST: CPT | Mod: 91

## 2023-01-30 PROCEDURE — 700102 HCHG RX REV CODE 250 W/ 637 OVERRIDE(OP)

## 2023-01-30 PROCEDURE — 99239 HOSP IP/OBS DSCHRG MGMT >30: CPT | Performed by: STUDENT IN AN ORGANIZED HEALTH CARE EDUCATION/TRAINING PROGRAM

## 2023-01-30 PROCEDURE — 99232 SBSQ HOSP IP/OBS MODERATE 35: CPT | Performed by: INTERNAL MEDICINE

## 2023-01-30 RX ORDER — INSULIN GLARGINE 100 [IU]/ML
20 INJECTION, SOLUTION SUBCUTANEOUS EVERY EVENING
Qty: 10 ML | Refills: 0 | Status: ACTIVE | OUTPATIENT
Start: 2023-01-30 | End: 2023-03-01

## 2023-01-30 RX ORDER — OXYCODONE HYDROCHLORIDE 5 MG/1
5 TABLET ORAL EVERY 8 HOURS PRN
Qty: 9 TABLET | Refills: 0 | Status: SHIPPED | OUTPATIENT
Start: 2023-01-30 | End: 2023-02-02

## 2023-01-30 RX ORDER — GLUCOSAMINE HCL/CHONDROITIN SU 500-400 MG
CAPSULE ORAL
Qty: 100 EACH | Refills: 0 | Status: SHIPPED | OUTPATIENT
Start: 2023-01-30

## 2023-01-30 RX ORDER — AMOXICILLIN AND CLAVULANATE POTASSIUM 875; 125 MG/1; MG/1
1 TABLET, FILM COATED ORAL 2 TIMES DAILY
Qty: 54 TABLET | Refills: 0 | Status: ACTIVE | OUTPATIENT
Start: 2023-01-30 | End: 2023-02-22 | Stop reason: ALTCHOICE

## 2023-01-30 RX ORDER — LANCETS 30 GAUGE
EACH MISCELLANEOUS
Qty: 100 EACH | Refills: 0 | Status: SHIPPED | OUTPATIENT
Start: 2023-01-30

## 2023-01-30 RX ORDER — AMOXICILLIN AND CLAVULANATE POTASSIUM 875; 125 MG/1; MG/1
1 TABLET, FILM COATED ORAL 2 TIMES DAILY
Qty: 54 TABLET | Refills: 0 | Status: ACTIVE | OUTPATIENT
Start: 2023-01-30 | End: 2023-01-30 | Stop reason: SDUPTHER

## 2023-01-30 RX ORDER — BLOOD-GLUCOSE METER
KIT MISCELLANEOUS
Qty: 1 KIT | Refills: 0 | Status: SHIPPED | OUTPATIENT
Start: 2023-01-30

## 2023-01-30 RX ADMIN — NICOTINE POLACRILEX 2 MG: 2 GUM, CHEWING BUCCAL at 11:44

## 2023-01-30 RX ADMIN — AMPICILLIN AND SULBACTAM 3 G: 1; 2 INJECTION, POWDER, FOR SOLUTION INTRAMUSCULAR; INTRAVENOUS at 04:46

## 2023-01-30 RX ADMIN — INSULIN HUMAN 2 UNITS: 100 INJECTION, SOLUTION PARENTERAL at 12:20

## 2023-01-30 RX ADMIN — OXYCODONE HYDROCHLORIDE 5 MG: 5 TABLET ORAL at 05:28

## 2023-01-30 RX ADMIN — INSULIN HUMAN 1 UNITS: 100 INJECTION, SOLUTION PARENTERAL at 09:07

## 2023-01-30 ASSESSMENT — ENCOUNTER SYMPTOMS
VOMITING: 0
NAUSEA: 0
FEVER: 0
ABDOMINAL PAIN: 1
DIARRHEA: 0

## 2023-01-30 NOTE — PROGRESS NOTES
Patient to be discharged today - patient aware and agreeable to plan. D/c instructions reviewed with patient - ?'s/concerns answered. Meds to beds delivered to this RN, meds verified and handed to patient.  1 x iv removed    Tried to get in contact with Eufemia Morse MD to ask about SANDER drain removal, no response and patient had family waiting. Called the RN to verify that patient was to go home with drain and yes patient is to be discharged with drain.   Called Maria Esther in IR and verified that the patient would need to make an appointment with IR for drain removal. Patient will follow up with Infectious Disease to determine SANDER removal time. Patient agreeable to plan.

## 2023-01-30 NOTE — DISCHARGE INSTRUCTIONS
- Follow up with primary care physician in 1 week for diabetes management  - Follow up with infectious disease as instructed, needs to repeat a CT and recheck CBC and CMP in 4 weeks  - Please take the medications as instructed    - Go to the local Emergency Department if you have any worsening condition.     Abscess  Care After  An abscess (also called a boil or furuncle) is an infected area that contains a collection of pus. Signs and symptoms of an abscess include pain, tenderness, redness, or hardness, or you may feel a moveable soft area under your skin. An abscess can occur anywhere in the body. The infection may spread to surrounding tissues causing cellulitis. A cut (incision) by the surgeon was made over your abscess and the pus was drained out. Gauze may have been packed into the space to provide a drain that will allow the cavity to heal from the inside outwards. The boil may be painful for 5 to 7 days. Most people with a boil do not have high fevers. Your abscess, if seen early, may not have localized, and may not have been lanced. If not, another appointment may be required for this if it does not get better on its own or with medications.  HOME CARE INSTRUCTIONS   Only take over-the-counter or prescription medicines for pain, discomfort, or fever as directed by your caregiver.  When you bathe, soak and then remove gauze or iodoform packs at least daily or as directed by your caregiver. You may then wash the wound gently with mild soapy water. Repack with gauze or do as your caregiver directs.  SEEK IMMEDIATE MEDICAL CARE IF:   You develop increased pain, swelling, redness, drainage, or bleeding in the wound site.  You develop signs of generalized infection including muscle aches, chills, fever, or a general ill feeling.  An oral temperature above 102° F (38.9° C) develops, not controlled by medication.  See your caregiver for a recheck if you develop any of the symptoms described above. If medications  (antibiotics) were prescribed, take them as directed.  Document Released: 07/06/2006 Document Revised: 03/11/2013 Document Reviewed: 03/02/2009  ExitCare® Patient Information ©2014 [employer].        Diabetes Basics    Diabetes (diabetes mellitus) is a long-term (chronic) disease. It occurs when the body does not properly use sugar (glucose) that is released from food after you eat.  Diabetes may be caused by one or both of these problems:  Your pancreas does not make enough of a hormone called insulin.  Your body does not react in a normal way to insulin that it makes.  Insulin lets sugars (glucose) go into cells in your body. This gives you energy. If you have diabetes, sugars cannot get into cells. This causes high blood sugar (hyperglycemia).  Follow these instructions at home:  How is diabetes treated?  You may need to take insulin or other diabetes medicines daily to keep your blood sugar in balance. Take your diabetes medicines every day as told by your doctor. List your diabetes medicines here:  Diabetes medicines  Name of medicine: ______________________________  Amount (dose): _______________ Time (a.m./p.m.): _______________ Notes: ___________________________________  Name of medicine: ______________________________  Amount (dose): _______________ Time (a.m./p.m.): _______________ Notes: ___________________________________  Name of medicine: ______________________________  Amount (dose): _______________ Time (a.m./p.m.): _______________ Notes: ___________________________________  If you use insulin, you will learn how to give yourself insulin by injection. You may need to adjust the amount based on the food that you eat. List the types of insulin you use here:  Insulin  Insulin type: ______________________________  Amount (dose): _______________ Time (a.m./p.m.): _______________ Notes: ___________________________________  Insulin type: ______________________________  Amount (dose): _______________ Time  (a.m./p.m.): _______________ Notes: ___________________________________  Insulin type: ______________________________  Amount (dose): _______________ Time (a.m./p.m.): _______________ Notes: ___________________________________  Insulin type: ______________________________  Amount (dose): _______________ Time (a.m./p.m.): _______________ Notes: ___________________________________  Insulin type: ______________________________  Amount (dose): _______________ Time (a.m./p.m.): _______________ Notes: ___________________________________  How do I manage my blood sugar?    Check your blood sugar levels using a blood glucose monitor as directed by your doctor.  Your doctor will set treatment goals for you. Generally, you should have these blood sugar levels:  Before meals (preprandial):  mg/dL (4.4-7.2 mmol/L).  After meals (postprandial): below 180 mg/dL (10 mmol/L).  A1c level: less than 7%.  Write down the times that you will check your blood sugar levels:  Blood sugar checks  Time: _______________ Notes: ___________________________________  Time: _______________ Notes: ___________________________________  Time: _______________ Notes: ___________________________________  Time: _______________ Notes: ___________________________________  Time: _______________ Notes: ___________________________________  Time: _______________ Notes: ___________________________________    What do I need to know about low blood sugar?  Low blood sugar is called hypoglycemia. This is when blood sugar is at or below 70 mg/dL (3.9 mmol/L). Symptoms may include:  Feeling:  Hungry.  Worried or nervous (anxious).  Sweaty and clammy.  Confused.  Dizzy.  Sleepy.  Sick to your stomach (nauseous).  Having:  A fast heartbeat.  A headache.  A change in your vision.  Tingling or no feeling (numbness) around the mouth, lips, or tongue.  Jerky movements that you cannot control (seizure).  Having trouble with:  Moving (coordination).  Sleeping.  Passing  out (fainting).  Getting upset easily (irritability).  Treating low blood sugar  To treat low blood sugar, eat or drink something sugary right away. If you can think clearly and swallow safely, follow the 15:15 rule:  Take 15 grams of a fast-acting carb (carbohydrate). Talk with your doctor about how much you should take.  Some fast-acting carbs are:  Sugar tablets (glucose pills). Take 3-4 glucose pills.  6-8 pieces of hard candy.  4-6 oz (120-150 mL) of fruit juice.  4-6 oz (120-150 mL) of regular (not diet) soda.  1 Tbsp (15 mL) honey or sugar.  Check your blood sugar 15 minutes after you take the carb.  If your blood sugar is still at or below 70 mg/dL (3.9 mmol/L), take 15 grams of a carb again.  If your blood sugar does not go above 70 mg/dL (3.9 mmol/L) after 3 tries, get help right away.  After your blood sugar goes back to normal, eat a meal or a snack within 1 hour.  Treating very low blood sugar  If your blood sugar is at or below 54 mg/dL (3 mmol/L), you have very low blood sugar (severe hypoglycemia). This is an emergency. Do not wait to see if the symptoms will go away. Get medical help right away. Call your local emergency services (911 in the [place] Do not drive yourself to the hospital.  Questions to ask your health care provider  Do I need to meet with a diabetes educator?  What equipment will I need to care for myself at home?  What diabetes medicines do I need? When should I take them?  How often do I need to check my blood sugar?  What number can I call if I have questions?  When is my next doctor's visit?  Where can I find a support group for people with diabetes?  Where to find more information  American Diabetes Association: www.diabetes.org  American Association of Diabetes Educators: www.diabeteseducator.org/patient-resources  Contact a doctor if:  Your blood sugar is at or above 240 mg/dL (13.3 mmol/L) for 2 days in a row.  You have been sick or have had a fever for 2 days or more, and you  are not getting better.  You have any of these problems for more than 6 hours:  You cannot eat or drink.  You feel sick to your stomach (nauseous).  You throw up (vomit).  You have watery poop (diarrhea).  Get help right away if:  Your blood sugar is lower than 54 mg/dL (3 mmol/L).  You get confused.  You have trouble:  Thinking clearly.  Breathing.  Summary  Diabetes (diabetes mellitus) is a long-term (chronic) disease. It occurs when the body does not properly use sugar (glucose) that is released from food after digestion.  Take insulin and diabetes medicines as told.  Check your blood sugar every day, as often as told.  Keep all follow-up visits as told by your doctor. This is important.  This information is not intended to replace advice given to you by your health care provider. Make sure you discuss any questions you have with your health care provider.  Document Released: 03/22/2019 Document Revised: 02/07/2020 Document Reviewed: 03/22/2019  [employer] Patient Education © 2020 [employer]

## 2023-01-30 NOTE — PROGRESS NOTES
Patient is alert and oriented x 4. Complaint of surgical site pain. Pain medication given, see MAR. SANDER drain with serosanguineous discharge about 10 ml drain at the start of the shift. IV site patent and saline locked. BS check and insulin coverage given. Call light and personal items within reached. Kept safe and monitored.     Problem: Occupational Therapy Goal  Goal: Occupational Therapy Goal  Goals with expiration date, 4/12:  Patient will increase functional independence with ADLs by performing:    Visual item location for 3/5 items on R.   Bed Mobility with min(a) and bed-rails as needed.  Supine to sit with CGA.  Stand pivot transfers with Contact Guard Assist.   Grooming while standing with min(a).  UB dressing with min(a) while seated EOB.   LB dressing with min(a) while seated EOB.  Patients family / caregiver will demonstrate independence and safety with assisting patient with self-care skills and functional mobility.   Patients family / caregiver will demonstrate independence with providing ROM and changes in bed positioning.   Patient and/or patients family will verbalize understanding of stroke prevention guidelines, personal risk factors and stroke warning signs via teachback method.   Outcome: Ongoing (interventions implemented as appropriate)  Pt with good participation this session. AVIS Correa 3/29/2017

## 2023-01-30 NOTE — PROGRESS NOTES
"Meds-to-Beds: Discharge prescription orders listed below delivered to patient's bedside. RN Perry notified. Patient counseled.      Current Outpatient Medications   Medication Sig Dispense Refill    oxyCODONE immediate-release (ROXICODONE) 5 MG Tab Take 1 Tablet by mouth every 8 hours as needed for Severe Pain for up to 3 days. 9 Tablet 0    insulin glargine 100 UNIT/ML SC SOLN Inject 20 Units under the skin every evening for 30 days. 10 mL 0    Blood Glucose Monitoring Suppl (BLOOD GLUCOSE SYSTEM VERONICA) Kit Test blood sugar as recommended by provider. 1 Kit 0    glucose blood strip Use one strip to test blood sugar three times daily before meals. 100 Strip 0    Alcohol Swabs Wipe site with prep pad prior to injection. 100 Each 0    Lancets Use one lancet to test blood sugar three times daily before meals. 100 Each 0    Insulin Syringe-Needle U-100 30G X 1/2\" 0.3 ML Misc Use one syringe to inject insulin three times daily. 100 Each 0    insulin regular (HUMULIN R) 100 Unit/mL Solution Inject 1-6 Units under the skin 4 Times a Day,Before Meals and at Bedtime for 30 days.70 - 150 mg/dL = 0 Units 151 - 200 mg/dL = 1 Units 201 - 250 mg/dL = 2 Units 251 - 300 mg/dL = 3 Units 301 - 350 mg/dL = 4 Units 351 - 400 mg/dL = 5 Units Over 400 mg/dL = 6 Units 10 mL 0      Teto Canales, Pharmacy Intern  "

## 2023-01-30 NOTE — DIETARY
Nutrition Services: T2DM Education Consult   Day 8 of admit.  Tejas Yusuf is a 57 y.o. male with admitting DX of Hepatic abscess [K75.0]     Pt received DM education on 1/25, multiple notes detailing education were left. Completing follow up consult at this time.       RD to monitor per department policy.

## 2023-01-30 NOTE — PROGRESS NOTES
Infectious Disease Progress Note    Author: Kannan Dee M.D. Date & Time of service: 2023  7:40 AM    Chief Complaint:  liver abscess +blood cxs     Interval History:  57 y.o.  admitted as a transfer from [facility] on 2023 for abd pain and fever    AF WBC 9.7 feels better-tolerating abx-watching a documentary   patient is afebrile, white count is 8.3, tolerating Unasyn.   patient is afebrile, no CBC this morning, tolerating Unasyn.  Repeat blood cultures from  no growth to date    Labs Reviewed and Medications Reviewed.    Review of Systems:  Review of Systems   Constitutional:  Negative for fever.   Gastrointestinal:  Positive for abdominal pain. Negative for diarrhea, nausea and vomiting.   All other systems reviewed and are negative.    Hemodynamics:  Temp (24hrs), Av.4 °C (97.5 °F), Min:36.1 °C (97 °F), Max:36.7 °C (98 °F)  Temperature: 36.6 °C (97.8 °F)  Pulse  Av.9  Min: 65  Max: 97   Blood Pressure: 131/80       Physical Exam:  Physical Exam  Vitals and nursing note reviewed.   Constitutional:       General: He is not in acute distress.     Appearance: He is not ill-appearing, toxic-appearing or diaphoretic.   Eyes:      General: No scleral icterus.     Extraocular Movements: Extraocular movements intact.      Pupils: Pupils are equal, round, and reactive to light.   Cardiovascular:      Rate and Rhythm: Normal rate.   Pulmonary:      Effort: Pulmonary effort is normal. No respiratory distress.      Breath sounds: No stridor.   Abdominal:      General: There is no distension.      Palpations: Abdomen is soft.      Tenderness: There is abdominal tenderness.      Comments: Small amount of serosanguineous output in bulb   Skin:     Coloration: Skin is not jaundiced.   Neurological:      General: No focal deficit present.      Mental Status: He is alert and oriented to person, place, and time.   Psychiatric:         Mood and Affect: Mood normal.         Behavior:  Behavior normal.       Meds:    Current Facility-Administered Medications:     acetaminophen    oxyCODONE immediate-release    insulin GLARGINE    ampicillin-sulbactam (UNASYN) IV    HYDROmorphone    enoxaparin (LOVENOX) injection    senna-docusate **AND** polyethylene glycol/lytes **AND** magnesium hydroxide **AND** bisacodyl    ondansetron    ondansetron    promethazine    promethazine    prochlorperazine    insulin regular **AND** POC blood glucose manual result **AND** NOTIFY MD and PharmD **AND** Administer 20 grams of glucose (approximately 8 ounces of fruit juice) every 15 minutes PRN FSBG less than 70 mg/dL **AND** dextrose bolus    Labs:  Recent Labs     01/28/23  0555 01/29/23  0640   WBC 9.7 8.3   RBC 5.08 4.74   HEMOGLOBIN 15.1 14.1   HEMATOCRIT 46.9 43.9   MCV 92.3 92.6   MCH 29.7 29.7   RDW 43.0 43.1   PLATELETCT 564* 483*   MPV 9.6 9.0       Recent Labs     01/28/23  0555 01/29/23  0640 01/30/23  0051   SODIUM 137 133* 136   POTASSIUM 4.7 4.4 4.6   CHLORIDE 98 98 101   CO2 26 27 26   GLUCOSE 218* 182* 219*   BUN 12 10 13       Recent Labs     01/28/23  0555 01/29/23  0640 01/30/23  0051   ALBUMIN 3.5 3.3 3.1*   TBILIRUBIN 0.3 0.3 0.2   ALKPHOSPHAT 105* 92 82   TOTPROTEIN 7.6 6.8 6.6   ALTSGPT 72* 60* 52*   ASTSGOT 32 29 22   CREATININE 0.76 0.73 0.79         Imaging:  CT-DRAIN-PERITONEAL    Result Date: 1/23/2023 1/23/2023 4:50 PM HISTORY/REASON FOR EXAM:  Drainage of 6.5 cm hypodense liver lesion on outside imaging, suspected liver abscess. Abdominal pain, fatigue, sepsis with fever 101, leukocytosis with WBC count 14 K at outside hospital. Remote history of diverticulitis in the 2000's. TECHNIQUE/EXAM DESCRIPTION: Liver abscess drainage with CT guidance. Low dose optimization technique was utilized for this CT exam including automated exposure control and adjustment of the mA and/or kV according to patient size. ALL ELEMENTS OF MAXIMAL STERILE BARRIER TECHNIQUE WERE FOLLOWED. SEDATION TIME: 30  minutes. Moderate sedation was administered with fentanyl and Versed with continuous patient monitoring by the interventional radiology nurse. PROCEDURE: Informed consent was obtained. Localizing CT images were obtained with the patient in supine position. The skin was prepped with ChloraPrep and draped in a sterile fashion. Moderate sedation was provided. Pulse oximetry and continuous cardiac monitoring by the nurse was performed throughout the exam. Intraservice time was 30 minutes. Following local anesthesia with 1% Lidocaine, a 17 G guiding needle was placed and needle path across the right lobe of the liver to the right lobe segment 7 posteriorly confirmed with CT. An Amplatz guidewire was placed and following serial tract dilatation, a 10 Syriac pigtail locking catheter was placed. A specimen was collected and submitted for culture and sensitivity and Gram stain. Total of 65 mL beige pus which was blood tinged was drained. The catheter was secured to the skin and connected to suction bulb drainage. Final CT images were obtained documenting catheter position. The patient tolerated the procedure well with no evidence of complication. COMPARISON: Outside films CT of the abdomen and pelvis with contrast 1/22/2023 FINDINGS: The final CT images show satisfactory catheter position within the target collection in the posterior right lobe at segment 7.     1.  CT GUIDED LIVER ABSCESS DRAINAGE WITH PLACEMENT OF A 10 Urdu LOCKING LOOP PIGTAIL CATHETER. EVACUATION 65 ML BEIGE BLOOD-TINGED PUS. 2.  THE CURRENT PLAN IS TO MONITOR DRAINAGE OUTPUT AND OBTAIN A FOLLOWUP CT SCAN IN 5-7 DAYS IF CLINICALLY INDICATED. ORDERS WERE WRITTEN FOR TWICE DAILY IRRIGATION OF THE PIGTAIL CATHETER WITH 5 ML STERILE SALINE.    US-RUQ    Result Date: 1/26/2023 1/26/2023 7:12 AM HISTORY/REASON FOR EXAM:  Abnormal Labs Abdominal pain TECHNIQUE/EXAM DESCRIPTION AND NUMBER OF VIEWS:  Real-time sonography of the liver and biliary tree.  "COMPARISON: CT abdomen and pelvis from outside facility 1/22/2023 FINDINGS: The liver is normal in contour with increased echogenicity. There is heterogeneous collection in the posterior right lobe, measuring about 6.4 x 5.8 x 6.2 cm presumably related to a hepatic abscess with drainage catheter in place. There are echogenic foci presumably related to air.. The liver measures 20.48 cm. The gallbladder is normal. There is no evidence of cholelithiasis. The gallbladder wall thickness measures 2.60 mm. There is no pericholecystic fluid. The common duct measures 4.80 mm. The visualized pancreas is unremarkable. The visualized aorta is normal in caliber. Intrahepatic IVC is patent. The portal vein is patent with hepatopetal flow. The MPV measures 1.21 cm. The right kidney measures 13.32 cm. There is no ascites.     1.  Hepatomegaly and hepatic steatosis. 2.  Posterior right hepatic collection/abscess measures about 6.4 x 5.8 x 6.2 cm.      Micro:  Results       Procedure Component Value Units Date/Time    BLOOD CULTURE [[id number]] Collected: 01/28/23 0829    Order Status: Completed Specimen: Blood from Peripheral Updated: 01/29/23 0635     Significant Indicator NEG     Source BLD     Site PERIPHERAL     Culture Result No Growth  Note: Blood cultures are incubated for 5 days and  are monitored continuously.Positive blood cultures  are called to the RN and reported as soon as  they are identified.      Narrative:      Per Hospital Policy: Only change Specimen Src: to \"Line\" if  specified by physician order.  Left AC    BLOOD CULTURE [[id number]] Collected: 01/28/23 0836    Order Status: Completed Specimen: Blood from Peripheral Updated: 01/29/23 0635     Significant Indicator NEG     Source BLD     Site PERIPHERAL     Culture Result No Growth  Note: Blood cultures are incubated for 5 days and  are monitored continuously.Positive blood cultures  are called to the RN and reported as soon as  they are identified.      " "Narrative:      Per Hospital Policy: Only change Specimen Src: to \"Line\" if  specified by physician order.  Right AC    BLOOD CULTURE [[id number]] Collected: 01/22/23 2047    Order Status: Completed Specimen: Blood from Peripheral Updated: 01/27/23 2300     Significant Indicator NEG     Source BLD     Site PERIPHERAL     Culture Result No growth after 5 days of incubation.    Narrative:      Per Hospital Policy: Only change Specimen Src: to \"Line\" if  specified by physician order.  Right Hand    BLOOD CULTURE [[id number]]  (Abnormal) Collected: 01/22/23 2047    Order Status: Completed Specimen: Blood from Peripheral Updated: 01/26/23 1253     Significant Indicator POS     Source BLD     Site PERIPHERAL     Culture Result Growth detected by Bactec instrument. 01/25/2023  05:59      Viridans Streptococcus  Possible Contaminant  Isolated from one set only, please correlate with clinical  condition. Contact the Microbiology department within 48 hr  if identification and susceptibility are needed.      Narrative:      CALL  [facility]  61 tel. [phone],  CALLED  61 tel. [phone] 01/25/2023, 06:03, RB PERF. RESULTS CALLED  TO:[phone] RN  Per Hospital Policy: Only change Specimen Src: to \"Line\" if  specified by physician order.  Left AC    CULTURE WOUND W/ GRAM STAIN [[id number]]  (Abnormal) Collected: 01/23/23 1730    Order Status: Completed Specimen: Wound Updated: 01/26/23 0835     Significant Indicator POS     Source WND     Site liver abscess     Culture Result -     Gram Stain Result Many WBCs.  Many Gram positive cocci.       Culture Result Streptococcus intermedius  Heavy growth      GRAM STAIN [[id number]] Collected: 01/23/23 1730    Order Status: Completed Specimen: Wound Updated: 01/23/23 2300     Significant Indicator .     Source WND     Site liver abscess     Gram Stain Result Many WBCs.  Many Gram positive cocci.      FLUID CULTURE W/GRAM STAIN [[id number]]     Order Status: No result Specimen: Other Body Fluid       "       Assessment:  Strep intermedius bacteremia  Liver abscess  Uncontrolled type 2 diabetes      PLAN:   S/p drain liver abscess 1/23  -Culture Strep intermedius  -Follow repeat blood cultures x2 from 1/28, pending  TTE with no obvious valvular vegetations  Continue IV Unasyn while inpatient  Repeat CT scan with improvement in size of abscess, largest dimension 3 cm  Anticipate being able to use p.o. Augmentin on discharge even for bacteremia as anticipating a prolonged course of multiple weeks and initial positivity appears to be transient bacteremia  Drain management per IR.  Only 5 mL output recorded today  Remains at risk for recurrent infections given significant underlying uncontrolled diabetes    Disposition:   Not cleared for discharge till at least 48 hours of negative blood cultures and IR drain management is established  Anticipate at least 4 weeks of oral antibiotics and discharged with p.o. Augmentin 875 mg twice daily through 2/25/2023  Repeat CBC with differential, CMP in approximately 4 weeks as well as repeat CT scan (ordered both)  Need for PICC line: Likely no     Plan of care discussed with CHRIS Morse M.D.    ID will sign off.  Please call with questions.

## 2023-01-30 NOTE — CARE PLAN
The patient is Stable - Low risk of patient condition declining or worsening    Shift Goals  Clinical Goals: abx therapy, monitor SANDER drain discharge, pain mgt, rest  Patient Goals: Rest, dscharge  Family Goals: elias      Problem: Knowledge Deficit - Standard  Goal: Patient and family/care givers will demonstrate understanding of plan of care, disease process/condition, diagnostic tests and medications  Outcome: Progressing     Problem: Pain - Standard  Goal: Alleviation of pain or a reduction in pain to the patient’s comfort goal  Outcome: Progressing     Problem: Fall Risk  Goal: Patient will remain free from falls  Outcome: Progressing     Progress made toward(s) clinical / shift goals:  Patient is alert and oriented x 4. Complaint of surgical site pain. Pain medication given, see MAR. SANDER drain with serosanguineous discharge about 10 ml drain at the start of the shift. IV site patent and saline locked. BS check and insulin coverage given. Call light and personal items within reached. Kept safe and monitored.      Patient is not progressing towards the following goals:

## 2023-01-30 NOTE — DISCHARGE SUMMARY
Discharge Summary    CHIEF COMPLAINT ON ADMISSION  No chief complaint on file.      Reason for Admission  Liver abscess     Admission Date  1/22/2023    CODE STATUS  Prior    HPI & HOSPITAL COURSE  Tejas Yusuf is a 57 y.o. male admitted 1/22/2023 with no significant past medical history found to have a cait-hepatic abscess and transferred to our facility for higher level of care. Abscess-pain started 1 week ago, poor appetite, progressively worse. No prior issues. No foreign travel or odd food ingestions.     S/p drain placement by IR 1/23, culture positive for Streptococcus intermedius, b/c 1/22 positive for Viridans Streptococcus. Patient has been treated with unasyn. repeated abdominal CT  1/29 showed a decrease size of hepatic abscess. TTE unremarkable with EF of 55%, negative for vegetation.  Repeated blood culture 1/28 negative. I discussed with ID, recommended at least 4 weeks of Augmentin until 2/25/2023. Need to Repeat a CBC, CMP and CT scan in 4 weeks (ordered by ID).      Elevated liver enzyme, likely due to liver abscess. RUQ U/S Hepatomegaly and hepatic steatosis; Posterior right hepatic collection/abscess measures about 6.4 x 5.8 x 6.2 cm. Liver function improved at discharge.      Patient was found new onset diabetes. A1c 11.6. Started on SSI and lantus. Patient will be discharged on lantus 20 units and SSI. Diabetes education was given at bedside.       Therefore, he is discharged in good and stable condition to home with close outpatient follow-up.    The patient met 2-midnight criteria for an inpatient stay at the time of discharge.    Discharge Date  1/30/2023    FOLLOW UP ITEMS POST DISCHARGE  - Follow up with primary care physician in 1 week for diabetes management  - Follow up with infectious disease as instructed, needs to repeat a CT and recheck CBC and CMP in 4 weeks  - Please take the medications as instructed    - Go to the local Emergency Department if you have any worsening condition.  "      DISCHARGE DIAGNOSES  Principal Problem:    Hepatic abscess POA: Yes  Active Problems:    New onset type 2 diabetes mellitus (HCC) POA: Yes    Sepsis (HCC) POA: Yes    Hyponatremia POA: Yes    Positive D dimer POA: Yes    Abnormal CT of the chest POA: Yes    Elevated liver enzymes POA: Unknown    Hypophosphatasia POA: Unknown    Bacteremia POA: Unknown  Resolved Problems:    * No resolved hospital problems. *      FOLLOW UP  - Follow up with primary care physician in 1 week for diabetes management  - Follow up with infectious disease as instructed, needs to repeat a CT and recheck CBC and CMP in 4 weeks      MEDICATIONS ON DISCHARGE     Medication List        START taking these medications        Instructions   Alcohol Prep 70 % Pads   Doctor's comments: Per formulary preference. ICD-10 code: E11.65 Uncontrolled type 2 Diabetes Mellitus  Wipe site with prep pad prior to injection.     amoxicillin-clavulanate 875-125 MG Tabs  Commonly known as: AUGMENTIN   Take 1 Tablet by mouth 2 times a day for 27 days.  Dose: 1 Tablet     BD Insulin Syringe U/F 30G X 1/2\" 0.3 ML Misc  Generic drug: Insulin Syringe-Needle U-100   Doctor's comments: Per formulary preference. ICD-10 code: E11.65 Uncontrolled type 2 Diabetes Mellitus  Use one syringe to inject insulin three times daily.  (Use one syringe to inject insulin three times daily.)     HumuLIN R 100 Unit/mL Soln  Generic drug: insulin regular   Doctor's comments: 70 - 150 mg/dL = 0 Units 151 - 200 mg/dL = 1 Units 201 - 250 mg/dL = 2 Units 251 - 300 mg/dL = 3 Units 301 - 350 mg/dL = 4 Units 351 - 400 mg/dL = 5 Units Over 400 mg/dL = 6 Units  Inject 1-6 Units under the skin 4 Times a Day,Before Meals and at Bedtime for 30 days.70 - 150 mg/dL = 0 Units 151 - 200 mg/dL = 1 Units 201 - 250 mg/dL = 2 Units 251 - 300 mg/dL = 3 Units 301 - 350 mg/dL = 4 Units 351 - 400 mg/dL = 5 Units Over 400 mg/dL = 6 Units  Dose: 1-6 Units     Lantus 100 UNIT/ML Soln  Generic drug: insulin " glargine   Inject 20 Units under the skin every evening for 30 days.  Dose: 20 Units     OneTouch Delica Plus [id number] Misc   Doctor's comments: Or per formulary preference. ICD-10 code: E11.65 Uncontrolled type 2 Diabetes Mellitus  Use one lancet to test blood sugar three times daily before meals.     OneTouch Verio Flex System w/Device Kit   Doctor's comments: Or per formulary preference. ICD-10 code: E11.65 Uncontrolled type 2 Diabetes Mellitus  Test blood sugar as recommended by provider.     OneTouch Verio strip  Generic drug: glucose blood   Doctor's comments: Or per formulary preference. ICD-10 code: E11.65 Uncontrolled type 2 Diabetes Mellitus  Use one strip to test blood sugar three times daily before meals.  (Use one strip to test blood sugar three times daily before meals.)     oxyCODONE immediate-release 5 MG Tabs  Commonly known as: ROXICODONE   Take 1 Tablet by mouth every 8 hours as needed for Severe Pain for up to 3 days.  Dose: 5 mg              Allergies  No Known Allergies    DIET  No orders of the defined types were placed in this encounter.      ACTIVITY  As tolerated.  Weight bearing as tolerated    CONSULTATIONS  ID, IR    PROCEDURES  IR drain placement    LABORATORY  Lab Results   Component Value Date    SODIUM 136 01/30/2023    POTASSIUM 4.6 01/30/2023    CHLORIDE 101 01/30/2023    CO2 26 01/30/2023    GLUCOSE 219 (H) 01/30/2023    BUN 13 01/30/2023    CREATININE 0.79 01/30/2023        Lab Results   Component Value Date    WBC 8.3 01/29/2023    HEMOGLOBIN 14.1 01/29/2023    HEMATOCRIT 43.9 01/29/2023    PLATELETCT 483 (H) 01/29/2023        Total time of the discharge process exceeds 35 minutes.

## 2023-01-31 NOTE — CONSULTS
Diabetes education: Pt is newly dx with diabetes, admitted with blood sugar of 250 and Hga1c of 11.6%.  Pt was seen before discharge.  Discussed what diabetes was,  type two, hypoglycemia,  hyperglycemia, and  goals for blood sugars,  Discussed need for carbohydrates and proteins, with every meal and why.   Discussed need, benefit and precautions with exercise.  Discussed  what effects blood sugars. Discussed need to follow up with PCP to review blood sugars post hospitalization.  Insulin was discussed as well, insulin storage, shelf life and site rotation. Pt practiced with saline pens, but will go home on vials and syringes. Pt practiced insulin and finger stick skills with nursing.  Handouts given and questions answered.

## 2023-02-02 LAB
BACTERIA BLD CULT: NORMAL
BACTERIA BLD CULT: NORMAL
SIGNIFICANT IND 70042: NORMAL
SIGNIFICANT IND 70042: NORMAL
SITE SITE: NORMAL
SITE SITE: NORMAL
SOURCE SOURCE: NORMAL
SOURCE SOURCE: NORMAL

## 2023-02-07 ENCOUNTER — OFFICE VISIT (OUTPATIENT)
Dept: INFECTIOUS DISEASES | Facility: MEDICAL CENTER | Age: 58
End: 2023-02-07
Attending: NURSE PRACTITIONER
Payer: COMMERCIAL

## 2023-02-07 VITALS
SYSTOLIC BLOOD PRESSURE: 128 MMHG | BODY MASS INDEX: 40.03 KG/M2 | OXYGEN SATURATION: 95 % | WEIGHT: 302.03 LBS | HEART RATE: 102 BPM | TEMPERATURE: 96 F | DIASTOLIC BLOOD PRESSURE: 90 MMHG | RESPIRATION RATE: 16 BRPM | HEIGHT: 73 IN

## 2023-02-07 DIAGNOSIS — E11.9 NEW ONSET TYPE 2 DIABETES MELLITUS (HCC): ICD-10-CM

## 2023-02-07 DIAGNOSIS — K75.0 HEPATIC ABSCESS: ICD-10-CM

## 2023-02-07 DIAGNOSIS — R74.8 ELEVATED LIVER ENZYMES: ICD-10-CM

## 2023-02-07 PROCEDURE — 99213 OFFICE O/P EST LOW 20 MIN: CPT | Performed by: NURSE PRACTITIONER

## 2023-02-07 PROCEDURE — 99211 OFF/OP EST MAY X REQ PHY/QHP: CPT | Performed by: NURSE PRACTITIONER

## 2023-02-07 ASSESSMENT — ENCOUNTER SYMPTOMS
SHORTNESS OF BREATH: 0
BRUISES/BLEEDS EASILY: 0
VOMITING: 0
FOCAL WEAKNESS: 0
COUGH: 0
PALPITATIONS: 0
NAUSEA: 0
CHILLS: 0
DOUBLE VISION: 0
ABDOMINAL PAIN: 0
SPUTUM PRODUCTION: 0
BLURRED VISION: 0
HEADACHES: 0
FEVER: 0
NERVOUS/ANXIOUS: 0
WHEEZING: 0
MYALGIAS: 0
DIZZINESS: 0
WEIGHT LOSS: 0

## 2023-02-07 ASSESSMENT — FIBROSIS 4 INDEX: FIB4 SCORE: 0.37

## 2023-02-07 NOTE — PROGRESS NOTES
ADULT  INFECTIOUS  DISEASE  CLINIC  FOLLOW  UP  NOTE     Reason for Follow Up:   Follow-up for   1. Hepatic abscess        2. Elevated liver enzymes        3. New onset type 2 diabetes mellitus (HCC)            HPI:   Tejas Yusuf is a 57 y.o.  with a history of diabetes.  Patient admission on 1/22/2023 due to liver abscess, patient was a transfer from [facility].  IR drain placement on 1/23/2023 with 65 mL beige blood mixed with pus.  Fluid cultures positive for Streptococcus intermedius.  Blood culture + strep. Patient was empirically started on Zosyn which was then changed to Unasyn.  TTE negative for vegetations.  Repeat CT scan from 1/28 Interval placement of a percutaneous drain into a right lobe hepatic abscess. Abscess has decreased in size with air/fluid component remaining in that area measuring approximately 3 x 1.5 cm in size. Plan was to DC home on Augmentin 875/125 mg twice daily with an end date of 2/25/2023 once repeat blood cultures negative.    2/7-patient reports the office today with no complaints of nausea, vomiting, diarrhea, fever or chills.  Currently taking Augmentin 875/125 twice daily with no reports of side effects and tolerating well.  He does have a remaining liver abscess drain which was disconnected from the SANDER bulb and extension tubing as patient reported he had minimal drainage output.  Patient reports that he has been struggling to get follow-up for the drain as it is hindering him from going back to work.      Current Antimicrobials: Augmentin 875 mg twice daily date of 2/25/2023    Previous Antimicrobials: Unasyn, Zosyn    Other Current Medications:  Home Medications    Medication Sig Taking? Last Dose Authorizing Provider   insulin glargine 100 UNIT/ML SC SOLN Inject 20 Units under the skin every evening for 30 days. Yes Taking Eufemia Morse M.D.   Blood Glucose Monitoring Suppl (BLOOD GLUCOSE SYSTEM VERONICA) Kit Test blood sugar as recommended by provider. Yes Taking  "Eufemia Morse M.D.   glucose blood strip Use one strip to test blood sugar three times daily before meals. Yes Taking Eufemia Morse M.D.   Alcohol Swabs Wipe site with prep pad prior to injection. Yes Taking Eufemia Morse M.D.   Lancets Use one lancet to test blood sugar three times daily before meals. Yes Taking Eufemia Morse M.D.   Insulin Syringe-Needle U-100 30G X 1/2\" 0.3 ML Misc Use one syringe to inject insulin three times daily. Yes Taking Eufemia Morse M.D.   insulin regular (HUMULIN R) 100 Unit/mL Solution Inject 1-6 Units under the skin 4 Times a Day,Before Meals and at Bedtime for 30 days.70 - 150 mg/dL = 0 Units 151 - 200 mg/dL = 1 Units 201 - 250 mg/dL = 2 Units 251 - 300 mg/dL = 3 Units 301 - 350 mg/dL = 4 Units 351 - 400 mg/dL = 5 Units Over 400 mg/dL = 6 Units Yes Taking Eufemia Morse M.D.   amoxicillin-clavulanate (AUGMENTIN) 875-125 MG Tab Take 1 Tablet by mouth 2 times a day for 27 days. Yes Taking Eufemia Morse M.D.        PMH:  No past medical history on file.  No past surgical history on file.  No family history on file.  Social History     Socioeconomic History    Marital status: Single     Spouse name: Not on file    Number of children: Not on file    Years of education: Not on file    Highest education level: Not on file   Occupational History    Not on file   Tobacco Use    Smoking status: Not on file    Smokeless tobacco: Not on file   Substance and Sexual Activity    Alcohol use: Not on file    Drug use: Not on file    Sexual activity: Not on file   Other Topics Concern    Not on file   Social History Narrative    Not on file     Social Determinants of Health     Financial Resource Strain: Not on file   Food Insecurity: Not on file   Transportation Needs: Not on file   Physical Activity: Not on file   Stress: Not on file   Social Connections: Not on file   Intimate Partner Violence: Not on file   Housing Stability: Not on file       Allergies/Intolerances:  No Known Allergies    Most Recent Vital Signs:  Blood Pressure " "(Abnormal) 128/90 (BP Location: Left arm, Patient Position: Sitting, BP Cuff Size: Large adult)   Pulse (Abnormal) 102   Temperature (Abnormal) 35.6 °C (96 °F) (Temporal)   Respiration 16   Height 1.854 m (6' 1\")   Weight (Abnormal) 137 kg (302 lb 0.5 oz)   Oxygen Saturation 95%   Body Mass Index 39.85 kg/m²     Subjective: patient reports the office today with no complaints of nausea, vomiting, diarrhea, fever or chills.  Currently taking Augmentin 875/125 twice daily with no reports of side effects and tolerating well.     ROS:   Review of Systems   Constitutional:  Negative for chills, fever, malaise/fatigue and weight loss.   HENT:  Negative for congestion and hearing loss.    Eyes:  Negative for blurred vision and double vision.   Respiratory:  Negative for cough, sputum production, shortness of breath and wheezing.    Cardiovascular:  Negative for chest pain and palpitations.   Gastrointestinal:  Negative for abdominal pain, nausea and vomiting.   Genitourinary:  Negative for dysuria.   Musculoskeletal:  Negative for myalgias.   Skin:  Negative for itching and rash.   Neurological:  Negative for dizziness, focal weakness and headaches.   Endo/Heme/Allergies:  Does not bruise/bleed easily.   Psychiatric/Behavioral:  The patient is not nervous/anxious.     ROS was reviewed and were negative except as above.    Physical Exam:  Physical Exam  Vitals reviewed.   Constitutional:       Appearance: Normal appearance. He is obese.   HENT:      Head: Normocephalic and atraumatic.      Nose: Nose normal.      Mouth/Throat:      Mouth: Mucous membranes are moist.   Eyes:      Pupils: Pupils are equal, round, and reactive to light.   Cardiovascular:      Rate and Rhythm: Normal rate.   Pulmonary:      Effort: Pulmonary effort is normal.      Breath sounds: Normal breath sounds.   Abdominal:      Palpations: Abdomen is soft.      Comments: Right abdominal liver abscess drain, site clean dry intact with no redness or " swelling.  No SANDER bulb or extension tubing on drain.  Stopcock to off position   Musculoskeletal:         General: No tenderness.      Cervical back: Normal range of motion and neck supple.   Skin:     General: Skin is warm and dry.      Coloration: Skin is not jaundiced.      Findings: No erythema or rash.   Neurological:      Mental Status: He is alert and oriented to person, place, and time.      Motor: No weakness.   Psychiatric:         Mood and Affect: Mood normal.         Behavior: Behavior normal.        Pertinent Lab Results:  WBC   Date/Time Value Ref Range Status   01/29/2023 06:40 AM 8.3 4.8 - 10.8 K/uL Final     RBC   Date/Time Value Ref Range Status   01/29/2023 06:40 AM 4.74 4.70 - 6.10 M/uL Final     Hemoglobin   Date/Time Value Ref Range Status   01/29/2023 06:40 AM 14.1 14.0 - 18.0 g/dL Final     Hematocrit   Date/Time Value Ref Range Status   01/29/2023 06:40 AM 43.9 42.0 - 52.0 % Final     MCV   Date/Time Value Ref Range Status   01/29/2023 06:40 AM 92.6 81.4 - 97.8 fL Final     MCH   Date/Time Value Ref Range Status   01/29/2023 06:40 AM 29.7 27.0 - 33.0 pg Final     MCHC   Date/Time Value Ref Range Status   01/29/2023 06:40 AM 32.1 (L) 33.7 - 35.3 g/dL Final     MPV   Date/Time Value Ref Range Status   01/29/2023 06:40 AM 9.0 9.0 - 12.9 fL Final        Sodium   Date/Time Value Ref Range Status   01/30/2023 12:51  135 - 145 mmol/L Final     Potassium   Date/Time Value Ref Range Status   01/30/2023 12:51 AM 4.6 3.6 - 5.5 mmol/L Final     Chloride   Date/Time Value Ref Range Status   01/30/2023 12:51  96 - 112 mmol/L Final     Co2   Date/Time Value Ref Range Status   01/30/2023 12:51 AM 26 20 - 33 mmol/L Final     Glucose   Date/Time Value Ref Range Status   01/30/2023 12:51  (H) 65 - 99 mg/dL Final     Bun   Date/Time Value Ref Range Status   01/30/2023 12:51 AM 13 8 - 22 mg/dL Final     Creatinine   Date/Time Value Ref Range Status   01/30/2023 12:51 AM 0.79 0.50 - 1.40 mg/dL  "Final     Alkaline Phosphatase   Date/Time Value Ref Range Status   01/30/2023 12:51 AM 82 30 - 99 U/L Final     AST(SGOT)   Date/Time Value Ref Range Status   01/30/2023 12:51 AM 22 12 - 45 U/L Final     ALT(SGPT)   Date/Time Value Ref Range Status   01/30/2023 12:51 AM 52 (H) 2 - 50 U/L Final     Total Bilirubin   Date/Time Value Ref Range Status   01/30/2023 12:51 AM 0.2 0.1 - 1.5 mg/dL Final      No results found for: CPKTOTAL       Micro:  Results         Procedure Component Value Units Date/Time     BLOOD CULTURE [[id number]] Collected: 01/28/23 0829     Order Status: Sent Specimen: Blood from Peripheral Updated: 01/28/23 0957     Narrative:       Per Hospital Policy: Only change Specimen Src: to \"Line\" if  specified by physician order.     BLOOD CULTURE [[id number]] Collected: 01/28/23 0836     Order Status: Sent Specimen: Blood from Peripheral Updated: 01/28/23 0956     Narrative:       Per Hospital Policy: Only change Specimen Src: to \"Line\" if  specified by physician order.     BLOOD CULTURE [[id number]] Collected: 01/22/23 2047     Order Status: Completed Specimen: Blood from Peripheral Updated: 01/27/23 2300       Significant Indicator NEG       Source BLD       Site PERIPHERAL       Culture Result No growth after 5 days of incubation.     Narrative:       Per Hospital Policy: Only change Specimen Src: to \"Line\" if  specified by physician order.  Right Hand     BLOOD CULTURE [[id number]]  (Abnormal) Collected: 01/22/23 2047     Order Status: Completed Specimen: Blood from Peripheral Updated: 01/26/23 1253       Significant Indicator POS       Source BLD       Site PERIPHERAL       Culture Result Growth detected by Bactec instrument. 01/25/2023  05:59         Viridans Streptococcus  Possible Contaminant  Isolated from one set only, please correlate with clinical  condition. Contact the Microbiology department within 48 hr  if identification and susceptibility are needed.       Narrative:       CALL  [facility]  [facility] " "tel. [phone],  CALLED  61 tel. [phone] 01/25/2023, 06:03, RB PERF. RESULTS CALLED  TO:[phone] RN  Per Hospital Policy: Only change Specimen Src: to \"Line\" if  specified by physician order.  Left AC     CULTURE WOUND W/ GRAM STAIN [[id number]]  (Abnormal) Collected: 01/23/23 1730     Order Status: Completed Specimen: Wound Updated: 01/26/23 0835       Significant Indicator POS       Source WND       Site liver abscess       Culture Result -       Gram Stain Result Many WBCs.  Many Gram positive cocci.          Culture Result Streptococcus intermedius  Heavy growth       GRAM STAIN [[id number]] Collected: 01/23/23 1730     Order Status: Completed Specimen: Wound Updated: 01/23/23 2300       Significant Indicator .       Source WND       Site liver abscess       Gram Stain Result Many WBCs.  Many Gram positive cocci.        FLUID CULTURE W/GRAM STAIN [[id number]]       Order Status: No result Specimen: Other Body Fluid       BLOOD CULTURE [[id number]]       Order Status: Canceled Specimen: Blood from Peripheral       BLOOD CULTURE [[id number]]       Order Status: Canceled Specimen: Blood from Peripheral          Impression/Plan:     1. Hepatic abscess        2. Elevated liver enzymes        3. New onset type 2 diabetes mellitus (HCC)            Tejas Yusuf is a 57 y.o.  with a history of diabetes.  Patient admission on 1/22/2023 due to liver abscess, patient was a transfer from [facility].  IR drain placement on 1/23/2023 with 65 mL beige blood mixed with pus.  Fluid cultures positive for Streptococcus intermedius.  Blood culture + strep. Patient was empirically started on Zosyn which was then changed to Unasyn.  TTE negative for vegetations. Repeat CT scan from 1/28 shows interval placement of a percutaneous drain into a right lobe hepatic abscess. Abscess has decreased in size with air/fluid component remaining in that area measuring approximately 3 x 1.5 cm in size  Plan was to DC home on Augmentin " 875/125 mg twice daily with an end date of 2/25/2023 once repeat blood cultures negative.  Patient has not had repeat CBC, CMP, or CT scan despite being ordered. Most recent labs from 1/30/2023 show a ALT  52 slightly elevated but trending down, likely due to hepatic abscess.  Previous glucose labs all elevated within the 200 range, likely due to underlying uncontrolled diabetes. Labs from 1/29/2023 WBC normal range at 8.3.  Patient tolerating p.o. antibiotics with no reports of side effects.  CT scan ordered for 1 week to eval abscess resolution and will have removed by IR.  Patient to get repeat labs/imaging this next week    PLAN:   -Augmentin 875/125 mg twice daily with anticipated end date of 2/25/2023   -Pending CT scan eval if hepatic abscess resolved, if drain output minimal and abscess resolved  will have drain removed by IR.  Patient given supplies and drain education.  He is to go back to IR for new extension tubing and SANDER drain as hepatic abscess could be loculated and provide more output as abscess resolves.  -Pending CBC/CMP.   -Discussed diabetes and elevated blood sugars which could result in delayed healing and recurrence of abscess. Last A1C 11.6.  Recommended patient close follow-up with PCP for diabetes management.  Previous hospital admission patient was started on SSI and Lantus.  Recommended follow-up with optometrist as patient reports improvement in vision and may need changes in prescription.    Return visit: 2 weeks. Follow up with primary care physician for chronic medical problems      I have performed a physical exam and reviewed and updated ROS and plan today. I have reviewed previous images, labs, and provider notes.      ROHITH Bliss.      Please note that this dictation was created using voice recognition software. I have worked with technical experts from [facility] to optimize the interface.  I have made every reasonable attempt to correct obvious errors, but there  may be errors of grammar and possibly content that I did not discover before finalizing the note.

## 2023-02-17 ENCOUNTER — HOSPITAL ENCOUNTER (OUTPATIENT)
Dept: RADIOLOGY | Facility: MEDICAL CENTER | Age: 58
End: 2023-02-17
Attending: NURSE PRACTITIONER
Payer: COMMERCIAL

## 2023-02-17 DIAGNOSIS — K75.0 HEPATIC ABSCESS: ICD-10-CM

## 2023-02-17 PROCEDURE — 74160 CT ABDOMEN W/CONTRAST: CPT

## 2023-02-17 PROCEDURE — 700117 HCHG RX CONTRAST REV CODE 255: Performed by: NURSE PRACTITIONER

## 2023-02-17 RX ADMIN — IOHEXOL 100 ML: 350 INJECTION, SOLUTION INTRAVENOUS at 13:10

## 2023-02-17 NOTE — PROGRESS NOTES
Repeat CT scan from 2/17/2023 Percutaneous drainage catheter projects into the posterior dome of the right lobe of the liver. Small residual fluid collection remains measuring approximately 2.9 x 1.5 cm. No gas within the collection identified.  Called and discussed his imaging results with patient.  We will place orders for IR abscessogram and drain removal.  Patient to still follow-up with ID after drain removal

## 2023-02-22 ENCOUNTER — APPOINTMENT (OUTPATIENT)
Dept: INFECTIOUS DISEASES | Facility: MEDICAL CENTER | Age: 58
End: 2023-02-22
Attending: NURSE PRACTITIONER
Payer: COMMERCIAL

## 2023-02-22 DIAGNOSIS — K75.0 HEPATIC ABSCESS: ICD-10-CM

## 2023-02-22 RX ORDER — AMOXICILLIN AND CLAVULANATE POTASSIUM 875; 125 MG/1; MG/1
1 TABLET, FILM COATED ORAL 2 TIMES DAILY
Qty: 32 TABLET | Refills: 0 | Status: SHIPPED | OUTPATIENT
Start: 2023-02-22 | End: 2023-03-10

## 2023-02-22 ASSESSMENT — ENCOUNTER SYMPTOMS
PALPITATIONS: 0
NAUSEA: 0
ABDOMINAL PAIN: 0
CHILLS: 0
VOMITING: 0
COUGH: 0
FOCAL WEAKNESS: 0
BRUISES/BLEEDS EASILY: 0
BLURRED VISION: 0
DOUBLE VISION: 0
MYALGIAS: 0
WEIGHT LOSS: 0
NERVOUS/ANXIOUS: 0
HEADACHES: 0
SHORTNESS OF BREATH: 0
WHEEZING: 0
SPUTUM PRODUCTION: 0
DIZZINESS: 0
FEVER: 0

## 2023-02-22 NOTE — PROGRESS NOTES
INFECTIOUS  DISEASE  OUTPATIENT CLINIC  NOTE   Subjective   Primary care provider: Pcp Pt States None.     Reason for Follow Up:   Follow-up for   1. Hepatic abscess        2. New onset type 2 diabetes mellitus (HCC)            HPI: Patient previously seen and treated by ID team as inpatient during hospital admission.     Tejas Yusuf is a 57 y.o.  with a history of diabetes.  Patient admission on 1/22/2023 due to liver abscess, patient was a transfer from [facility].  IR drain placement on 1/23/2023 with 65 mL beige blood mixed with pus.  Fluid cultures positive for Streptococcus intermedius.  Blood culture + strep. Patient was empirically started on Zosyn which was then changed to Unasyn.  TTE negative for vegetations.  Repeat CT scan from 1/28 Interval placement of a percutaneous drain into a right lobe hepatic abscess. Abscess has decreased in size with air/fluid component remaining in that area measuring approximately 3 x 1.5 cm in size. Plan was to DC home on Augmentin 875/125 mg twice daily with an end date of 2/25/2023 once repeat blood cultures negative.    2/7-patient reports the office today with no complaints of nausea, vomiting, diarrhea, fever or chills.  Currently taking Augmentin 875/125 twice daily with no reports of side effects and tolerating well.  He does have a remaining liver abscess drain which was disconnected from the SANDER bulb and extension tubing as patient reported he had minimal drainage output.  Patient reports that he has been struggling to get follow-up for the drain as it is hindering him from going back to work.    2/17- Repeat CT scan from 2/17/2023 Percutaneous drainage catheter projects into the posterior dome of the right lobe of the liver. Small residual fluid collection remains measuring approximately 2.9 x 1.5 cm. No gas within the collection identified.  Called and discussed his imaging results with patient.  We will place orders for IR abscessogram and drain  "removal.  Patient to still follow-up with ID after drain removal    02/22/23- Today Patient reports feeling well.     Denies drainage, pungent odor, redness, pain. Denies feeling generally ill, fevers/chills, general malaise, headache, n/v/d.     Current Antimicrobials:   Previous Antimicrobials:    Other Current Medications:  Home Medications    Medication Sig Taking? Last Dose Authorizing Provider   insulin glargine 100 UNIT/ML SC SOLN Inject 20 Units under the skin every evening for 30 days.   Eufemia Morse M.D.   Blood Glucose Monitoring Suppl (BLOOD GLUCOSE SYSTEM VERONICA) Kit Test blood sugar as recommended by provider.   Eufemia Morse M.D.   glucose blood strip Use one strip to test blood sugar three times daily before meals.   Eufemia Morse M.D.   Alcohol Swabs Wipe site with prep pad prior to injection.   Eufemia Morse M.D.   Lancets Use one lancet to test blood sugar three times daily before meals.   Eufemia Morse M.D.   Insulin Syringe-Needle U-100 30G X 1/2\" 0.3 ML Misc Use one syringe to inject insulin three times daily.   Eufemia Morse M.D.   insulin regular (HUMULIN R) 100 Unit/mL Solution Inject 1-6 Units under the skin 4 Times a Day,Before Meals and at Bedtime for 30 days.70 - 150 mg/dL = 0 Units 151 - 200 mg/dL = 1 Units 201 - 250 mg/dL = 2 Units 251 - 300 mg/dL = 3 Units 301 - 350 mg/dL = 4 Units 351 - 400 mg/dL = 5 Units Over 400 mg/dL = 6 Units   Eufemia Morse M.D.   amoxicillin-clavulanate (AUGMENTIN) 875-125 MG Tab Take 1 Tablet by mouth 2 times a day for 27 days.   Eufemia Morse M.D.        PMH:  No past medical history on file.  No past surgical history on file.  No family history on file.  Social History     Socioeconomic History    Marital status: Single     Spouse name: Not on file    Number of children: Not on file    Years of education: Not on file    Highest education level: Not on file   Occupational History    Not on file   Tobacco Use    Smoking status: Not on file    Smokeless tobacco: Not on file   Substance and Sexual " Activity    Alcohol use: Not on file    Drug use: Not on file    Sexual activity: Not on file   Other Topics Concern    Not on file   Social History Narrative    Not on file     Social Determinants of Health     Financial Resource Strain: Not on file   Food Insecurity: Not on file   Transportation Needs: Not on file   Physical Activity: Not on file   Stress: Not on file   Social Connections: Not on file   Intimate Partner Violence: Not on file   Housing Stability: Not on file           Allergies/Intolerances:  No Known Allergies    ROS:   Review of Systems   Constitutional:  Negative for chills, fever, malaise/fatigue and weight loss.   HENT:  Negative for congestion and hearing loss.    Eyes:  Negative for blurred vision and double vision.   Respiratory:  Negative for cough, sputum production, shortness of breath and wheezing.    Cardiovascular:  Negative for chest pain and palpitations.   Gastrointestinal:  Negative for abdominal pain, nausea and vomiting.   Genitourinary:  Negative for dysuria.   Musculoskeletal:  Negative for myalgias.   Skin:  Negative for itching and rash.   Neurological:  Negative for dizziness, focal weakness and headaches.   Endo/Heme/Allergies:  Does not bruise/bleed easily.   Psychiatric/Behavioral:  The patient is not nervous/anxious.     ROS was reviewed and were negative except as above.    Objective    Most Recent Vital Signs:  There were no vitals taken for this visit.    Physical Exam:  Physical Exam  Vitals reviewed.   Constitutional:       Appearance: Normal appearance. He is obese.   HENT:      Head: Normocephalic and atraumatic.      Nose: Nose normal.      Mouth/Throat:      Mouth: Mucous membranes are moist.   Eyes:      Pupils: Pupils are equal, round, and reactive to light.   Cardiovascular:      Rate and Rhythm: Normal rate.   Pulmonary:      Effort: Pulmonary effort is normal.      Breath sounds: Normal breath sounds.   Abdominal:      Palpations: Abdomen is soft.       Comments: Right abdominal liver abscess drain, site clean dry intact with no redness or swelling.     Musculoskeletal:         General: No tenderness.      Cervical back: Normal range of motion and neck supple.   Skin:     General: Skin is warm and dry.      Coloration: Skin is not jaundiced.      Findings: No erythema or rash.   Neurological:      Mental Status: He is alert and oriented to person, place, and time.      Motor: No weakness.   Psychiatric:         Mood and Affect: Mood normal.         Behavior: Behavior normal.        Pertinent Lab/Imaging Results:  [unfilled]  @CMP@  WBC   Date/Time Value Ref Range Status   01/29/2023 06:40 AM 8.3 4.8 - 10.8 K/uL Final     RBC   Date/Time Value Ref Range Status   01/29/2023 06:40 AM 4.74 4.70 - 6.10 M/uL Final     Hemoglobin   Date/Time Value Ref Range Status   01/29/2023 06:40 AM 14.1 14.0 - 18.0 g/dL Final     Hematocrit   Date/Time Value Ref Range Status   01/29/2023 06:40 AM 43.9 42.0 - 52.0 % Final     MCV   Date/Time Value Ref Range Status   01/29/2023 06:40 AM 92.6 81.4 - 97.8 fL Final     MCH   Date/Time Value Ref Range Status   01/29/2023 06:40 AM 29.7 27.0 - 33.0 pg Final     MCHC   Date/Time Value Ref Range Status   01/29/2023 06:40 AM 32.1 (L) 33.7 - 35.3 g/dL Final     MPV   Date/Time Value Ref Range Status   01/29/2023 06:40 AM 9.0 9.0 - 12.9 fL Final      Sodium   Date/Time Value Ref Range Status   01/30/2023 12:51  135 - 145 mmol/L Final     Potassium   Date/Time Value Ref Range Status   01/30/2023 12:51 AM 4.6 3.6 - 5.5 mmol/L Final     Chloride   Date/Time Value Ref Range Status   01/30/2023 12:51  96 - 112 mmol/L Final     Co2   Date/Time Value Ref Range Status   01/30/2023 12:51 AM 26 20 - 33 mmol/L Final     Glucose   Date/Time Value Ref Range Status   01/30/2023 12:51  (H) 65 - 99 mg/dL Final     Bun   Date/Time Value Ref Range Status   01/30/2023 12:51 AM 13 8 - 22 mg/dL Final     Creatinine   Date/Time Value Ref Range Status    01/30/2023 12:51 AM 0.79 0.50 - 1.40 mg/dL Final     Alkaline Phosphatase   Date/Time Value Ref Range Status   01/30/2023 12:51 AM 82 30 - 99 U/L Final     AST(SGOT)   Date/Time Value Ref Range Status   01/30/2023 12:51 AM 22 12 - 45 U/L Final     ALT(SGPT)   Date/Time Value Ref Range Status   01/30/2023 12:51 AM 52 (H) 2 - 50 U/L Final     Total Bilirubin   Date/Time Value Ref Range Status   01/30/2023 12:51 AM 0.2 0.1 - 1.5 mg/dL Final      No results found for: CPKTOTAL       No results found for: BLOODCULTU, BLDCULT, BCHOLD    No results found for: BLOODCULTU, BLDCULT, BCHOLD         Impression/Assessment      1. Hepatic abscess        2. New onset type 2 diabetes mellitus (HCC)          Tejas Yusuf is a 57 y.o.  with a history of diabetes.  Patient admission on 1/22/2023 due to liver abscess, patient was a transfer from [facility].  IR drain placement on 1/23/2023 with 65 mL beige blood mixed with pus.  Fluid cultures positive for Streptococcus intermedius.  Blood culture + strep. Patient was empirically started on Zosyn which was then changed to Unasyn.  TTE negative for vegetations. Repeat CT scan from 1/28 shows interval placement of a percutaneous drain into a right lobe hepatic abscess. Abscess has decreased in size with air/fluid component remaining in that area measuring approximately 3 x 1.5 cm in size  Plan was to DC home on Augmentin 875/125 mg twice daily     Repeat CT scan shows near resolution of hepatic abscess.  Pending IR drain removal    PLAN:   -Augmentin 875/125 mg twice daily extended until drain removal  -Patient to complete labs CBC/CMP  -Diabetes education provided, last A1c from 1/22/23   11.6      Return visit: ***. Follow up with primary care physician for chronic medical problems      I have performed a physical exam,  updated ROS and plan today. I have reviewed previous images, labs, and provider notes.      ROHITH Bliss.    All Patients should seek  medical re-evaluation or report to the ER for new, increasing or worsening symptoms. In some circumstances medical conditions can change from the initial evaluation and may require emergent medical re-evaluation. This includes but is not limited to chest pain, shortness of breath, atypical abdominal pain, atypical headache, ALOC, fever >101, low blood pressure, high respiratory rate (above 30), low oxygen saturation (below 90%), acute delirium, abnormal bleeding, inability to tolerate any intake, weakness on one side of the body, any worsened or concerning conditions.    Please note that this dictation was created using voice recognition software. I have worked with technical experts from [facility] to optimize the interface.  I have made every reasonable attempt to correct obvious errors, but there may be errors of grammar and possibly content that I did not discover before finalizing the note.

## 2023-02-22 NOTE — PROGRESS NOTES
4 Eyes Skin Assessment Completed by Quincy RN and LAURA Lincoln.    Head WDL  Ears WDL  Nose WDL  Mouth WDL  Neck WDL  Breast/Chest WDL  Shoulder Blades WDL  Spine WDL  (R) Arm/Elbow/Hand WDL  (L) Arm/Elbow/Hand WDL  Abdomen WDL  Groin WDL  Scrotum/Coccyx/Buttocks WDL  (R) Leg WDL  (L) Leg WDL  (R) Heel/Foot/Toe WDL  (L) Heel/Foot/Toe WDL      Interventions In Place N/A    Possible Skin Injury No    Pictures Uploaded Into Epic N/A  Wound Consult Placed N/A  RN Wound Prevention Protocol Ordered No     Procedure:  EGD    Relevant Problems   CARDIO   (+) Hyperlipidemia      NEURO/PSYCH   (+) Disease of spinal cord (HCC)   (+) History of breast cancer        Physical Exam    Airway    Mallampati score: I  TM Distance: >3 FB  Neck ROM: full     Dental   No notable dental hx     Cardiovascular      Pulmonary      Other Findings        Anesthesia Plan  ASA Score- 2     Anesthesia Type- IV sedation with anesthesia with ASA Monitors  Additional Monitors:   Airway Plan:           Plan Factors-Exercise tolerance (METS): >4 METS  Chart reviewed  Patient summary reviewed  Patient is not a current smoker  Obstructive sleep apnea risk education given perioperatively  Induction- intravenous  Postoperative Plan-     Informed Consent- Anesthetic plan and risks discussed with patient  I personally reviewed this patient with the CRNA  Discussed and agreed on the Anesthesia Plan with the CRNA  Oscar Mcelroy

## 2023-02-22 NOTE — PROGRESS NOTES
Patient scheduled to have IR drain removed on 3/7/2023.  Okay to follow-up with ID clinic after drain removal.  Continue Augmentin 875/125 mg twice daily until drain removed.  He is scheduled to follow-up with ID clinic day of or after drain removal.

## 2023-03-07 ENCOUNTER — HOSPITAL ENCOUNTER (OUTPATIENT)
Dept: LAB | Facility: MEDICAL CENTER | Age: 58
End: 2023-03-07
Attending: NURSE PRACTITIONER
Payer: COMMERCIAL

## 2023-03-07 ENCOUNTER — HOSPITAL ENCOUNTER (OUTPATIENT)
Facility: MEDICAL CENTER | Age: 58
End: 2023-03-07
Attending: INTERNAL MEDICINE | Admitting: INTERNAL MEDICINE
Payer: COMMERCIAL

## 2023-03-07 ENCOUNTER — OFFICE VISIT (OUTPATIENT)
Dept: INFECTIOUS DISEASES | Facility: MEDICAL CENTER | Age: 58
End: 2023-03-07
Attending: INTERNAL MEDICINE
Payer: COMMERCIAL

## 2023-03-07 ENCOUNTER — APPOINTMENT (OUTPATIENT)
Dept: RADIOLOGY | Facility: MEDICAL CENTER | Age: 58
End: 2023-03-07
Attending: NURSE PRACTITIONER
Payer: COMMERCIAL

## 2023-03-07 VITALS
WEIGHT: 306.22 LBS | SYSTOLIC BLOOD PRESSURE: 118 MMHG | BODY MASS INDEX: 40.58 KG/M2 | TEMPERATURE: 98.1 F | OXYGEN SATURATION: 93 % | HEIGHT: 73 IN | RESPIRATION RATE: 16 BRPM | DIASTOLIC BLOOD PRESSURE: 70 MMHG | HEART RATE: 88 BPM

## 2023-03-07 VITALS
OXYGEN SATURATION: 94 % | HEART RATE: 75 BPM | WEIGHT: 295 LBS | HEIGHT: 73 IN | BODY MASS INDEX: 39.1 KG/M2 | RESPIRATION RATE: 16 BRPM | DIASTOLIC BLOOD PRESSURE: 75 MMHG | SYSTOLIC BLOOD PRESSURE: 135 MMHG

## 2023-03-07 DIAGNOSIS — K75.0 HEPATIC ABSCESS: ICD-10-CM

## 2023-03-07 DIAGNOSIS — E11.69 DIABETES MELLITUS TYPE 2 IN OBESE: ICD-10-CM

## 2023-03-07 DIAGNOSIS — E66.9 DIABETES MELLITUS TYPE 2 IN OBESE: ICD-10-CM

## 2023-03-07 LAB
BASOPHILS # BLD AUTO: 0.5 % (ref 0–1.8)
BASOPHILS # BLD: 0.04 K/UL (ref 0–0.12)
EOSINOPHIL # BLD AUTO: 0.16 K/UL (ref 0–0.51)
EOSINOPHIL NFR BLD: 1.9 % (ref 0–6.9)
ERYTHROCYTE [DISTWIDTH] IN BLOOD BY AUTOMATED COUNT: 47.8 FL (ref 35.9–50)
HCT VFR BLD AUTO: 46.4 % (ref 42–52)
HGB BLD-MCNC: 15.3 G/DL (ref 14–18)
IMM GRANULOCYTES # BLD AUTO: 0.05 K/UL (ref 0–0.11)
IMM GRANULOCYTES NFR BLD AUTO: 0.6 % (ref 0–0.9)
LYMPHOCYTES # BLD AUTO: 2.34 K/UL (ref 1–4.8)
LYMPHOCYTES NFR BLD: 27.8 % (ref 22–41)
MCH RBC QN AUTO: 29.7 PG (ref 27–33)
MCHC RBC AUTO-ENTMCNC: 33 G/DL (ref 33.7–35.3)
MCV RBC AUTO: 90.1 FL (ref 81.4–97.8)
MONOCYTES # BLD AUTO: 0.55 K/UL (ref 0–0.85)
MONOCYTES NFR BLD AUTO: 6.5 % (ref 0–13.4)
NEUTROPHILS # BLD AUTO: 5.27 K/UL (ref 1.82–7.42)
NEUTROPHILS NFR BLD: 62.7 % (ref 44–72)
NRBC # BLD AUTO: 0 K/UL
NRBC BLD-RTO: 0 /100 WBC
PLATELET # BLD AUTO: 221 K/UL (ref 164–446)
PMV BLD AUTO: 10.9 FL (ref 9–12.9)
RBC # BLD AUTO: 5.15 M/UL (ref 4.7–6.1)
WBC # BLD AUTO: 8.4 K/UL (ref 4.8–10.8)

## 2023-03-07 PROCEDURE — 700117 HCHG RX CONTRAST REV CODE 255: Performed by: NURSE PRACTITIONER

## 2023-03-07 PROCEDURE — 36415 COLL VENOUS BLD VENIPUNCTURE: CPT

## 2023-03-07 PROCEDURE — 47537 REMOVAL BILIARY DRG CATH: CPT

## 2023-03-07 PROCEDURE — 99211 OFF/OP EST MAY X REQ PHY/QHP: CPT | Performed by: NURSE PRACTITIONER

## 2023-03-07 PROCEDURE — 99213 OFFICE O/P EST LOW 20 MIN: CPT | Performed by: NURSE PRACTITIONER

## 2023-03-07 PROCEDURE — 80053 COMPREHEN METABOLIC PANEL: CPT

## 2023-03-07 PROCEDURE — 85025 COMPLETE CBC W/AUTO DIFF WBC: CPT

## 2023-03-07 RX ORDER — OXYCODONE HYDROCHLORIDE 5 MG/1
TABLET ORAL
COMMUNITY
Start: 2023-03-01

## 2023-03-07 RX ADMIN — IOHEXOL 2 ML: 300 INJECTION, SOLUTION INTRAVENOUS at 15:00

## 2023-03-07 ASSESSMENT — ENCOUNTER SYMPTOMS
SHORTNESS OF BREATH: 0
ABDOMINAL PAIN: 0
VOMITING: 0
PALPITATIONS: 0
BLURRED VISION: 0
MYALGIAS: 0
COUGH: 0
HEADACHES: 0
CHILLS: 0
NAUSEA: 0
DOUBLE VISION: 0
DIZZINESS: 0
BRUISES/BLEEDS EASILY: 0
FOCAL WEAKNESS: 0
NERVOUS/ANXIOUS: 0
WHEEZING: 0
FEVER: 0
WEIGHT LOSS: 0
SPUTUM PRODUCTION: 0

## 2023-03-07 ASSESSMENT — FIBROSIS 4 INDEX
FIB4 SCORE: 0.37
FIB4 SCORE: 0.37

## 2023-03-07 NOTE — PROGRESS NOTES
INFECTIOUS  DISEASE  OUTPATIENT CLINIC  NOTE   Subjective   Primary care provider: CARLOS Can.     Reason for Follow Up:   Follow-up for   1. Hepatic abscess        2. Diabetes mellitus type 2 in obese (HCC)            HPI: Patient previously seen and treated by ID team as inpatient during hospital admission.   Tejas Yusuf is a 57 y.o.  with a history of diabetes.  Patient admission on 1/22/2023 due to liver abscess, patient was a transfer from [facility].  IR drain placement on 1/23/2023 with 65 mL beige blood mixed with pus.  Fluid cultures positive for Streptococcus intermedius.  Blood culture + strep. Patient was empirically started on Zosyn which was then changed to Unasyn.  TTE negative for vegetations.  Repeat CT scan from 1/28 Interval placement of a percutaneous drain into a right lobe hepatic abscess. Abscess has decreased in size with air/fluid component remaining in that area measuring approximately 3 x 1.5 cm in size. Plan was to DC home on Augmentin 875/125 mg twice daily with an end date of 2/25/2023 once repeat blood cultures negative.     2/7-patient reports the office today with no complaints of nausea, vomiting, diarrhea, fever or chills.  Currently taking Augmentin 875/125 twice daily with no reports of side effects and tolerating well.  He does have a remaining liver abscess drain which was disconnected from the SANDER bulb and extension tubing as patient reported he had minimal drainage output.  Patient reports that he has been struggling to get follow-up for the drain as it is hindering him from going back to work. Most recent labs from 1/30/2023 show a ALT  52 slightly elevated but trending down, likely due to hepatic abscess.  Previous glucose labs all elevated within the 200 range, likely due to underlying uncontrolled diabetes. Labs from 1/29/2023 WBC normal range at 8.3.  Patient tolerating p.o. antibiotics with no reports of side effects.  CT scan  "ordered for 1 week to eval abscess resolution and will have removed by IR.  Patient to get repeat labs/imaging this next week    2/17- Repeat CT scan shows Percutaneous drainage catheter projects into the posterior dome of the right lobe of the liver. Small residual fluid collection remains measuring approximately 2.9 x 1.5 cm. No gas within the collection identified.  Called and discussed his imaging results with patient.  We will place orders for IR abscessogram and drain removal.  Patient to still follow-up with ID after drain removal    03/07/23- Patient scheduled for IR drain removal today. Continue Augmentin 875/125 mg twice daily until IR OP report. today Patient reports feeling well. Denies drainage, pungent odor, redness, pain. Denies feeling generally ill, fevers/chills, general malaise, headache, n/v/d.     Current Antimicrobials: Augmentin 875/125 mg twice daily  Previous Antimicrobials: Zosyn, Unasyn    Other Current Medications:  Home Medications    Medication Sig Taking? Last Dose Authorizing Provider   amoxicillin-clavulanate (AUGMENTIN) 875-125 MG Tab Take 1 Tablet by mouth 2 times a day for 16 days.   KATHY Bliss   Blood Glucose Monitoring Suppl (BLOOD GLUCOSE SYSTEM VERONICA) Kit Test blood sugar as recommended by provider.   Eufemia Morse M.D.   glucose blood strip Use one strip to test blood sugar three times daily before meals.   Eufemia Morse M.D.   Alcohol Swabs Wipe site with prep pad prior to injection.   Eufemia Morse M.D.   Lancets Use one lancet to test blood sugar three times daily before meals.   Eufemia Morse M.D.   Insulin Syringe-Needle U-100 30G X 1/2\" 0.3 ML Misc Use one syringe to inject insulin three times daily.   Eufemia Morse M.D.        PMH:  No past medical history on file.  No past surgical history on file.  No family history on file.  Social History     Socioeconomic History    Marital status: Single     Spouse name: Not on file    Number of children: Not on file    Years of education: Not " on file    Highest education level: Not on file   Occupational History    Not on file   Tobacco Use    Smoking status: Not on file    Smokeless tobacco: Not on file   Substance and Sexual Activity    Alcohol use: Not on file    Drug use: Not on file    Sexual activity: Not on file   Other Topics Concern    Not on file   Social History Narrative    Not on file     Social Determinants of Health     Financial Resource Strain: Not on file   Food Insecurity: Not on file   Transportation Needs: Not on file   Physical Activity: Not on file   Stress: Not on file   Social Connections: Not on file   Intimate Partner Violence: Not on file   Housing Stability: Not on file           Allergies/Intolerances:  No Known Allergies    ROS:   Review of Systems   Constitutional:  Negative for chills, fever, malaise/fatigue and weight loss.   HENT:  Negative for congestion and hearing loss.    Eyes:  Negative for blurred vision and double vision.   Respiratory:  Negative for cough, sputum production, shortness of breath and wheezing.    Cardiovascular:  Negative for chest pain and palpitations.   Gastrointestinal:  Negative for abdominal pain, nausea and vomiting.   Genitourinary:  Negative for dysuria.   Musculoskeletal:  Negative for myalgias.        Discomfort at drain site   Skin:  Negative for itching and rash.   Neurological:  Negative for dizziness, focal weakness and headaches.   Endo/Heme/Allergies:  Does not bruise/bleed easily.   Psychiatric/Behavioral:  The patient is not nervous/anxious.     ROS was reviewed and were negative except as above.    Objective    Most Recent Vital Signs:  There were no vitals taken for this visit.    Physical Exam:  Physical Exam  Vitals reviewed.   Constitutional:       Appearance: He is obese.   HENT:      Head: Normocephalic and atraumatic.      Nose: Nose normal.      Mouth/Throat:      Mouth: Mucous membranes are moist.   Eyes:      Pupils: Pupils are equal, round, and reactive to light.    Cardiovascular:      Rate and Rhythm: Normal rate.   Pulmonary:      Effort: Pulmonary effort is normal.      Breath sounds: Normal breath sounds.   Abdominal:      Palpations: Abdomen is soft.      Comments: Right upper quadrant drain, drain stopcock turned off and not connected any external drainage bag/collection device.    Musculoskeletal:         General: No tenderness.      Cervical back: Normal range of motion and neck supple.   Skin:     General: Skin is warm and dry.      Coloration: Skin is not jaundiced.      Findings: No erythema or rash.   Neurological:      Mental Status: He is alert and oriented to person, place, and time.      Motor: No weakness.   Psychiatric:         Mood and Affect: Mood normal.         Behavior: Behavior normal.        Pertinent Lab/Imaging Results:  [unfilled]  @CMP@  WBC   Date/Time Value Ref Range Status   01/29/2023 06:40 AM 8.3 4.8 - 10.8 K/uL Final     RBC   Date/Time Value Ref Range Status   01/29/2023 06:40 AM 4.74 4.70 - 6.10 M/uL Final     Hemoglobin   Date/Time Value Ref Range Status   01/29/2023 06:40 AM 14.1 14.0 - 18.0 g/dL Final     Hematocrit   Date/Time Value Ref Range Status   01/29/2023 06:40 AM 43.9 42.0 - 52.0 % Final     MCV   Date/Time Value Ref Range Status   01/29/2023 06:40 AM 92.6 81.4 - 97.8 fL Final     MCH   Date/Time Value Ref Range Status   01/29/2023 06:40 AM 29.7 27.0 - 33.0 pg Final     MCHC   Date/Time Value Ref Range Status   01/29/2023 06:40 AM 32.1 (L) 33.7 - 35.3 g/dL Final     MPV   Date/Time Value Ref Range Status   01/29/2023 06:40 AM 9.0 9.0 - 12.9 fL Final      Sodium   Date/Time Value Ref Range Status   01/30/2023 12:51  135 - 145 mmol/L Final     Potassium   Date/Time Value Ref Range Status   01/30/2023 12:51 AM 4.6 3.6 - 5.5 mmol/L Final     Chloride   Date/Time Value Ref Range Status   01/30/2023 12:51  96 - 112 mmol/L Final     Co2   Date/Time Value Ref Range Status   01/30/2023 12:51 AM 26 20 - 33 mmol/L Final      Glucose   Date/Time Value Ref Range Status   01/30/2023 12:51  (H) 65 - 99 mg/dL Final     Bun   Date/Time Value Ref Range Status   01/30/2023 12:51 AM 13 8 - 22 mg/dL Final     Creatinine   Date/Time Value Ref Range Status   01/30/2023 12:51 AM 0.79 0.50 - 1.40 mg/dL Final     Alkaline Phosphatase   Date/Time Value Ref Range Status   01/30/2023 12:51 AM 82 30 - 99 U/L Final     AST(SGOT)   Date/Time Value Ref Range Status   01/30/2023 12:51 AM 22 12 - 45 U/L Final     ALT(SGPT)   Date/Time Value Ref Range Status   01/30/2023 12:51 AM 52 (H) 2 - 50 U/L Final     Total Bilirubin   Date/Time Value Ref Range Status   01/30/2023 12:51 AM 0.2 0.1 - 1.5 mg/dL Final      No results found for: CPKTOTAL       No results found for: BLOODCULTU, BLDCULT, BCHOLD    No results found for: BLOODCULTU, BLDCULT, BCHOLD       Contains abnormal data CULTURE WOUND W/ GRAM STAIN  Order: [id number]  Status: Final result     Visible to patient: Yes (not seen)     Next appt: Today at 11:00 AM in Infectious Diseases (Eder Middleton, A[facility]     Specimen Information: Wound   0 Result Notes      Component 1 mo ago   Significant Indicator POS Positive (POS)    Source WND    Site liver abscess    Culture Result - Abnormal     Gram Stain Result Many WBCs.   Many Gram positive cocci.    Culture Result  Abnormal   Streptococcus intermedius   Heavy growth     Resulting Agency M              Specimen Collected: 01/23/23  5:30 PM Last Resulted: 01/26/23  8:35 AM       Lab Flowsheet    Order Details    View Encounter    Lab and Collection Details    Routing    Result History     View Encounter Conversation           Result Care Coordination      Patient Communication     Add Comments   Not seen Back to Top           GRAM STAIN  Order: [id number] - Reflex for Order [id number]  Status: Final result     Visible to patient: Yes (not seen)     Next appt: Today at 11:00 AM in Infectious Diseases (Eder Middleton, A.P[facility]     Specimen Information:  Wound   0 Result Notes      Component 1 mo ago   Significant Indicator .    Source WND    Site liver abscess    Gram Stain Result Many WBCs.   Many Gram positive cocci.    Resulting Agency [facility]              Specimen Collected: 01/23/23  5:30 PM Last Resulted: 01/23/23 10:59 PM           Impression/Assessment      1. Hepatic abscess        2. Diabetes mellitus type 2 in obese (HCC)          clarence Yusuf is a 57 y.o.  with a history of diabetes.  Patient admission on 1/22/2023 due to liver abscess, patient was a transfer from [facility].  IR drain placement on 1/23/2023 with 65 mL beige blood mixed with pus.  Fluid cultures positive for Streptococcus intermedius.  Blood culture + strep. Patient was empirically started on Zosyn which was then changed to Unasyn.  TTE negative for vegetations.  Repeat CT scan from 1/28 Interval placement of a percutaneous drain into a right lobe hepatic abscess. Abscess has decreased in size with air/fluid component remaining in that area measuring approximately 3 x 1.5 cm in size. Plan was to DC home on Augmentin 875/125 mg twice daily. Repeat CT scan shows Percutaneous drainage catheter projects into the posterior dome of the right lobe of the liver. Small residual fluid collection remains measuring approximately 2.9 x 1.5 cm. No gas within the collection identified.  Being IR drain removal today 3/7/2023, will determine if he needs to stay on oral Augmentin based upon IR op report.  Most recent A1c from 1/22/2023 11.6.     1 week of IV antibiotics  5 weeks of oral Augmentin   PLAN:   - Continue with oral Augmentin 875/125 mg twice daily,  possible 1 additional week depending on IR op report for drain removal.  We will call patient after drain removal today to update.  He has enough Augmentin for an additional 10 days if needed.  - CBC/CMP ordered, can be drawn while either during drain removal or in OP Lab services   -Diabetes education provided.  He has a scheduled  follow-up with diabetic clinic.  Reports that his BS has been below 150 for the past 1 week has not required any insulin injections      Return visit: PRN. Follow up with primary care physician for chronic medical problems      I have performed a physical exam,  updated ROS and plan today. I have reviewed previous images, labs, and provider notes.      ROHITH Bliss.    All Patients should seek medical re-evaluation or report to the ER for new, increasing or worsening symptoms. In some circumstances medical conditions can change from the initial evaluation and may require emergent medical re-evaluation. This includes but is not limited to chest pain, shortness of breath, atypical abdominal pain, atypical headache, ALOC, fever >101, low blood pressure, high respiratory rate (above 30), low oxygen saturation (below 90%), acute delirium, abnormal bleeding, inability to tolerate any intake, weakness on one side of the body, any worsened or concerning conditions.    Please note that this dictation was created using voice recognition software. I have worked with technical experts from [facility], [facility] to optimize the interface.  I have made every reasonable attempt to correct obvious errors, but there may be errors of grammar and possibly content that I did not discover before finalizing the note.

## 2023-03-07 NOTE — PROGRESS NOTES
IR Nursing Note    Abscessogram of liver abscess with drain removal by Dr. Hull assisted by RT York, existing RUQ  access site.  Patient was consented by MD and confirmed by this RN. Procedure site was marked by MD and verified using imaging guidance.  Patient was placed in a supine position.  Patient was prepped and draped in a sterile fashion.  A gauze and  dressing placed over surgical site.

## 2023-03-08 ENCOUNTER — TELEPHONE (OUTPATIENT)
Dept: INFECTIOUS DISEASES | Facility: MEDICAL CENTER | Age: 58
End: 2023-03-08
Payer: COMMERCIAL

## 2023-03-08 LAB
ALBUMIN SERPL BCP-MCNC: 4.4 G/DL (ref 3.2–4.9)
ALBUMIN/GLOB SERPL: 1.3 G/DL
ALP SERPL-CCNC: 54 U/L (ref 30–99)
ALT SERPL-CCNC: 22 U/L (ref 2–50)
ANION GAP SERPL CALC-SCNC: 12 MMOL/L (ref 7–16)
AST SERPL-CCNC: 13 U/L (ref 12–45)
BILIRUB SERPL-MCNC: 0.5 MG/DL (ref 0.1–1.5)
BUN SERPL-MCNC: 14 MG/DL (ref 8–22)
CALCIUM ALBUM COR SERPL-MCNC: 9.2 MG/DL (ref 8.5–10.5)
CALCIUM SERPL-MCNC: 9.5 MG/DL (ref 8.5–10.5)
CHLORIDE SERPL-SCNC: 102 MMOL/L (ref 96–112)
CO2 SERPL-SCNC: 24 MMOL/L (ref 20–33)
CREAT SERPL-MCNC: 0.72 MG/DL (ref 0.5–1.4)
GFR SERPLBLD CREATININE-BSD FMLA CKD-EPI: 105 ML/MIN/1.73 M 2
GLOBULIN SER CALC-MCNC: 3.3 G/DL (ref 1.9–3.5)
GLUCOSE SERPL-MCNC: 118 MG/DL (ref 65–99)
POTASSIUM SERPL-SCNC: 4 MMOL/L (ref 3.6–5.5)
PROT SERPL-MCNC: 7.7 G/DL (ref 6–8.2)
SODIUM SERPL-SCNC: 138 MMOL/L (ref 135–145)

## 2023-03-08 NOTE — TELEPHONE ENCOUNTER
Called and discussed IR abscessogram and drain removal results with patient.  No remaining abscess visible per IR report.  Most recent labs from yesterday 3/7/2023 discussed with patient showing no signs of active infection.  Patient has 6 more days of remaining Augmentin before he runs out.  Recommended to complete last 6 days course of Augmentin (end date 3/14/23)  while site heals.  Education provided on worsening signs and symptoms of infection and when to report to ER.  Patient to follow-up with PCP.      Follow-up with ID clinic as needed

## 2025-01-29 ENCOUNTER — APPOINTMENT (OUTPATIENT)
Dept: CARDIOLOGY | Facility: MEDICAL CENTER | Age: 60
DRG: 322 | End: 2025-01-29
Attending: INTERNAL MEDICINE
Payer: COMMERCIAL

## 2025-01-29 ENCOUNTER — HOSPITAL ENCOUNTER (INPATIENT)
Facility: MEDICAL CENTER | Age: 60
LOS: 2 days | End: 2025-01-31
Attending: EMERGENCY MEDICINE | Admitting: INTERNAL MEDICINE
Payer: COMMERCIAL

## 2025-01-29 ENCOUNTER — APPOINTMENT (OUTPATIENT)
Dept: RADIOLOGY | Facility: MEDICAL CENTER | Age: 60
DRG: 322 | End: 2025-01-29
Attending: EMERGENCY MEDICINE
Payer: COMMERCIAL

## 2025-01-29 DIAGNOSIS — R07.9 ACUTE CHEST PAIN: ICD-10-CM

## 2025-01-29 DIAGNOSIS — I24.9 ACUTE CORONARY SYNDROME (HCC): ICD-10-CM

## 2025-01-29 PROBLEM — E66.812 CLASS 2 OBESITY DUE TO EXCESS CALORIES WITH BODY MASS INDEX (BMI) OF 38.0 TO 38.9 IN ADULT: Status: ACTIVE | Noted: 2025-01-29

## 2025-01-29 PROBLEM — E66.09 CLASS 2 OBESITY DUE TO EXCESS CALORIES WITH BODY MASS INDEX (BMI) OF 38.0 TO 38.9 IN ADULT: Status: ACTIVE | Noted: 2025-01-29

## 2025-01-29 PROBLEM — I50.9 NEW ONSET OF CONGESTIVE HEART FAILURE (HCC): Status: ACTIVE | Noted: 2025-01-29

## 2025-01-29 PROBLEM — E87.1 HYPONATREMIA: Status: ACTIVE | Noted: 2025-01-29

## 2025-01-29 PROBLEM — R73.9 HYPERGLYCEMIA: Status: ACTIVE | Noted: 2025-01-29

## 2025-01-29 PROBLEM — D72.829 LEUKOCYTOSIS: Status: ACTIVE | Noted: 2025-01-29

## 2025-01-29 PROBLEM — Z72.0 TOBACCO ABUSE: Status: ACTIVE | Noted: 2025-01-29

## 2025-01-29 PROBLEM — R79.89 ELEVATED TROPONIN: Status: ACTIVE | Noted: 2025-01-29

## 2025-01-29 LAB
ALBUMIN SERPL BCP-MCNC: 4.7 G/DL (ref 3.2–4.9)
ALBUMIN SERPL BCP-MCNC: 4.7 G/DL (ref 3.2–4.9)
ALBUMIN/GLOB SERPL: 1.6 G/DL
ALBUMIN/GLOB SERPL: 1.6 G/DL
ALP SERPL-CCNC: 60 U/L (ref 30–99)
ALP SERPL-CCNC: 60 U/L (ref 30–99)
ALT SERPL-CCNC: 19 U/L (ref 2–50)
ALT SERPL-CCNC: 19 U/L (ref 2–50)
ANION GAP SERPL CALC-SCNC: 16 MMOL/L (ref 7–16)
ANION GAP SERPL CALC-SCNC: 16 MMOL/L (ref 7–16)
APTT PPP: 28.3 SEC (ref 24.7–36)
APTT PPP: 28.3 SEC (ref 24.7–36)
AST SERPL-CCNC: 14 U/L (ref 12–45)
AST SERPL-CCNC: 14 U/L (ref 12–45)
BASOPHILS # BLD AUTO: 0.2 % (ref 0–1.8)
BASOPHILS # BLD AUTO: 0.2 % (ref 0–1.8)
BASOPHILS # BLD: 0.02 K/UL (ref 0–0.12)
BASOPHILS # BLD: 0.02 K/UL (ref 0–0.12)
BILIRUB SERPL-MCNC: 0.7 MG/DL (ref 0.1–1.5)
BILIRUB SERPL-MCNC: 0.7 MG/DL (ref 0.1–1.5)
BUN SERPL-MCNC: 15 MG/DL (ref 8–22)
BUN SERPL-MCNC: 15 MG/DL (ref 8–22)
CALCIUM ALBUM COR SERPL-MCNC: 9 MG/DL (ref 8.5–10.5)
CALCIUM ALBUM COR SERPL-MCNC: 9 MG/DL (ref 8.5–10.5)
CALCIUM SERPL-MCNC: 9.6 MG/DL (ref 8.5–10.5)
CALCIUM SERPL-MCNC: 9.6 MG/DL (ref 8.5–10.5)
CHLORIDE SERPL-SCNC: 98 MMOL/L (ref 96–112)
CHLORIDE SERPL-SCNC: 98 MMOL/L (ref 96–112)
CO2 SERPL-SCNC: 20 MMOL/L (ref 20–33)
CO2 SERPL-SCNC: 20 MMOL/L (ref 20–33)
CREAT SERPL-MCNC: 0.77 MG/DL (ref 0.5–1.4)
CREAT SERPL-MCNC: 0.77 MG/DL (ref 0.5–1.4)
EKG IMPRESSION: NORMAL
EKG IMPRESSION: NORMAL
EOSINOPHIL # BLD AUTO: 0.1 K/UL (ref 0–0.51)
EOSINOPHIL # BLD AUTO: 0.1 K/UL (ref 0–0.51)
EOSINOPHIL NFR BLD: 0.9 % (ref 0–6.9)
EOSINOPHIL NFR BLD: 0.9 % (ref 0–6.9)
ERYTHROCYTE [DISTWIDTH] IN BLOOD BY AUTOMATED COUNT: 46.4 FL (ref 35.9–50)
ERYTHROCYTE [DISTWIDTH] IN BLOOD BY AUTOMATED COUNT: 46.4 FL (ref 35.9–50)
GFR SERPLBLD CREATININE-BSD FMLA CKD-EPI: 102 ML/MIN/1.73 M 2
GFR SERPLBLD CREATININE-BSD FMLA CKD-EPI: 102 ML/MIN/1.73 M 2
GLOBULIN SER CALC-MCNC: 3 G/DL (ref 1.9–3.5)
GLOBULIN SER CALC-MCNC: 3 G/DL (ref 1.9–3.5)
GLUCOSE SERPL-MCNC: 151 MG/DL (ref 65–99)
GLUCOSE SERPL-MCNC: 151 MG/DL (ref 65–99)
HCT VFR BLD AUTO: 48.3 % (ref 42–52)
HCT VFR BLD AUTO: 48.3 % (ref 42–52)
HGB BLD-MCNC: 16.1 G/DL (ref 14–18)
HGB BLD-MCNC: 16.1 G/DL (ref 14–18)
HOLDING TUBE BB 8507: NORMAL
HOLDING TUBE BB 8507: NORMAL
IMM GRANULOCYTES # BLD AUTO: 0.08 K/UL (ref 0–0.11)
IMM GRANULOCYTES # BLD AUTO: 0.08 K/UL (ref 0–0.11)
IMM GRANULOCYTES NFR BLD AUTO: 0.7 % (ref 0–0.9)
IMM GRANULOCYTES NFR BLD AUTO: 0.7 % (ref 0–0.9)
INR PPP: 0.99 (ref 0.87–1.13)
INR PPP: 0.99 (ref 0.87–1.13)
LIPASE SERPL-CCNC: 23 U/L (ref 11–82)
LIPASE SERPL-CCNC: 23 U/L (ref 11–82)
LYMPHOCYTES # BLD AUTO: 1.28 K/UL (ref 1–4.8)
LYMPHOCYTES # BLD AUTO: 1.28 K/UL (ref 1–4.8)
LYMPHOCYTES NFR BLD: 11.1 % (ref 22–41)
LYMPHOCYTES NFR BLD: 11.1 % (ref 22–41)
MCH RBC QN AUTO: 30.3 PG (ref 27–33)
MCH RBC QN AUTO: 30.3 PG (ref 27–33)
MCHC RBC AUTO-ENTMCNC: 33.3 G/DL (ref 32.3–36.5)
MCHC RBC AUTO-ENTMCNC: 33.3 G/DL (ref 32.3–36.5)
MCV RBC AUTO: 90.8 FL (ref 81.4–97.8)
MCV RBC AUTO: 90.8 FL (ref 81.4–97.8)
MONOCYTES # BLD AUTO: 0.6 K/UL (ref 0–0.85)
MONOCYTES # BLD AUTO: 0.6 K/UL (ref 0–0.85)
MONOCYTES NFR BLD AUTO: 5.2 % (ref 0–13.4)
MONOCYTES NFR BLD AUTO: 5.2 % (ref 0–13.4)
NEUTROPHILS # BLD AUTO: 9.41 K/UL (ref 1.82–7.42)
NEUTROPHILS # BLD AUTO: 9.41 K/UL (ref 1.82–7.42)
NEUTROPHILS NFR BLD: 81.9 % (ref 44–72)
NEUTROPHILS NFR BLD: 81.9 % (ref 44–72)
NRBC # BLD AUTO: 0 K/UL
NRBC # BLD AUTO: 0 K/UL
NRBC BLD-RTO: 0 /100 WBC (ref 0–0.2)
NRBC BLD-RTO: 0 /100 WBC (ref 0–0.2)
PLATELET # BLD AUTO: 217 K/UL (ref 164–446)
PLATELET # BLD AUTO: 217 K/UL (ref 164–446)
PMV BLD AUTO: 10.7 FL (ref 9–12.9)
PMV BLD AUTO: 10.7 FL (ref 9–12.9)
POTASSIUM SERPL-SCNC: 4.4 MMOL/L (ref 3.6–5.5)
POTASSIUM SERPL-SCNC: 4.4 MMOL/L (ref 3.6–5.5)
PROT SERPL-MCNC: 7.7 G/DL (ref 6–8.2)
PROT SERPL-MCNC: 7.7 G/DL (ref 6–8.2)
PROTHROMBIN TIME: 13.1 SEC (ref 12–14.6)
PROTHROMBIN TIME: 13.1 SEC (ref 12–14.6)
RBC # BLD AUTO: 5.32 M/UL (ref 4.7–6.1)
RBC # BLD AUTO: 5.32 M/UL (ref 4.7–6.1)
SODIUM SERPL-SCNC: 134 MMOL/L (ref 135–145)
SODIUM SERPL-SCNC: 134 MMOL/L (ref 135–145)
TROPONIN T SERPL-MCNC: 63 NG/L (ref 6–19)
TROPONIN T SERPL-MCNC: 63 NG/L (ref 6–19)
UFH PPP CHRO-ACNC: <0.1 IU/ML
UFH PPP CHRO-ACNC: <0.1 IU/ML
WBC # BLD AUTO: 11.5 K/UL (ref 4.8–10.8)
WBC # BLD AUTO: 11.5 K/UL (ref 4.8–10.8)

## 2025-01-29 PROCEDURE — 85025 COMPLETE CBC W/AUTO DIFF WBC: CPT

## 2025-01-29 PROCEDURE — 700101 HCHG RX REV CODE 250

## 2025-01-29 PROCEDURE — 99223 1ST HOSP IP/OBS HIGH 75: CPT | Mod: 25 | Performed by: INTERNAL MEDICINE

## 2025-01-29 PROCEDURE — 99222 1ST HOSP IP/OBS MODERATE 55: CPT | Performed by: INTERNAL MEDICINE

## 2025-01-29 PROCEDURE — A9270 NON-COVERED ITEM OR SERVICE: HCPCS

## 2025-01-29 PROCEDURE — 99152 MOD SED SAME PHYS/QHP 5/>YRS: CPT | Performed by: INTERNAL MEDICINE

## 2025-01-29 PROCEDURE — 99291 CRITICAL CARE FIRST HOUR: CPT

## 2025-01-29 PROCEDURE — 80053 COMPREHEN METABOLIC PANEL: CPT

## 2025-01-29 PROCEDURE — 700117 HCHG RX CONTRAST REV CODE 255: Performed by: INTERNAL MEDICINE

## 2025-01-29 PROCEDURE — 93005 ELECTROCARDIOGRAM TRACING: CPT | Mod: TC | Performed by: INTERNAL MEDICINE

## 2025-01-29 PROCEDURE — 36415 COLL VENOUS BLD VENIPUNCTURE: CPT

## 2025-01-29 PROCEDURE — 700105 HCHG RX REV CODE 258: Performed by: INTERNAL MEDICINE

## 2025-01-29 PROCEDURE — 96375 TX/PRO/DX INJ NEW DRUG ADDON: CPT

## 2025-01-29 PROCEDURE — 700111 HCHG RX REV CODE 636 W/ 250 OVERRIDE (IP): Performed by: INTERNAL MEDICINE

## 2025-01-29 PROCEDURE — A9270 NON-COVERED ITEM OR SERVICE: HCPCS | Performed by: INTERNAL MEDICINE

## 2025-01-29 PROCEDURE — 700102 HCHG RX REV CODE 250 W/ 637 OVERRIDE(OP): Performed by: INTERNAL MEDICINE

## 2025-01-29 PROCEDURE — 85520 HEPARIN ASSAY: CPT

## 2025-01-29 PROCEDURE — 99153 MOD SED SAME PHYS/QHP EA: CPT

## 2025-01-29 PROCEDURE — 85610 PROTHROMBIN TIME: CPT

## 2025-01-29 PROCEDURE — 83690 ASSAY OF LIPASE: CPT

## 2025-01-29 PROCEDURE — 71045 X-RAY EXAM CHEST 1 VIEW: CPT

## 2025-01-29 PROCEDURE — B2151ZZ FLUOROSCOPY OF LEFT HEART USING LOW OSMOLAR CONTRAST: ICD-10-PCS | Performed by: INTERNAL MEDICINE

## 2025-01-29 PROCEDURE — 85730 THROMBOPLASTIN TIME PARTIAL: CPT

## 2025-01-29 PROCEDURE — 92978 ENDOLUMINL IVUS OCT C 1ST: CPT | Mod: 26,LD | Performed by: INTERNAL MEDICINE

## 2025-01-29 PROCEDURE — 770000 HCHG ROOM/CARE - INTERMEDIATE ICU *

## 2025-01-29 PROCEDURE — 700111 HCHG RX REV CODE 636 W/ 250 OVERRIDE (IP)

## 2025-01-29 PROCEDURE — 700102 HCHG RX REV CODE 250 W/ 637 OVERRIDE(OP)

## 2025-01-29 PROCEDURE — 84484 ASSAY OF TROPONIN QUANT: CPT

## 2025-01-29 PROCEDURE — 99406 BEHAV CHNG SMOKING 3-10 MIN: CPT | Performed by: INTERNAL MEDICINE

## 2025-01-29 PROCEDURE — 96365 THER/PROPH/DIAG IV INF INIT: CPT

## 2025-01-29 PROCEDURE — 027034Z DILATION OF CORONARY ARTERY, ONE ARTERY WITH DRUG-ELUTING INTRALUMINAL DEVICE, PERCUTANEOUS APPROACH: ICD-10-PCS | Performed by: INTERNAL MEDICINE

## 2025-01-29 PROCEDURE — 93005 ELECTROCARDIOGRAM TRACING: CPT | Mod: TC

## 2025-01-29 PROCEDURE — B240ZZ3 ULTRASONOGRAPHY OF SINGLE CORONARY ARTERY, INTRAVASCULAR: ICD-10-PCS | Performed by: INTERNAL MEDICINE

## 2025-01-29 PROCEDURE — B2111ZZ FLUOROSCOPY OF MULTIPLE CORONARY ARTERIES USING LOW OSMOLAR CONTRAST: ICD-10-PCS | Performed by: INTERNAL MEDICINE

## 2025-01-29 PROCEDURE — 4A023N7 MEASUREMENT OF CARDIAC SAMPLING AND PRESSURE, LEFT HEART, PERCUTANEOUS APPROACH: ICD-10-PCS | Performed by: INTERNAL MEDICINE

## 2025-01-29 PROCEDURE — 92928 PRQ TCAT PLMT NTRAC ST 1 LES: CPT | Mod: LD | Performed by: INTERNAL MEDICINE

## 2025-01-29 PROCEDURE — 700111 HCHG RX REV CODE 636 W/ 250 OVERRIDE (IP): Mod: JZ

## 2025-01-29 PROCEDURE — 93458 L HRT ARTERY/VENTRICLE ANGIO: CPT | Mod: 26,59 | Performed by: INTERNAL MEDICINE

## 2025-01-29 RX ORDER — HEPARIN SODIUM 1000 [USP'U]/ML
4000 INJECTION, SOLUTION INTRAVENOUS; SUBCUTANEOUS ONCE
Status: COMPLETED | OUTPATIENT
Start: 2025-01-29 | End: 2025-01-29

## 2025-01-29 RX ORDER — HEPARIN SODIUM 1000 [USP'U]/ML
INJECTION, SOLUTION INTRAVENOUS; SUBCUTANEOUS
Status: COMPLETED
Start: 2025-01-29 | End: 2025-01-29

## 2025-01-29 RX ORDER — PRASUGREL 10 MG/1
60 TABLET, FILM COATED ORAL ONCE
Status: COMPLETED | OUTPATIENT
Start: 2025-01-29 | End: 2025-01-29

## 2025-01-29 RX ORDER — POLYETHYLENE GLYCOL 3350 17 G/17G
1 POWDER, FOR SOLUTION ORAL
Status: DISCONTINUED | OUTPATIENT
Start: 2025-01-29 | End: 2025-01-31 | Stop reason: HOSPADM

## 2025-01-29 RX ORDER — LIDOCAINE HYDROCHLORIDE 20 MG/ML
INJECTION, SOLUTION INFILTRATION; PERINEURAL
Status: COMPLETED
Start: 2025-01-29 | End: 2025-01-29

## 2025-01-29 RX ORDER — MIDAZOLAM HYDROCHLORIDE 1 MG/ML
INJECTION INTRAMUSCULAR; INTRAVENOUS
Status: COMPLETED
Start: 2025-01-29 | End: 2025-01-29

## 2025-01-29 RX ORDER — HEPARIN SODIUM 1000 [USP'U]/ML
2000 INJECTION, SOLUTION INTRAVENOUS; SUBCUTANEOUS PRN
Status: DISCONTINUED | OUTPATIENT
Start: 2025-01-29 | End: 2025-01-29

## 2025-01-29 RX ORDER — ASPIRIN 81 MG/1
81 TABLET ORAL DAILY
Status: DISCONTINUED | OUTPATIENT
Start: 2025-01-29 | End: 2025-01-31 | Stop reason: HOSPADM

## 2025-01-29 RX ORDER — HEPARIN SODIUM 200 [USP'U]/100ML
INJECTION, SOLUTION INTRAVENOUS
Status: COMPLETED
Start: 2025-01-29 | End: 2025-01-29

## 2025-01-29 RX ORDER — SODIUM CHLORIDE 9 MG/ML
1.5 INJECTION, SOLUTION INTRAVENOUS CONTINUOUS
Status: ACTIVE | OUTPATIENT
Start: 2025-01-29 | End: 2025-01-29

## 2025-01-29 RX ORDER — ACETAMINOPHEN 325 MG/1
650 TABLET ORAL EVERY 6 HOURS PRN
Status: DISCONTINUED | OUTPATIENT
Start: 2025-01-29 | End: 2025-01-31 | Stop reason: HOSPADM

## 2025-01-29 RX ORDER — AMOXICILLIN 250 MG
2 CAPSULE ORAL EVERY EVENING
Status: DISCONTINUED | OUTPATIENT
Start: 2025-01-30 | End: 2025-01-31 | Stop reason: HOSPADM

## 2025-01-29 RX ORDER — NICOTINE 21 MG/24HR
21 PATCH, TRANSDERMAL 24 HOURS TRANSDERMAL
Status: DISCONTINUED | OUTPATIENT
Start: 2025-01-29 | End: 2025-01-31 | Stop reason: HOSPADM

## 2025-01-29 RX ORDER — METOPROLOL SUCCINATE 25 MG/1
25 TABLET, EXTENDED RELEASE ORAL
Status: DISCONTINUED | OUTPATIENT
Start: 2025-01-29 | End: 2025-01-31 | Stop reason: HOSPADM

## 2025-01-29 RX ORDER — ENOXAPARIN SODIUM 100 MG/ML
40 INJECTION SUBCUTANEOUS DAILY
Status: DISCONTINUED | OUTPATIENT
Start: 2025-01-30 | End: 2025-01-31 | Stop reason: HOSPADM

## 2025-01-29 RX ORDER — PRASUGREL 10 MG/1
TABLET, FILM COATED ORAL
Status: COMPLETED
Start: 2025-01-29 | End: 2025-01-29

## 2025-01-29 RX ORDER — HEPARIN SODIUM 5000 [USP'U]/100ML
0-30 INJECTION, SOLUTION INTRAVENOUS CONTINUOUS
Status: DISCONTINUED | OUTPATIENT
Start: 2025-01-29 | End: 2025-01-29

## 2025-01-29 RX ORDER — BIVALIRUDIN 250 MG/5ML
INJECTION, POWDER, LYOPHILIZED, FOR SOLUTION INTRAVENOUS
Status: COMPLETED
Start: 2025-01-29 | End: 2025-01-29

## 2025-01-29 RX ORDER — LOSARTAN POTASSIUM 25 MG/1
25 TABLET ORAL
Status: DISCONTINUED | OUTPATIENT
Start: 2025-01-30 | End: 2025-01-31 | Stop reason: HOSPADM

## 2025-01-29 RX ORDER — ATORVASTATIN CALCIUM 80 MG/1
80 TABLET, FILM COATED ORAL
Status: DISCONTINUED | OUTPATIENT
Start: 2025-01-29 | End: 2025-01-31 | Stop reason: HOSPADM

## 2025-01-29 RX ORDER — VERAPAMIL HYDROCHLORIDE 2.5 MG/ML
INJECTION, SOLUTION INTRAVENOUS
Status: COMPLETED
Start: 2025-01-29 | End: 2025-01-29

## 2025-01-29 RX ORDER — PRASUGREL 10 MG/1
10 TABLET, FILM COATED ORAL DAILY
Status: DISCONTINUED | OUTPATIENT
Start: 2025-01-30 | End: 2025-01-31 | Stop reason: HOSPADM

## 2025-01-29 RX ADMIN — NICOTINE TRANSDERMAL SYSTEM 21 MG: 21 PATCH, EXTENDED RELEASE TRANSDERMAL at 17:06

## 2025-01-29 RX ADMIN — LIDOCAINE HYDROCHLORIDE: 20 INJECTION, SOLUTION INFILTRATION; PERINEURAL at 14:09

## 2025-01-29 RX ADMIN — HEPARIN SODIUM 4000 UNITS: 1000 INJECTION, SOLUTION INTRAVENOUS; SUBCUTANEOUS at 13:29

## 2025-01-29 RX ADMIN — SODIUM CHLORIDE 1.5 ML/KG/HR: 9 INJECTION, SOLUTION INTRAVENOUS at 15:50

## 2025-01-29 RX ADMIN — HEPARIN SODIUM: 1000 INJECTION, SOLUTION INTRAVENOUS; SUBCUTANEOUS at 14:10

## 2025-01-29 RX ADMIN — FENTANYL CITRATE 25 MCG: 50 INJECTION, SOLUTION INTRAMUSCULAR; INTRAVENOUS at 14:51

## 2025-01-29 RX ADMIN — FENTANYL CITRATE 100 MCG: 50 INJECTION, SOLUTION INTRAMUSCULAR; INTRAVENOUS at 14:47

## 2025-01-29 RX ADMIN — IOHEXOL 120 ML: 350 INJECTION, SOLUTION INTRAVENOUS at 14:52

## 2025-01-29 RX ADMIN — PRASUGREL 60 MG: 10 TABLET, FILM COATED ORAL at 14:56

## 2025-01-29 RX ADMIN — METOPROLOL SUCCINATE 25 MG: 25 TABLET, EXTENDED RELEASE ORAL at 18:45

## 2025-01-29 RX ADMIN — ATORVASTATIN CALCIUM 80 MG: 80 TABLET, FILM COATED ORAL at 20:46

## 2025-01-29 RX ADMIN — HEPARIN SODIUM 2000 UNITS: 200 INJECTION, SOLUTION INTRAVENOUS at 14:09

## 2025-01-29 RX ADMIN — VERAPAMIL HYDROCHLORIDE 2.5 MG: 2.5 INJECTION, SOLUTION INTRAVENOUS at 14:09

## 2025-01-29 RX ADMIN — MIDAZOLAM HYDROCHLORIDE 2 MG: 1 INJECTION, SOLUTION INTRAMUSCULAR; INTRAVENOUS at 14:47

## 2025-01-29 RX ADMIN — MIDAZOLAM HYDROCHLORIDE 1 MG: 1 INJECTION, SOLUTION INTRAMUSCULAR; INTRAVENOUS at 14:51

## 2025-01-29 RX ADMIN — BIVALIRUDIN 1.75 MG/KG/HR: 250 INJECTION, POWDER, LYOPHILIZED, FOR SOLUTION INTRAVENOUS at 14:36

## 2025-01-29 RX ADMIN — HEPARIN SODIUM 12 UNITS/KG/HR: 5000 INJECTION, SOLUTION INTRAVENOUS at 13:33

## 2025-01-29 RX ADMIN — NITROGLYCERIN 10 ML: 20 INJECTION INTRAVENOUS at 14:09

## 2025-01-29 RX ADMIN — BIVALIRUDIN 0.2 MG/KG/HR: 250 INJECTION, POWDER, LYOPHILIZED, FOR SOLUTION INTRAVENOUS at 14:55

## 2025-01-29 RX ADMIN — NICOTINE POLACRILEX 2 MG: 2 GUM, CHEWING BUCCAL at 20:46

## 2025-01-29 ASSESSMENT — ENCOUNTER SYMPTOMS
FOCAL WEAKNESS: 0
DIARRHEA: 0
FEVER: 0
NAUSEA: 0
PHOTOPHOBIA: 0
HEADACHES: 0
SENSORY CHANGE: 0
HEMATOCHEZIA: 0
CONSTIPATION: 0
DIZZINESS: 0
WEIGHT LOSS: 0
SHORTNESS OF BREATH: 0
DOUBLE VISION: 0
BLURRED VISION: 0
MYALGIAS: 0
CHILLS: 0
EYE PAIN: 0
BACK PAIN: 0
SPUTUM PRODUCTION: 0
ORTHOPNEA: 0
HALLUCINATIONS: 0
TREMORS: 0
HEMOPTYSIS: 0
TINGLING: 0
SPEECH CHANGE: 0
NECK PAIN: 0
WHEEZING: 0
DIAPHORESIS: 0
COUGH: 0
ABDOMINAL PAIN: 0
VOMITING: 0
PALPITATIONS: 0

## 2025-01-29 ASSESSMENT — PAIN DESCRIPTION - PAIN TYPE
TYPE: ACUTE PAIN

## 2025-01-29 ASSESSMENT — FIBROSIS 4 INDEX: FIB4 SCORE: 0.9

## 2025-01-29 ASSESSMENT — LIFESTYLE VARIABLES: SUBSTANCE_ABUSE: 0

## 2025-01-29 NOTE — DISCHARGE PLANNING
"STEMI Response    Referral: Code STEMI Response    Intervention: SW responded to Code STEMT.  Pt was BIB [facility] for chest pain.  Pt was alert upon arrival.  Pts name is Thom North (: 1965).  SW obtained the following pt information: Per EMS Pt was at work ([employer] in [place], [place]) when the Pt reports he had a sudden onset of chest pressure.  SW was informed by the Pt that his Brother has already been notified and is [facility] way to [facility] now.       Brother: Dex \"Ruddy\" Mary Anne (patient unsure at this time)    Plan: SW will continue to monitor and assist if needs arise.     "

## 2025-01-29 NOTE — CONSULTS
Cardiology Initial Consult Note    Date of note:    1/29/2025      Consulting Physician: David Culp M.D.    Name:   Thom North   YOB: 1965  Age:   59 y.o.  male   MRN:   [id number]    Assessment/Recommendations:  1.  Aborted ST elevation myocardial infarction with sudden onset of chest pressure at 1130 this morning and new left bundle branch block compared to prior ECG of 1/23/2023.  However, patient is currently now chest pain-free suggesting he probably did develop some reflow.  Therefore we will cancel ST elevation MI at this time.  However he still should proceed to cardiac cath lab today.  Discussed the above with the patient.The risks, benefits, and alternatives to coronary angiography with possible percutaneous coronary intervention and IV sedation were discussed in great detail. Specific risks mentioned include bleeding that may require blood transfusion, kidney damage from IV contrast allergy, infection, allergic reaction, arterial damage that may require intervention or surgery, cardiac perforation with possible tamponade requiring denilson-cardiocentesis or possible open heart surgery. We also discussed in great detail a stent is placed, they will have to be on two antiplatelet agents (blood thinners) for up to 1 year.  Verified with patient no planned surgeries/procedures are planned within the next year.  Verified with patient no recent bleeding.  In addition, we discussed that 10% of patients will experience small to moderate bruising at the side of the arterial puncture. Lastly the risks of heart attack, stroke, and death were discussed; the risks of major complications such as heart attack or stroke caused by the angiogram is less than 1%; the risk of death is approximately 1 in 1000. The patient verbalized understanding of these potential complications and wishes to proceed with this procedure.  -Heparin bolus and drip  - Patient already received aspirin  - Will likely  need to be started on the usual beta-blocker, statin, and likely ACE or ARB  - Will need echo post cath to assess LVEF  -Should have cardiac catheterization today earlier than later    2.  Diabetes mellitus.  Patient has prior hemoglobin with A1c that was over 11.  Will likely need to start on medications.    Disposition: 2 days    Reason for Consultation: ST elevation MI    HPI:  Thom North is a 59 y.o. male with prediabetes, no other significant past medical history, who was working, taking, when he had sudden onset of chest tightness at 1130 this morning.  The chest tightness was not resolving and therefore he called for help.  He was given 324 of aspirin and 2 doses of nitro and 1 dose of Zofran on route via [facility].  Upon arrival here, he reports he is pain-free without any chest pain.  He is quite cold and shivering but otherwise denies any other symptoms.    Denies any recent illness, no fevers no chills.  He normally does not take any medications.  He does not smoke but does chew tobacco.  Rare alcohol.    His brother in his 50s has also had a heart attack with stent placement.      He denies any allergies to any medication nor contrast.    In review of his other chart, he was in the hospital at [facility] in January 2023 for perihepatic abscess which was drained and placed on antibiotics.  During that admission, they found him to be a diabetic with a hemoglobin A1c of 11.6.  He was started on insulin.  He reports he is currently not taking any medications.    Problem list:  Active Problems:    * No active hospital problems. *  Resolved Problems:    * No resolved hospital problems. *      No past medical history on file.    No past surgical history on file.      (Not in a hospital admission)    Current Facility-Administered Medications   Medication Dose Route Frequency Provider Last Rate Last Admin    heparin infusion 25,000 units in 500 mL 0.45% NACL  0-30 Units/kg/hr (Adjusted) Intravenous  Continuous Bindu Del Real M.D.        heparin injection 2,000 Units  2,000 Units Intravenous PRN Bindu Del Real M.D.         No current outpatient medications on file.       Not on File    No family history on file.    Social History     Socioeconomic History    Marital status: Not on file     Spouse name: Not on file    Number of children: Not on file    Years of education: Not on file    Highest education level: Not on file   Occupational History    Not on file   Tobacco Use    Smoking status: Not on file    Smokeless tobacco: Not on file   Substance and Sexual Activity    Alcohol use: Not on file    Drug use: Not on file    Sexual activity: Not on file   Other Topics Concern    Not on file   Social History Narrative    Not on file     Social Drivers of Health     Financial Resource Strain: Not on file   Food Insecurity: Not on file   Transportation Needs: Not on file   Physical Activity: Not on file   Stress: Not on file   Social Connections: Not on file   Intimate Partner Violence: Not on file   Housing Stability: Not on file       No intake or output data in the 24 hours ending 01/29/25 1333      Review of Systems   Constitutional: Negative for diaphoresis and fever.   Respiratory:  Negative for cough, hemoptysis and wheezing.    Gastrointestinal:  Negative for hematochezia and melena.   Genitourinary:  Negative for hematuria.        Physical Exam  Body mass index is 39.33 kg/m².  Ht 1.829 m (6')   Wt (!) 132 kg (290 lb)   Vitals:    01/29/25 1316   Weight: (!) 132 kg (290 lb)   Height: 1.829 m (6')     Oxygen Therapy:       Physical Exam  Constitutional:       Appearance: Normal appearance. He is obese.   Eyes:      Extraocular Movements: Extraocular movements intact.      Conjunctiva/sclera: Conjunctivae normal.   Neck:      Vascular: No carotid bruit or JVD.   Cardiovascular:      Rate and Rhythm: Normal rate and regular rhythm.      Pulses:           Radial pulses are 1+ on the right side and 1+ on the  "left side.        Posterior tibial pulses are 1+ on the right side and 1+ on the left side.      Heart sounds: Normal heart sounds. No murmur heard.     No friction rub. No gallop.   Pulmonary:      Effort: Pulmonary effort is normal. No respiratory distress.      Breath sounds: Normal breath sounds. No wheezing or rales.   Abdominal:      Palpations: Abdomen is soft.      Comments: Obese   Musculoskeletal:      Right lower leg: No edema.      Left lower leg: No edema.   Skin:     General: Skin is warm and dry.   Neurological:      Mental Status: He is alert and oriented to person, place, and time. Mental status is at baseline.   Psychiatric:         Mood and Affect: Mood normal.          Labs (personally reviewed and notable for):   No results found for: \"SODIUM\", \"POTASSIUM\", \"CHLORIDE\", \"CO2\", \"GLUCOSE\", \"BUN\", \"CREATININE\", \"BUNCREATRAT\", \"GLOMRATE\"   No results found for: \"WBC\", \"RBC\", \"HEMOGLOBIN\", \"HEMATOCRIT\", \"MCV\", \"MCH\", \"MCHC\", \"MPV\", \"NEUTSPOLYS\", \"LYMPHOCYTES\", \"MONOCYTES\", \"EOSINOPHILS\", \"BASOPHILS\", \"HYPOCHROMIA\", \"ANISOCYTOSIS\", \"INR\"   No results found for: \"CHOLSTRLTOT\", \"LDL\", \"HDL\", \"TRIGLYCERIDE\"    No results found for: \"TROPONINT\"  No results found for: \"NTPROBNP\"  No results found for: \"HBA1C\"      Cardiac Imaging and Procedures Review (personally reviewed and notable for):    EKG dated 1/29/2025: Sinus rhythm, left bundle branch block, compared to prior ECG of 1/22/2023, left bundle branch block is new.    Echo dated 1/23 8/23: Normal LV RV systolic function.  No significant valvular stenosis or regurgitation.    Radiology test Review:  DX-CHEST-LIMITED (1 VIEW)    (Results Pending)     Thank you for allowing me to participate in the care of this patient.  Please contact me with any questions.      Bindu Del Real M.D.  Cardiologist, [facility] [place]     This note was generated using voice recognition software which has a small chance of producing errors of grammar and " possibly content. I have made every reasonable attempt to find and correct any obvious errors, but expect that some may not be found prior to finalization of this note.

## 2025-01-29 NOTE — PROCEDURES
Cardiac Catheterization report    1/29/2025  2:59 PM    REFERRING MD: Dr. Del Real    INDICATION/PREOPERATIVE DIAGNOSIS:  Acute coronary syndrome  New left bundle branch block    POSTOPERATIVE DIAGNOSIS:  80% acute proximal/ostial LAD stenosis, culprit  Mild nonobstructive CAD otherwise  LVEF 35%, LVEDP 18 mmHg  Successful IVUS guided PCI of ostial LAD, excellent result    RECOMMENDATIONS:  Guideline directed medical therapy   Cardiovascular Risk factor modification  Dual antiplatelet therapy for minimum 1 year    HISTORY:  Patient arrived as a STEMI alert through the emergency department with a finding of new left bundle branch block.  Symptoms were stuttering and STEMI alert was canceled as he had resolution of symptoms with medical therapy.  Subsequently he was emergently brought to the catheterization laboratory shortly after arrival ahead of other scheduled inpatients for urgent/emergent coronary angiography given the severity of his symptoms and elevated troponin.  He arrives in the procedure area in the emergency fashion.      PROCEDURES PERFORMED:  Coronary arteriograms  Left heart catheterization and Left ventriculogram   Supervision moderate sedation  Percutaneous coronary intervention  IVUS      FINDINGS:  I.  HEMODYNAMICS   Ao: 96/72 mmHg   LEDP: 18 mmHg   Gradient on LV pullback: No    II. CORONARY ANGIOGRAPHY:  Left main coronary artery: Large-caliber bifurcating no CAD  Left anterior descending artery: Very large caliber wrapping around the apex.  Gives rise to a complex bifurcating large first diagonal system.  Proximal to the diagonal system is a moderately calcified acute appearing hazy eccentric 80 to 90% proximal and ostial stenosis.  Post PCI there is 0% residual stenosis and SABIHA-3 flow.  Left circumflex coronary artery: Large-caliber vessel with mild nonobstructive CAD.  Right coronary artery: Large-caliber vessel with mild nonobstructive CAD.    III.  LEFT VENTRICULOGRAM:  LVEF AGUILAR PROJECTION:  "35% with anterior and anteroapical akinesis      Procedure details:  The risks and benefits of cardiac catheterization and possible intervention were explained to the patient including death, heart attack, stroke, and emergency surgery.  The patient was brought to the cardiac catheterization lab in the emergency fashion where the right wrist was prepped and draped in the usual manner for cardiac catheterization.  The area was anesthetized with lidocaine and a 5/6 FR sheath was inserted into the right radial artery without difficulty. A Sg catheter was advanced to the ostia of the Left coronary artery and arteriograms were recorded.   A Sg catheter was advanced to the ostia of the right coronary artery and arteriograms were recorded. Aortic valve was crossed using Sg catheter for left heart catheterization was performed.     Description of PCI:  The decision was made to intervene on the culprit coronary artery.  Anticoagulation was initiated as below.  The diagnostic catheter was exchanged over a wire for an 6 Portuguese EBU 3.5 guide seated appropriately. A 0.014\" mm  Runthrough was advanced into the artery and use to cross the lesion. A 3.0 mm balloon was used to predilate the lesion.  [place]ora intravascular sound catheter Soto's lesion and a pullback recorded for lesion assessment and characteristics.  A 4.0 x 16 mm Synergy Megatron FREDY was then positioned and deployed at nominal pressure in the proximal and ostial segment. Following this a 4.0 mm noncompliant balloon to high-pressure was used to post dilate the stent.  IVUS was repeated showing excellent stent expansion and apposition.  There was an excellent angiographic result with SABIHA-3 flow and no residual stenosis in the stented segment. All catheters and guidewires were removed.    At the completion of the case the sheath was removed and hemostasis achieved utilizing a radial compression band patient left Cath Lab in stable condition and there were no " apparent complications.    Anticoagulant: Angiomax  Antiplatelet: Effient (prasugrel)  EBL <25 cc  Complications: none  Specimens: none  Contrast: 120cc    Complications:  None apparent    Sedation time:  Moderate sedation directly monitored by me during the case while supervising the administration of the sedation medication by an independent trained RN to assist in the monitoring of the patient's level of consciousness and physiological status. I, the supervising physician was present the entire time from beginning of medication administration until the end of the procedure from 1423 until 1450. For detailed administration records please see the moderate sedation documentation in the media tab.    Following the procedure I discussed the results with the patient.  Attempted to contact patient's family members at numbers listed in chart which were disconnected.  No family members were in attendance to discuss with after the case.    Michael Chiu MD, FACC, [facility]

## 2025-01-29 NOTE — ED TRIAGE NOTES
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 Pt found to have elevated in anterior and inferior leads. Flight gave pt 324 of ASA, SL nitro x 2 and 4 mg of zofran in route. Pt arrives to ER with no chest pain at this time.              Pt activated as STEMI for above complaint. Pt A+Ox4, no hx of cardiac issues.       /81   Pulse 81   Resp 18   Ht 1.829 m (6')   Wt (!) 132 kg (290 lb)   SpO2 96%     "

## 2025-01-29 NOTE — PROGRESS NOTES
Patient arrived as code STEMI. He received 324 of aspirin, two doses of nitro, and one dose of zofran en route. STEMI cancelled at 1320 by Dr. Del Real with plan to go to cath later today.

## 2025-01-29 NOTE — ED PROVIDER NOTES
"ED Provider Note    CHIEF COMPLAINT  Chief Complaint   Patient presents with    Chest Pain     Pt BIB care flight for sudden onset of 8/10 chest \"pressure\" this morning while pt was digging a hole at work. Pt found to have elevated in anterior and inferior leads. Flight gave pt 324 of ASA, SL nitro x 2 and 4 mg of zofran in route. Pt arrives to ER with no chest pain at this time.            EXTERNAL RECORDS REVIEWED  Other reviewed an old EKG that did show an intermittent right bundle branch block but no left bundle branch block present    HPI/ROS    Thom North is a 59 y.o. male who presents with chest pain.  The patient presents via care flight after he had the sudden onset of chest pressure this morning.  The patient was digging a hole at work.  He had anterior left chest pressure with some radiation to his back.  He did have some associated dyspnea but no nausea and or diaphoresis.  Of note the patient did receive Zofran per air care prophylactically.  He also received aspirin as well as 2 doses of sublingual nitroglycerin and he has been pain-free since that time.  He is found to have possible ST elevation in the inferior leads and he was transferred as a possible ST elevation myocardial infarction.  The patient does have a strong family history of cardiac disease.  He also has borderline diabetes.    PAST MEDICAL HISTORY       SURGICAL HISTORY  patient denies any surgical history    FAMILY HISTORY  No family history on file.    SOCIAL HISTORY  Social History     Tobacco Use    Smoking status: Not on file    Smokeless tobacco: Not on file   Substance and Sexual Activity    Alcohol use: Not on file    Drug use: Not on file    Sexual activity: Not on file       CURRENT MEDICATIONS  Home Medications       Reviewed by Alexandrea Amezcua R.N. (Registered Nurse) on 01/29/25 at 1340  Med List Status: Partial     Medication Last Dose Status        Patient Rodolfo Taking any Medications                     "     Audit from Redirected Encounters    **Home medications have not yet been reviewed for this encounter**         ALLERGIES  No Known Allergies    PHYSICAL EXAM  VITAL SIGNS: /81   Pulse 81   Resp 18   Ht 1.829 m (6')   Wt (!) 132 kg (290 lb)   SpO2 96%   BMI 39.33 kg/m²    General The patient is resting comfortably in no obvious distress    HEENT unremarkable    Pulmonary the patient's lungs are clear to auscultation bilaterally    Cardiovascular S1-S2 with a regular rate and rhythm    GI abdomen soft    Skin no rashes, pallor, no jaundice    Extremities no distal edema    Neurologic examination is grossly intact    EKG/LABS  Results for orders placed or performed during the hospital encounter of 25   aPTT    Collection Time: 25  1:17 PM   Result Value Ref Range    APTT 28.3 24.7 - 36.0 sec   Prothrombin Time    Collection Time: 25  1:17 PM   Result Value Ref Range    PT 13.1 12.0 - 14.6 sec    INR 0.99 0.87 - 1.13   Heparin Xa (Unfractionated)    Collection Time: 25  1:17 PM   Result Value Ref Range    Heparin Xa (UFH) <0.10 IU/mL   EKG (NOW)    Collection Time: 25  2:04 PM   Result Value Ref Range    Report       [facility] Emergency Dept.    Test Date:  2025  Pt Name:    J LUIS EDWARD                Department: ER  MRN:        [id number]                      Room:       [facility]  Gender:     Male                         Technician:  :        1965                   Requested By:OPHELIA SCANLON  Order #:    [id number]                    Reading MD: OPHELIA SCANLON MD    Measurements  Intervals                                Axis  Rate:       94                           P:          28  CO:         182                          QRS:        -71  QRSD:       138                          T:          87  QT:         384  QTc:        481    Interpretive Statements  twelve-lead EKG shows a sinus rhythm with a ventricular rate of 94, QRS  shows  evidence of left ventricular hypertrophy, some J-point elevation anteriorly  and the patient also has acute left bundle branch block with T wave inversion  laterally and aVL  Electronically Signed On 01- 14:04:15 PST by GALA CULP MD         I have independently interpreted this EKG    RADIOLOGY/PROCEDURES     Radiologist interpretation:  DX-CHEST-LIMITED (1 VIEW)   Final Result      1.  Cardiomegaly. No overt failure or pneumonia.      CL-LEFT HEART CATHETERIZATION WITH POSSIBLE INTERVENTION    (Results Pending)       COURSE & MEDICAL DECISION MAKING    This a 59-year-old male who presents via air care as a suspected ST elevation myocardial infarction.  The patient was treated aggressively and route with aspirin and nitroglycerin and he has been pain-free which is comforting.  His EKG does show an acute left bundle branch block which is concerning for potential acute coronary syndrome.  Therefore cardiology has opted for heparinization and the patient be taken to the Cath Lab not emergently.  Otherwise chest x-ray does not show any evidence of a pulmonary source likely causes chest pain.  There is some cardiomegaly appreciated.  On repeat exam the patient continues to be pain-free.    1405 patient continues to be pain-free.  I will contact the intensivist for admission to the [facility] and the patient will go for catheterization.    FINAL DIAGNOSIS  Chest pain    Disposition  The patient is currently hemodynamically stable.     Electronically signed by: David Culp M.D., 1/29/2025 1:43 PM

## 2025-01-30 ENCOUNTER — APPOINTMENT (OUTPATIENT)
Dept: CARDIOLOGY | Facility: MEDICAL CENTER | Age: 60
End: 2025-01-30
Attending: INTERNAL MEDICINE
Payer: COMMERCIAL

## 2025-01-30 LAB
ANION GAP SERPL CALC-SCNC: 13 MMOL/L (ref 7–16)
ANION GAP SERPL CALC-SCNC: 13 MMOL/L (ref 7–16)
BUN SERPL-MCNC: 17 MG/DL (ref 8–22)
BUN SERPL-MCNC: 17 MG/DL (ref 8–22)
CALCIUM SERPL-MCNC: 9.3 MG/DL (ref 8.5–10.5)
CALCIUM SERPL-MCNC: 9.3 MG/DL (ref 8.5–10.5)
CHLORIDE SERPL-SCNC: 104 MMOL/L (ref 96–112)
CHLORIDE SERPL-SCNC: 104 MMOL/L (ref 96–112)
CHOLEST SERPL-MCNC: 214 MG/DL (ref 100–199)
CHOLEST SERPL-MCNC: 214 MG/DL (ref 100–199)
CO2 SERPL-SCNC: 20 MMOL/L (ref 20–33)
CO2 SERPL-SCNC: 20 MMOL/L (ref 20–33)
CREAT SERPL-MCNC: 0.78 MG/DL (ref 0.5–1.4)
CREAT SERPL-MCNC: 0.78 MG/DL (ref 0.5–1.4)
EKG IMPRESSION: NORMAL
EKG IMPRESSION: NORMAL
ERYTHROCYTE [DISTWIDTH] IN BLOOD BY AUTOMATED COUNT: 47.3 FL (ref 35.9–50)
ERYTHROCYTE [DISTWIDTH] IN BLOOD BY AUTOMATED COUNT: 47.3 FL (ref 35.9–50)
EST. AVERAGE GLUCOSE BLD GHB EST-MCNC: 214 MG/DL
EST. AVERAGE GLUCOSE BLD GHB EST-MCNC: 214 MG/DL
GFR SERPLBLD CREATININE-BSD FMLA CKD-EPI: 101 ML/MIN/1.73 M 2
GFR SERPLBLD CREATININE-BSD FMLA CKD-EPI: 101 ML/MIN/1.73 M 2
GLUCOSE SERPL-MCNC: 120 MG/DL (ref 65–99)
GLUCOSE SERPL-MCNC: 120 MG/DL (ref 65–99)
HBA1C MFR BLD: 9.1 % (ref 4–5.6)
HBA1C MFR BLD: 9.1 % (ref 4–5.6)
HCT VFR BLD AUTO: 43.7 % (ref 42–52)
HCT VFR BLD AUTO: 43.7 % (ref 42–52)
HDLC SERPL-MCNC: 27 MG/DL
HDLC SERPL-MCNC: 27 MG/DL
HGB BLD-MCNC: 14.6 G/DL (ref 14–18)
HGB BLD-MCNC: 14.6 G/DL (ref 14–18)
LDLC SERPL CALC-MCNC: 142 MG/DL
LDLC SERPL CALC-MCNC: 142 MG/DL
LV EJECT FRACT MOD 2C 99903: 61.8
LV EJECT FRACT MOD 2C 99903: 61.8
LV EJECT FRACT MOD 4C 99902: 61.24
LV EJECT FRACT MOD 4C 99902: 61.24
LV EJECT FRACT MOD BP 99901: 61.38
LV EJECT FRACT MOD BP 99901: 61.38
MAGNESIUM SERPL-MCNC: 2.2 MG/DL (ref 1.5–2.5)
MAGNESIUM SERPL-MCNC: 2.2 MG/DL (ref 1.5–2.5)
MCH RBC QN AUTO: 30.4 PG (ref 27–33)
MCH RBC QN AUTO: 30.4 PG (ref 27–33)
MCHC RBC AUTO-ENTMCNC: 33.4 G/DL (ref 32.3–36.5)
MCHC RBC AUTO-ENTMCNC: 33.4 G/DL (ref 32.3–36.5)
MCV RBC AUTO: 90.9 FL (ref 81.4–97.8)
MCV RBC AUTO: 90.9 FL (ref 81.4–97.8)
PHOSPHATE SERPL-MCNC: 3.6 MG/DL (ref 2.5–4.5)
PHOSPHATE SERPL-MCNC: 3.6 MG/DL (ref 2.5–4.5)
PLATELET # BLD AUTO: 196 K/UL (ref 164–446)
PLATELET # BLD AUTO: 196 K/UL (ref 164–446)
PMV BLD AUTO: 10.4 FL (ref 9–12.9)
PMV BLD AUTO: 10.4 FL (ref 9–12.9)
POTASSIUM SERPL-SCNC: 4.3 MMOL/L (ref 3.6–5.5)
POTASSIUM SERPL-SCNC: 4.3 MMOL/L (ref 3.6–5.5)
RBC # BLD AUTO: 4.81 M/UL (ref 4.7–6.1)
RBC # BLD AUTO: 4.81 M/UL (ref 4.7–6.1)
SODIUM SERPL-SCNC: 137 MMOL/L (ref 135–145)
SODIUM SERPL-SCNC: 137 MMOL/L (ref 135–145)
TRIGL SERPL-MCNC: 223 MG/DL (ref 0–149)
TRIGL SERPL-MCNC: 223 MG/DL (ref 0–149)
WBC # BLD AUTO: 8.8 K/UL (ref 4.8–10.8)
WBC # BLD AUTO: 8.8 K/UL (ref 4.8–10.8)

## 2025-01-30 PROCEDURE — 85027 COMPLETE CBC AUTOMATED: CPT

## 2025-01-30 PROCEDURE — 700111 HCHG RX REV CODE 636 W/ 250 OVERRIDE (IP): Mod: JZ | Performed by: INTERNAL MEDICINE

## 2025-01-30 PROCEDURE — 80061 LIPID PANEL: CPT

## 2025-01-30 PROCEDURE — 700102 HCHG RX REV CODE 250 W/ 637 OVERRIDE(OP): Performed by: INTERNAL MEDICINE

## 2025-01-30 PROCEDURE — 99232 SBSQ HOSP IP/OBS MODERATE 35: CPT | Performed by: INTERNAL MEDICINE

## 2025-01-30 PROCEDURE — 80048 BASIC METABOLIC PNL TOTAL CA: CPT

## 2025-01-30 PROCEDURE — 83735 ASSAY OF MAGNESIUM: CPT

## 2025-01-30 PROCEDURE — 84100 ASSAY OF PHOSPHORUS: CPT

## 2025-01-30 PROCEDURE — 97162 PT EVAL MOD COMPLEX 30 MIN: CPT

## 2025-01-30 PROCEDURE — A9270 NON-COVERED ITEM OR SERVICE: HCPCS | Performed by: INTERNAL MEDICINE

## 2025-01-30 PROCEDURE — 770000 HCHG ROOM/CARE - INTERMEDIATE ICU *

## 2025-01-30 PROCEDURE — 93010 ELECTROCARDIOGRAM REPORT: CPT | Performed by: INTERNAL MEDICINE

## 2025-01-30 PROCEDURE — 93306 TTE W/DOPPLER COMPLETE: CPT | Mod: 26 | Performed by: INTERNAL MEDICINE

## 2025-01-30 PROCEDURE — 93306 TTE W/DOPPLER COMPLETE: CPT

## 2025-01-30 PROCEDURE — 83036 HEMOGLOBIN GLYCOSYLATED A1C: CPT

## 2025-01-30 PROCEDURE — 97535 SELF CARE MNGMENT TRAINING: CPT

## 2025-01-30 PROCEDURE — 99233 SBSQ HOSP IP/OBS HIGH 50: CPT | Performed by: INTERNAL MEDICINE

## 2025-01-30 RX ADMIN — LOSARTAN POTASSIUM 25 MG: 25 TABLET, FILM COATED ORAL at 05:36

## 2025-01-30 RX ADMIN — ENOXAPARIN SODIUM 40 MG: 100 INJECTION SUBCUTANEOUS at 18:10

## 2025-01-30 RX ADMIN — NICOTINE POLACRILEX 2 MG: 2 GUM, CHEWING BUCCAL at 05:46

## 2025-01-30 RX ADMIN — PRASUGREL 10 MG: 10 TABLET, FILM COATED ORAL at 05:37

## 2025-01-30 RX ADMIN — ASPIRIN 81 MG: 81 TABLET, COATED ORAL at 05:37

## 2025-01-30 RX ADMIN — ACETAMINOPHEN 650 MG: 325 TABLET ORAL at 02:40

## 2025-01-30 RX ADMIN — NICOTINE POLACRILEX 2 MG: 2 GUM, CHEWING BUCCAL at 10:31

## 2025-01-30 RX ADMIN — NICOTINE POLACRILEX 2 MG: 2 GUM, CHEWING BUCCAL at 08:26

## 2025-01-30 RX ADMIN — METOPROLOL SUCCINATE 25 MG: 25 TABLET, EXTENDED RELEASE ORAL at 05:36

## 2025-01-30 RX ADMIN — SENNOSIDES AND DOCUSATE SODIUM 2 TABLET: 50; 8.6 TABLET ORAL at 18:10

## 2025-01-30 RX ADMIN — NICOTINE POLACRILEX 2 MG: 2 GUM, CHEWING BUCCAL at 02:00

## 2025-01-30 RX ADMIN — ATORVASTATIN CALCIUM 80 MG: 80 TABLET, FILM COATED ORAL at 20:26

## 2025-01-30 RX ADMIN — NICOTINE TRANSDERMAL SYSTEM 21 MG: 21 PATCH, EXTENDED RELEASE TRANSDERMAL at 05:37

## 2025-01-30 SDOH — ECONOMIC STABILITY: TRANSPORTATION INSECURITY
IN THE PAST 12 MONTHS, HAS LACK OF RELIABLE TRANSPORTATION KEPT YOU FROM MEDICAL APPOINTMENTS, MEETINGS, WORK OR FROM GETTING THINGS NEEDED FOR DAILY LIVING?: NO

## 2025-01-30 SDOH — ECONOMIC STABILITY: TRANSPORTATION INSECURITY
IN THE PAST 12 MONTHS, HAS THE LACK OF TRANSPORTATION KEPT YOU FROM MEDICAL APPOINTMENTS OR FROM GETTING MEDICATIONS?: NO

## 2025-01-30 ASSESSMENT — ENCOUNTER SYMPTOMS
DIZZINESS: 0
HEADACHES: 0
TINGLING: 0
DIARRHEA: 0
LOSS OF CONSCIOUSNESS: 0
VOMITING: 0
SHORTNESS OF BREATH: 0
STRIDOR: 0
COUGH: 0
CONSTIPATION: 0
NAUSEA: 0
FEVER: 0
ABDOMINAL PAIN: 0
FALLS: 0
PALPITATIONS: 0
CHILLS: 0
SPUTUM PRODUCTION: 0
WEAKNESS: 0
MYALGIAS: 0
DEPRESSION: 0

## 2025-01-30 ASSESSMENT — LIFESTYLE VARIABLES
AVERAGE NUMBER OF DAYS PER WEEK YOU HAVE A DRINK CONTAINING ALCOHOL: 0
EVER HAD A DRINK FIRST THING IN THE MORNING TO STEADY YOUR NERVES TO GET RID OF A HANGOVER: NO
TOTAL SCORE: 0
HAVE PEOPLE ANNOYED YOU BY CRITICIZING YOUR DRINKING: NO
CONSUMPTION TOTAL: NEGATIVE
DOES PATIENT WANT TO STOP DRINKING: NO
TOTAL SCORE: 0
HAVE YOU EVER FELT YOU SHOULD CUT DOWN ON YOUR DRINKING: NO
ON A TYPICAL DAY WHEN YOU DRINK ALCOHOL HOW MANY DRINKS DO YOU HAVE: 0
TOTAL SCORE: 0
EVER FELT BAD OR GUILTY ABOUT YOUR DRINKING: NO
ALCOHOL_USE: NO
HOW MANY TIMES IN THE PAST YEAR HAVE YOU HAD 5 OR MORE DRINKS IN A DAY: 0

## 2025-01-30 ASSESSMENT — COGNITIVE AND FUNCTIONAL STATUS - GENERAL
SUGGESTED CMS G CODE MODIFIER DAILY ACTIVITY: CH
SUGGESTED CMS G CODE MODIFIER MOBILITY: CH
MOBILITY SCORE: 24
DAILY ACTIVITIY SCORE: 24
MOBILITY SCORE: 24
SUGGESTED CMS G CODE MODIFIER MOBILITY: CH

## 2025-01-30 ASSESSMENT — FIBROSIS 4 INDEX: FIB4 SCORE: 1

## 2025-01-30 ASSESSMENT — SOCIAL DETERMINANTS OF HEALTH (SDOH)
WITHIN THE LAST YEAR, HAVE TO BEEN RAPED OR FORCED TO HAVE ANY KIND OF SEXUAL ACTIVITY BY YOUR PARTNER OR EX-PARTNER?: NO
WITHIN THE PAST 12 MONTHS, YOU WORRIED THAT YOUR FOOD WOULD RUN OUT BEFORE YOU GOT THE MONEY TO BUY MORE: NEVER TRUE
IN THE PAST 12 MONTHS, HAS THE ELECTRIC, GAS, OIL, OR WATER COMPANY THREATENED TO SHUT OFF SERVICE IN YOUR HOME?: NO
WITHIN THE PAST 12 MONTHS, YOU WORRIED THAT YOUR FOOD WOULD RUN OUT BEFORE YOU GOT THE MONEY TO BUY MORE: NEVER TRUE
WITHIN THE LAST YEAR, HAVE YOU BEEN KICKED, HIT, SLAPPED, OR OTHERWISE PHYSICALLY HURT BY YOUR PARTNER OR EX-PARTNER?: NO
WITHIN THE LAST YEAR, HAVE YOU BEEN AFRAID OF YOUR PARTNER OR EX-PARTNER?: NO
IN THE PAST 12 MONTHS, HAS THE ELECTRIC, GAS, OIL, OR WATER COMPANY THREATENED TO SHUT OFF SERVICE IN YOUR HOME?: NO
WITHIN THE PAST 12 MONTHS, THE FOOD YOU BOUGHT JUST DIDN'T LAST AND YOU DIDN'T HAVE MONEY TO GET MORE: NEVER TRUE
WITHIN THE LAST YEAR, HAVE YOU BEEN HUMILIATED OR EMOTIONALLY ABUSED IN OTHER WAYS BY YOUR PARTNER OR EX-PARTNER?: NO

## 2025-01-30 ASSESSMENT — PAIN DESCRIPTION - PAIN TYPE
TYPE: ACUTE PAIN

## 2025-01-30 ASSESSMENT — GAIT ASSESSMENTS
GAIT LEVEL OF ASSIST: INDEPENDENT
DISTANCE (FEET): 300

## 2025-01-30 ASSESSMENT — PATIENT HEALTH QUESTIONNAIRE - PHQ9
2. FEELING DOWN, DEPRESSED, IRRITABLE, OR HOPELESS: NOT AT ALL
SUM OF ALL RESPONSES TO PHQ9 QUESTIONS 1 AND 2: 0
1. LITTLE INTEREST OR PLEASURE IN DOING THINGS: NOT AT ALL

## 2025-01-30 NOTE — H&P
"Hospital Medicine History & Physical Note    Date of Service  1/29/2025    Primary Care Physician  No primary care provider on file.    Consultants  cardiology    Specialist Names: Dr. Del Real    Code Status  Full Code    Chief Complaint  Chief Complaint   Patient presents with    Chest Pain     Pt BIB care flight for sudden onset of 8/10 chest \"pressure\" this morning while pt was digging a hole at work. Pt found to have elevated in anterior and inferior leads. Flight gave pt 324 of ASA, SL nitro x 2 and 4 mg of zofran in route. Pt arrives to ER with no chest pain at this time.            History of Presenting Illness    Thom North is a 59 y.o. male with past medical history of tobacco abuse who presented to the hospital on 1/29/2025 with complaint of chest pain.  Patient reported that he was at work when he developed severe symptoms of chest pain that was located on the left side of his chest there is no expressive radiation of his chest pain.  He denies any association with times of palpitation and shortness of breath.  He denies any excessive sweating.  He denies any prior history of heart problem.  There is no specific aggravating or alleviating factors.  There is no other associated symptoms.  He reported he does not take any medications on regular basis and he chews tobacco.    ER course: Patient initially alerted as a STEMI related STEMI was discontinued but due to acute coronary symptoms cardiology evaluated him and he were emergently taken to cardiac Cath Lab and underwent stent placement.    I discussed about this admission with ER physician Dr. Culp    I discussed the plan of care with patient, family, and bedside RN.    Review of Systems  Review of Systems   Constitutional:  Negative for chills, fever and weight loss.   HENT:  Negative for hearing loss and tinnitus.    Eyes:  Negative for blurred vision, double vision, photophobia and pain.   Respiratory:  Negative for cough, sputum " production and shortness of breath.    Cardiovascular:  Positive for chest pain. Negative for palpitations, orthopnea and leg swelling.   Gastrointestinal:  Negative for abdominal pain, constipation, diarrhea, nausea and vomiting.   Genitourinary:  Negative for dysuria, frequency and urgency.   Musculoskeletal:  Negative for back pain, joint pain, myalgias and neck pain.   Skin:  Negative for rash.   Neurological:  Negative for dizziness, tingling, tremors, sensory change, speech change, focal weakness and headaches.   Psychiatric/Behavioral:  Negative for hallucinations and substance abuse.    All other systems reviewed and are negative.      Past Medical History  Tobacco abuse    Surgical History  Not pertinent    Family History     Family history reviewed with patient. There is no family history that is pertinent to the chief complaint.     Social History       Allergies  No Known Allergies    Medications  None       Physical Exam  Temp:  [36.2 °C (97.1 °F)-36.5 °C (97.7 °F)] 36.5 °C (97.7 °F)  Pulse:  [80-94] 85  Resp:  [11-26] 16  BP: (113-139)/(65-85) 114/69  SpO2:  [95 %-98 %] 96 %  Blood Pressure: 114/69   Temperature: 36.5 °C (97.7 °F)   Pulse: 85   Respiration: 16   Pulse Oximetry: 96 %       Physical Exam  Vitals reviewed.   Constitutional:       General: He is not in acute distress.     Appearance: He is obese.   HENT:      Head: Normocephalic and atraumatic. No contusion.      Right Ear: External ear normal.      Left Ear: External ear normal.      Nose: Nose normal.      Mouth/Throat:      Pharynx: No oropharyngeal exudate.   Eyes:      General:         Right eye: No discharge.         Left eye: No discharge.      Pupils: Pupils are equal, round, and reactive to light.   Cardiovascular:      Rate and Rhythm: Normal rate and regular rhythm.      Heart sounds: No murmur heard.     No friction rub. No gallop.   Pulmonary:      Effort: Pulmonary effort is normal.      Breath sounds: No wheezing or rhonchi.  "  Abdominal:      General: Bowel sounds are normal. There is no distension.      Palpations: Abdomen is soft.      Tenderness: There is no abdominal tenderness. There is no rebound.   Musculoskeletal:         General: No swelling or tenderness. Normal range of motion.      Cervical back: No rigidity. No muscular tenderness.   Skin:     General: Skin is warm and dry.      Coloration: Skin is not jaundiced.   Neurological:      General: No focal deficit present.      Mental Status: He is alert and oriented to person, place, and time.      Cranial Nerves: No cranial nerve deficit.      Sensory: No sensory deficit.      Comments: Following commands and moving all his extremities   Psychiatric:         Mood and Affect: Mood normal.         Laboratory:  Recent Labs     01/29/25  1317   WBC 11.5*   RBC 5.32   HEMOGLOBIN 16.1   HEMATOCRIT 48.3   MCV 90.8   MCH 30.3   MCHC 33.3   RDW 46.4   PLATELETCT 217   MPV 10.7     Recent Labs     01/29/25  1317   SODIUM 134*   POTASSIUM 4.4   CHLORIDE 98   CO2 20   GLUCOSE 151*   BUN 15   CREATININE 0.77   CALCIUM 9.6     Recent Labs     01/29/25  1317   ALTSGPT 19   ASTSGOT 14   ALKPHOSPHAT 60   TBILIRUBIN 0.7   LIPASE 23   GLUCOSE 151*     Recent Labs     01/29/25  1317   APTT 28.3   INR 0.99     No results for input(s): \"NTPROBNP\" in the last 72 hours.      Recent Labs     01/29/25  1317   TROPONINT 63*       Imaging:  DX-CHEST-LIMITED (1 VIEW)   Final Result      1.  Cardiomegaly. No overt failure or pneumonia.      CL-LEFT HEART CATHETERIZATION WITH POSSIBLE INTERVENTION    (Results Pending)       X-Ray:  My impression is: X-ray on my interpretation did not show any acute abnormalities    Assessment/Plan:  Justification for Admission Status  I anticipate this patient will require at least two midnights for appropriate medical management, necessitating inpatient admission because due to acute coronary syndromes and he underwent stent placement and also found to have new onset heart " failure with low EF on cardiac cath.    Patient will need a Intermediate Care (Adult and Pediatrics) bed on MEDICAL service .  The need is secondary to acute coronary syndrome.    * Acute coronary syndrome (HCC)- (present on admission)  Assessment & Plan  Patient presented with severe symptoms of chest pain and there was a concern for STEMI but later a STEMI was discontinued but due to left bundle branch block patient emergently taken to cardiac Cath Lab and he underwent stent placement and ostial LAD.  Started him on dual antiplatelet therapy  Ordered lipid profile and HbA1c for tomorrow morning  Admit to [facility] for close monitoring  Cardiology is following him.    Hyponatremia  Assessment & Plan  He found to have mild hyponatremia  Plan is to recheck sodium level tomorrow.    New onset of congestive heart failure (HCC)  Assessment & Plan  Cardiac cath showed ejection fraction of 35%.  Echocardiogram ordered to evaluate for ejection fraction.  Goal-directed medical therapy as tolerated.  Cardiology is following him.    Elevated troponin  Assessment & Plan  He found to have elevated troponin secondary to acute coronary syndrome.  Status post cardiac catheterization.    Hyperglycemia  Assessment & Plan  He found to hypoglycemia with blood glucose of 151  Ordered HbA1c for tomorrow.    Leukocytosis  Assessment & Plan  Most likely reactionary   Ordered CBC for tomorrow.    Tobacco abuse  Assessment & Plan  He expressed that he does not smoke tobacco but he chews tobacco.  I discussed with him regarding adverse event of chewing tobacco and he expressed understanding.  I ordered nicotine replacement therapy.    Time spent 4 minutes    Class 2 obesity due to excess calories with body mass index (BMI) of 38.0 to 38.9 in adult  Assessment & Plan  Lifestyle modifications   Outpatient follow-up      I discussed about this admission with ER physician Dr. Culp.    I reviewed cardiac catheterization report   Aram.    VTE prophylaxis: enoxaparin ppx

## 2025-01-30 NOTE — PROGRESS NOTES
[facility] Medicine Daily Progress Note    Date of Service  1/30/2025    Chief Complaint  Thom North is a 59 y.o. male admitted 1/29/2025 with chest pain.    Hospital Course  This is a 59-year-old male with no significant past medical history that presented to the emergency department secondary to acute onset chest pain.  Patient states it was on the left side of his chest without radiation, severe in nature.  Patient initially had code STEMI alert however this was discontinued.  Patient did have mild elevation in his troponin with ongoing chest pain as well as a left bundle branch block, uncertain of chronicity.  Because of this he was urgently taken to the Cath Lab.  In the Cath Lab, he was noted to have 80% occlusion proximal/ostial LAD stenosis, deemed culprit lesion.  His ejection fraction in Cath Lab was 35%.  Patient did have successful PCI of ostial LAD with excellent results.    Interval Problem Update  Patient has had resolution of his chest pain post stent placement.  He does not have any signs of fluid overload  Normal oxygen saturation    Overnight  No pain  NSR  -130    I have discussed this patient's plan of care and discharge plan at IDT rounds today with Case Management, Nursing, Nursing leadership, and other members of the IDT team.    Consultants/Specialty  cardiology    Code Status  Full Code    Disposition  The patient is not medically cleared for discharge to home or a post-acute facility.      I have placed the appropriate orders for post-discharge needs.    Review of Systems  Review of Systems   Constitutional:  Negative for chills, fever and malaise/fatigue.   HENT:  Negative for congestion.    Respiratory:  Negative for cough, sputum production, shortness of breath and stridor.    Cardiovascular:  Negative for chest pain, palpitations and leg swelling.   Gastrointestinal:  Negative for abdominal pain, constipation, diarrhea, nausea and vomiting.   Genitourinary:  Negative  for dysuria and urgency.   Musculoskeletal:  Negative for falls and myalgias.   Neurological:  Negative for dizziness, tingling, loss of consciousness, weakness and headaches.   Psychiatric/Behavioral:  Negative for depression and suicidal ideas.    All other systems reviewed and are negative.       Physical Exam  Temp:  [36.2 °C (97.1 °F)-36.5 °C (97.7 °F)] 36.5 °C (97.7 °F)  Pulse:  [71-94] 74  Resp:  [10-26] 16  BP: ()/(53-88) 127/63  SpO2:  [90 %-98 %] 93 %    Physical Exam  Vitals and nursing note reviewed.   Constitutional:       General: He is not in acute distress.     Appearance: He is well-developed. He is not toxic-appearing or diaphoretic.   HENT:      Head: Normocephalic and atraumatic.      Right Ear: External ear normal.      Left Ear: External ear normal.      Nose: Nose normal. No congestion or rhinorrhea.      Mouth/Throat:      Pharynx: No oropharyngeal exudate.   Eyes:      General:         Right eye: No discharge.         Left eye: No discharge.   Neck:      Trachea: No tracheal deviation.   Cardiovascular:      Rate and Rhythm: Normal rate and regular rhythm.      Heart sounds: No murmur heard.     No friction rub. No gallop.   Pulmonary:      Effort: Pulmonary effort is normal. No respiratory distress.      Breath sounds: Normal breath sounds. No stridor. No wheezing or rales.   Chest:      Chest wall: No tenderness.   Abdominal:      General: Bowel sounds are normal. There is no distension.      Palpations: Abdomen is soft.      Tenderness: There is no abdominal tenderness.   Musculoskeletal:         General: No tenderness. Normal range of motion.      Cervical back: Normal range of motion and neck supple. No edema or erythema.      Right lower leg: No edema.      Left lower leg: No edema.   Lymphadenopathy:      Cervical: No cervical adenopathy.   Skin:     General: Skin is warm and dry.      Findings: No erythema or rash.   Neurological:      Mental Status: He is alert and oriented to  person, place, and time.      Cranial Nerves: No cranial nerve deficit.   Psychiatric:         Mood and Affect: Mood normal.         Behavior: Behavior normal.         Thought Content: Thought content normal.         Judgment: Judgment normal.         Fluids    Intake/Output Summary (Last 24 hours) at 1/30/2025 0914  Last data filed at 1/29/2025 1900  Gross per 24 hour   Intake --   Output 1300 ml   Net -1300 ml        Laboratory  Recent Labs     01/29/25  1317 01/30/25  0054   WBC 11.5* 8.8   RBC 5.32 4.81   HEMOGLOBIN 16.1 14.6   HEMATOCRIT 48.3 43.7   MCV 90.8 90.9   MCH 30.3 30.4   MCHC 33.3 33.4   RDW 46.4 47.3   PLATELETCT 217 196   MPV 10.7 10.4     Recent Labs     01/29/25  1317 01/30/25  0054   SODIUM 134* 137   POTASSIUM 4.4 4.3   CHLORIDE 98 104   CO2 20 20   GLUCOSE 151* 120*   BUN 15 17   CREATININE 0.77 0.78   CALCIUM 9.6 9.3     Recent Labs     01/29/25  1317   APTT 28.3   INR 0.99         Recent Labs     01/30/25  0054   TRIGLYCERIDE 223*   HDL 27*   *       Imaging  DX-CHEST-LIMITED (1 VIEW)   Final Result      1.  Cardiomegaly. No overt failure or pneumonia.      CL-LEFT HEART CATHETERIZATION WITH POSSIBLE INTERVENTION    (Results Pending)   EC-ECHOCARDIOGRAM COMPLETE W/O CONT    (Results Pending)        Assessment/Plan  * Acute coronary syndrome (HCC)- (present on admission)  Assessment & Plan  Initially, code STEMI was called but this was discontinued   Patient had ongoing chest pain with a mild troponin elevation and a left bundle branch block   Because of this, patient was urgently taken to the Cath Lab   He was noted to have culprit lesion in the LAD, 80% stenosed   Now status post stent placement with excellent results   Patient did have an ejection fraction of 35% on cath, echocardiogram is pending   Cardiology did recommend dual antiplatelet therapy for at least 1 year   Patient does have elevated cholesterol level with decreased HDL, aggressive statin therapy  Goal-directed medical  therapy  Patient has been closely monitored in the IMCU due to significant risk of acute worsening  Continue to closely monitor for in-stent stenosis    Hyponatremia  Assessment & Plan  Resolved    New onset of congestive heart failure (HCC)  Assessment & Plan  Ejection fraction during cardiac catheterization was 35%  Post ACS  Echocardiogram pending  Now status post stent placement  Aggressive medical management    Elevated troponin  Assessment & Plan  Secondary to ACS  Now status post stent placement    Hyperglycemia  Assessment & Plan  Ongoing but improved post improvement in his condition  Hemoglobin A1c is 9.1  Considering ACS, patient would benefit from more tightly controlled blood glucose  Patient will need to be discharged on oral agent with close outpatient follow-up    Leukocytosis  Assessment & Plan  Reactive, no sign of bacterial infection  No need for antibiotics  Resolved on labs this morning    Tobacco abuse  Assessment & Plan  Cessation discussion given and admission    Class 2 obesity due to excess calories with body mass index (BMI) of 38.0 to 38.9 in adult  Assessment & Plan  Body mass index is 40.51 kg/m².  Patient would benefit from diet and exercise adjustment at discharge         VTE prophylaxis:    enoxaparin ppx      I have performed a physical exam and reviewed and updated ROS and Plan today (1/30/2025). In review of yesterday's note (1/29/2025), there are no changes except as documented above.

## 2025-01-30 NOTE — ASSESSMENT & PLAN NOTE
Ongoing but improved post improvement in his condition  Hemoglobin A1c is 9.1  Considering ACS, patient would benefit from more tightly controlled blood glucose  Patient will need to be discharged on oral agent with close outpatient follow-up

## 2025-01-30 NOTE — PROGRESS NOTES
4 Eyes Skin Assessment Completed by YUE Grimes and YUE Valdez.    Head WDL  Ears WDL  Nose WDL  Mouth WDL  Neck WDL  Breast/Chest WDL  Shoulder Blades WDL  Spine WDL  (R) Arm/Elbow/Hand WDL  (L) Arm/Elbow/Hand WDL  Abdomen Redness and Blanching navel area hernia  Groin WDL  Scrotum/Coccyx/Buttocks WDL  (R) Leg Redness and Blanching trace edema ankle  (L) Leg WDL  (R) Heel/Foot/Toe WDL  (L) Heel/Foot/Toe WDL          Devices In Places ECG, Blood Pressure Cuff, and Pulse Ox, TR band right wrist      Interventions In Place Pillows, Q2 Turns, Low Air Loss Mattress, Heels Loaded W/Pillows, and Pressure Redistribution Mattress    Possible Skin Injury No    Pictures Uploaded Into Epic N/A  Wound Consult Placed N/A  RN Wound Prevention Protocol Ordered No

## 2025-01-30 NOTE — ASSESSMENT & PLAN NOTE
Body mass index is 40.51 kg/m².  Patient would benefit from diet and exercise adjustment at discharge

## 2025-01-30 NOTE — PROGRESS NOTES
Cath 1/29/25:   HEMODYNAMICS              Ao: 96/72 mmHg              LEDP: 18 mmHg              Gradient on LV pullback: No     II. CORONARY ANGIOGRAPHY:  Left main coronary artery: Large-caliber bifurcating no CAD  Left anterior descending artery: Very large caliber wrapping around the apex.  Gives rise to a complex bifurcating large first diagonal system.  Proximal to the diagonal system is a moderately calcified acute appearing hazy eccentric 80 to 90% proximal and ostial stenosis.  Post PCI there is 0% residual stenosis and SABIHA-3 flow. Treated with 4 x 16 mm FREDY  Left circumflex coronary artery: Large-caliber vessel with mild nonobstructive CAD.  Right coronary artery: Large-caliber vessel with mild nonobstructive CAD.     III.  LEFT VENTRICULOGRAM:  LVEF AGUILAR PROJECTION: 35% with anterior and anteroapical akinesis    Recommendations:  ASA 81 mg po qd and prasugrel 10 mg po qd  Start toprol XL 25 mg po qd  Start losartan 25 mg po qd  (As likely more stunned myocardium, will no start the usual GDMT as may improve with revascularization).  Check formal echo

## 2025-01-30 NOTE — ASSESSMENT & PLAN NOTE
Initially, code STEMI was called but this was discontinued   Patient had ongoing chest pain with a mild troponin elevation and a left bundle branch block   Because of this, patient was urgently taken to the Cath Lab   He was noted to have culprit lesion in the LAD, 80% stenosed   Now status post stent placement with excellent results   Patient did have an ejection fraction of 35% on cath, echocardiogram is pending   Cardiology did recommend dual antiplatelet therapy for at least 1 year   Patient does have elevated cholesterol level with decreased HDL, aggressive statin therapy  Goal-directed medical therapy  Patient has been closely monitored in the IMCU due to significant risk of acute worsening  Continue to closely monitor for in-stent stenosis

## 2025-01-30 NOTE — PROGRESS NOTES
Pulmonary and Critical Care Medicine Progress Note    I had the pleasure of being asked to see this gentleman for admission disposition.  He may be safely admitted to the IM.    Alverto Alberto MD  Pulmonary and Critical Care Medicine

## 2025-01-30 NOTE — DISCHARGE INSTR - DIET
Heart-Healthy Nutrition Therapy    A heart-healthy diet is recommended to reduce your unhealthy blood cholesterol levels, manage high blood pressure, and lower your risk for heart disease.    To follow a heart-healthy diet,    Eat a balanced diet with whole grains, fruits and vegetables, and lean protein sources.  Achieve and maintain a healthy weight.  Choose heart-healthy unsaturated fats. Limit saturated fats, trans fats, and cholesterol intake. Eat more plant-based or vegetarian meals using beans and soy foods for protein.  Eat whole, unprocessed foods to limit the amount of sodium (salt) you eat.  Limit refined carbohydrates especially sugar, sweets and sugar-sweetened beverages.  If you drink alcohol, do so in moderation: one serving per day (women) and two servings per day (men).  One serving is equivalent to 12 ounces beer, 5 ounces wine, or 1.5 ounces distilled spirits    Tips for Choosing Heart-Healthy Fats    Choose lean protein and low-fat dairy foods to reduce saturated fat intake.    Saturated fat is usually found in animal-based protein and is associated with certain health risks. Saturated fat is the biggest contributor to raised low-density lipoprotein (LDL) cholesterol levels in the diet. Research shows that limiting saturated fat lowers unhealthy cholesterol levels. Eat no more than 5-6% of your total calories each day from saturated fat. Ask your registered dietitian nutritionist (RDN) to help you determine how much saturated fat is right for you.  There are many foods that do not contain large amounts of saturated fats. Swapping these foods to replace foods high in saturated fats will help you limit the saturated fat you eat and improve your cholesterol levels. You can also try eating more plant-based or vegetarian meals.        Avoid trans fats    Trans fats increase levels of LDL-cholesterol. Hydrogenated fat in processed foods is the main source of trans fats in foods.   Trans fats can be  found in stick margarine, shortening, processed sweets, baked goods, some fried foods, and packaged foods made with hydrogenated oils. Avoid foods with “partially hydrogenated oil” on the ingredient list such as: cookies, pastries, baked goods, biscuits, crackers, microwave popcorn, and frozen dinners.    Choose foods with heart healthy fats    Polyunsaturated and monounsaturated fat are unsaturated fats that may help lower your blood cholesterol level when used in place of saturated fat in your diet.  Ask your RDN about taking a dietary supplement with plant sterols and stanols to help lower your cholesterol level.  Research shows that substituting saturated fats with unsaturated fats is beneficial to cholesterol levels. Try these easy swaps:      Limit the amount of cholesterol you eat to less than 200 milligrams per day.    Cholesterol is a substance carried through the bloodstream via lipoproteins, which are known as “transporters” of fat. Some body functions need cholesterol to work properly, but too much cholesterol in the bloodstream can damage arteries and build up blood vessel linings (which can lead to heart attack and stroke). You should eat less than 200 milligrams cholesterol per day.  People respond differently to eating cholesterol. There is no test available right now that can figure out which people will respond more to dietary cholesterol and which will respond less. For individuals with high intake of dietary cholesterol, different types of increase (none, small, moderate, large) in LDL-cholesterol levels are all possible.    Food sources of cholesterol include egg yolks and organ meats such as liver, gizzards.  Limit egg yolks to two to four per week and avoid organ meats like liver and gizzards to control cholesterol intake.    Tips for Choosing Heart-Healthy Carbohydrates    Consume foods rich in viscous (soluble) fiber    Viscous, or soluble, fiber is found in the walls of plant cells. Viscous  fiber is found only in plant-based foods--animal-based foods like meat or dairy products do not contain fiber. In the stomach, viscous fibers absorb water and swell to form a thick, jelly-like mass. This helps to lower your unhealthy cholesterol  Rich sources of viscous fiber include asparagus, [place] sprouts, sweet potatoes, turnips, apricots, mangoes, oranges, legumes, barley, oats, and oat bran.  Eat at least 5 to 10 grams of viscous fiber each day. As you increase your fiber intake gradually, also increase the amount of water you drink. This will help prevent constipation.  If you have difficulty achieving this goal, ask your RDN about fiber laxatives. Choose fiber supplements made with viscous fibers such as psyllium seed husks or methylcellulose to help lower unhealthy cholesterol.    Limit refined carbohydrates    There are three types of carbohydrates: starches, sugar, and fiber. Some carbohydrates occur naturally in food, like the starches in rice or corn or the sugars in fruits and milk. Refined carbohydrates--foods with high amounts of simple sugars--can raise triglyceride levels. High triglyceride levels are associated with coronary heart disease.  Some examples of refined carbohydrate foods are table sugar, sweets, and beverages sweetened with added sugar.    Tips for Reducing Sodium (Salt)  Although sodium is important for your body to function, too much sodium can be harmful for people with high blood pressure.  As sodium and fluid buildup in your tissues and bloodstream, your blood pressure increases. High blood pressure may cause damage to other organs and increase your risk for a stroke.    Keep your salt intake to 2300 milligrams or less per day. Even if you take a pill for blood pressure or a water pill (diuretic) to remove fluid, it is still important to have less salt in your diet. Ask your RDN what amount of sodium is right for you.    Avoid processed foods.  Eat more fresh foods.  Fresh  "fruits and vegetables are naturally low in sodium, as well as frozen vegetables and fruits that have no added juices or sauces.  Fresh meats are lower in sodium than processed meats, such as beard, sausage, and hotdogs.  Read the nutrition label or ask your  to help you find a fresh meat that is low in sodium.  Eat less salt--at the table and when cooking.  A single teaspoon of table salt has 2,300 mg of sodium.  Leave the salt out of recipes for pasta, casseroles, and soups.  Ask your RDN how to cook your favorite recipes without sodium  Be a smart .  Look for food packages that say “salt-free” or “sodium-free.” These items contain less than 5 milligrams of sodium per serving.  “Very low-sodium” products contain less than 35 milligrams of sodium per serving.  “Low-sodium” products contain less than 140 milligrams of sodium per serving.   Beware of “reduced salt” or \"reduced sodium\" products.  These items may still be high in sodium. Check the nutrition label.   Add flavors to your food without adding sodium.  Try lemon juice, lime juice, fruit juice or vinegar.    Dry or fresh herbs add flavor. Try basil, bay leaf, dill, rosemary, parsley, sakshi, dry mustard, nutmeg, thyme, and paprika.  Pepper, red pepper flakes, and cayenne pepper can add spice to your meals without adding sodium. Hot sauce contains sodium, but if you use just a drop or two, it will not add up to much.  Buy a sodium-free seasoning blend or make your own at home.     Additional Lifestyle Tips    Achieve and maintain a healthy weight.  Talk with your RDN or your doctor about what is a healthy weight for you.  Set goals to reach and maintain that weight.   To lose weight, reduce your calorie intake along with increasing your physical activity. A weight loss of 10 to 15 pounds could reduce LDL-cholesterol by 5 milligrams per deciliter.  Participate in physical activity.    Talk with your health care team to find out what types of " physical activity are best for you. Set a plan to get about 30 minutes of exercise on most days.          Nutrition Counseling  Our expert team offers:   Medical Nutrition Therapy for Chronic Conditions   Weight Management   Diabetes Education and Management   Wellness Services   Body Composition Measurements   Gastrointestinal Health    Nutrition Counseling Services are located at:  [address], [place] [address]  For more information and to schedule a consultation, please call [phone].  A physician referral may be required by your insurance for coverage.

## 2025-01-30 NOTE — DIETARY
NUTRITION SERVICES: BMI - Pt with BMI >40 (=Body mass index is 40.51 kg/m².), morbid obesity. Weight loss counseling not appropriate in acute care setting.     RECOMMEND - If appropriate at DC please refer to outpatient nutrition services for weight management.      Nutrition Services: Cardiac Education Consult   Day 1 of admit.  Thom North is a 59 y.o. male with admitting DX of Acute coronary syndrome.    RD received referral for Cardiac education. RD able to provide information via discharge instructions which includes nutrition recommendations and tips to support a healthy dietary pattern that aligns within pt's current dx. This includes outpatient resources to [facility] for continued nutrition education and guidance as desired.     No other education needs identified at this time. Please consult RD otherwise for supplemental education or at the request of patient.    Please re-consult [facility]

## 2025-01-30 NOTE — ASSESSMENT & PLAN NOTE
Ejection fraction during cardiac catheterization was 35%  Post ACS  Echocardiogram pending  Now status post stent placement  Aggressive medical management

## 2025-01-30 NOTE — CARE PLAN
The patient is Stable - Low risk of patient condition declining or worsening    Shift Goals  Clinical Goals: hemodynamic stability  Patient Goals: sleep  Family Goals: john    Progress made toward(s) clinical / shift goals:    Problem: Knowledge Deficit - Standard  Goal: Patient and family/care givers will demonstrate understanding of plan of care, disease process/condition, diagnostic tests and medications  Outcome: Progressing     Problem: Pain - Standard  Goal: Alleviation of pain or a reduction in pain to the patient’s comfort goal  Outcome: Progressing       Patient is not progressing towards the following goals:N/A

## 2025-01-30 NOTE — PROGRESS NOTES
Cardiology Follow-up Consult Note    Date of Service:    1/30/2025      Consulting Physician: Ian Boo D.O.    Name:   Thom North   YOB: 1965  Age:   59 y.o.  male   MRN:   [id number]      Assessment/Recommendations:    Acute MI, s/p FREDY stent to LAD 1/29/25 without other residual disease. Discussed findings and recommendations  ASA 81 mg po qd indefinitely  Prasugrel 10 mg po qd x 1 year  Started on Toprol XL 25 mg po qpm and losartan 25 mg po qam  2.  LVEF decreased by LV gram. No signs of CHF   -echo pending   -on toprol and losartan.  Likely will recover function so hold off SLGTi, MRA, entresto   -Pt already has fu at [place] with cardiologist (his brother's)  3.  Lipid .     -atorvastatin 80 mg po qhs    4.  DM.  Pt wants to continue to try diet.  Needs fu with PCP.  Stressed good control of DM important for decreasing risk for CAD.    Disposition:  1 day monitor    Subjective:  No complaints, feels well.       Echo pending    Cath 1/29/25:   I.  HEMODYNAMICS              Ao: 96/72 mmHg              LEDP: 18 mmHg              Gradient on LV pullback: No     II. CORONARY ANGIOGRAPHY:  Left main coronary artery: Large-caliber bifurcating no CAD  Left anterior descending artery: Very large caliber wrapping around the apex.  Gives rise to a complex bifurcating large first diagonal system.  Proximal to the diagonal system is a moderately calcified acute appearing hazy eccentric 80 to 90% proximal and ostial stenosis.  Post PCI there is 0% residual stenosis and SABIHA-3 flow. Treated with 4 x 16 FREDY  Left circumflex coronary artery: Large-caliber vessel with mild nonobstructive CAD.  Right coronary artery: Large-caliber vessel with mild nonobstructive CAD.     III.  LEFT VENTRICULOGRAM:  LVEF AGUILAR PROJECTION: 35% with anterior and anteroapical akinesis  All other review of systems reviewed and negative.    Past medical, surgical, social, and family history reviewed and  unchanged from admission except as noted in assessment and plan.    No medications prior to admission.     Current Facility-Administered Medications   Medication Dose Frequency Provider Last Rate Last Admin    acetaminophen (Tylenol) tablet 650 mg  650 mg Q6HRS PRN Michael Chiu M.D.   650 mg at 01/30/25 0240    aspirin EC tablet 81 mg  81 mg DAILY Michael Chiu M.D.   81 mg at 01/30/25 0537    prasugrel (Effient) tablet 10 mg  10 mg DAILY Michael Chiu M.D.   10 mg at 01/30/25 0537    nicotine (Nicoderm) 21 MG/24HR 21 mg  21 mg Daily-0600 Nael Chilel M.D.   21 mg at 01/30/25 0537    And    nicotine polacrilex (Nicorette) 2 MG piece 2 mg  2 mg Q HOUR PRN Nael Chilel M.D.   2 mg at 01/30/25 0826    metoprolol SR (Toprol XL) tablet 25 mg  25 mg Q DAY Bindu Del Real M.D.   25 mg at 01/30/25 0536    losartan (Cozaar) tablet 25 mg  25 mg Q DAY Bindu Del Real M.D.   25 mg at 01/30/25 0536    atorvastatin (Lipitor) tablet 80 mg  80 mg QHS Bindu Del Real M.D.   80 mg at 01/29/25 2046    enoxaparin (Lovenox) inj 40 mg  40 mg DAILY AT 1800 Nael Chilel M.D.        senna-docusate (Pericolace Or Senokot S) 8.6-50 MG per tablet 2 Tablet  2 Tablet Q EVENING Nael Chilel M.D.        And    polyethylene glycol/lytes (Miralax) Packet 1 Packet  1 Packet QDAY PRN Nael Chilel M.D.       Last reviewed on 1/30/2025  2:33 AM by Nesha Mihsra R.N.     No Known Allergies      Intake/Output Summary (Last 24 hours) at 1/30/2025 0848  Last data filed at 1/29/2025 1900  Gross per 24 hour   Intake --   Output 1300 ml   Net -1300 ml        Physical Exam  Body mass index is 40.51 kg/m².  /63   Pulse 74   Temp 36.5 °C (97.7 °F) (Temporal)   Resp 16   Ht 1.829 m (6')   Wt (!) 135 kg (298 lb 11.6 oz)   SpO2 93%   Vitals:    01/30/25 0300 01/30/25 0400 01/30/25 0500 01/30/25 0600   BP: 125/70 136/63 124/68 127/63   Pulse: 76 73 71 74   Resp: 15 14 18 16   [facility]        [facility]       SpO2: 93% 92% 90% 93%   Weight:       Height:         Oxygen Therapy:  Pulse Oximetry: 93 %, O2 (LPM): 0, O2 Delivery Device: None - Room Air    Physical Exam  Constitutional:       Appearance: Normal appearance.   Neck:      Vascular: No JVD.   Cardiovascular:      Rate and Rhythm: Normal rate and regular rhythm.      Pulses: Normal pulses and intact distal pulses.      Heart sounds: Normal heart sounds, S1 normal and S2 normal. No murmur heard.     No friction rub. No S3 or S4 sounds.      Comments: Right wrist +1 radial no hematoma  Pulmonary:      Effort: Pulmonary effort is normal. No respiratory distress.      Breath sounds: Normal breath sounds. No wheezing or rales.   Skin:     General: Skin is warm and dry.   Neurological:      Mental Status: He is alert.         Labs (personally reviewed and notable for):   Lab Results   Component Value Date/Time    SODIUM 137 01/30/2025 12:54 AM    POTASSIUM 4.3 01/30/2025 12:54 AM    CHLORIDE 104 01/30/2025 12:54 AM    CO2 20 01/30/2025 12:54 AM    GLUCOSE 120 (H) 01/30/2025 12:54 AM    BUN 17 01/30/2025 12:54 AM    CREATININE 0.78 01/30/2025 12:54 AM      Lab Results   Component Value Date/Time    WBC 8.8 01/30/2025 12:54 AM    RBC 4.81 01/30/2025 12:54 AM    HEMOGLOBIN 14.6 01/30/2025 12:54 AM    HEMATOCRIT 43.7 01/30/2025 12:54 AM    MCV 90.9 01/30/2025 12:54 AM    MCH 30.4 01/30/2025 12:54 AM    MCHC 33.4 01/30/2025 12:54 AM    MPV 10.4 01/30/2025 12:54 AM    NEUTSPOLYS 81.90 (H) 01/29/2025 01:17 PM    LYMPHOCYTES 11.10 (L) 01/29/2025 01:17 PM    MONOCYTES 5.20 01/29/2025 01:17 PM    EOSINOPHILS 0.90 01/29/2025 01:17 PM    BASOPHILS 0.20 01/29/2025 01:17 PM      Lab Results   Component Value Date/Time    CHOLSTRLTOT 214 (H) 01/30/2025 12:54 AM     (H) 01/30/2025 12:54 AM    HDL 27 (A) 01/30/2025 12:54 AM    TRIGLYCERIDE 223 (H) 01/30/2025 12:54 AM       Lab Results   Component Value Date/Time    TROPONINT 63 (H) 01/29/2025 1317     No results  "found for: \"NTPROBNP\"    Telemetry Reviewed (personally reviewed) sinus rhythm    Radiology test Review:  DX-CHEST-LIMITED (1 VIEW)   Final Result      1.  Cardiomegaly. No overt failure or pneumonia.      CL-LEFT HEART CATHETERIZATION WITH POSSIBLE INTERVENTION    (Results Pending)   EC-ECHOCARDIOGRAM COMPLETE W/O CONT    (Results Pending)       I personally reviewed all blood test results, EKG tracings and images of his cardiac testings.     Bindu Del Real MD  Cardiologist, [facility]    Please note that this dictation was created using voice recognition software. I have made every reasonable attempt to correct obvious errors, but it is possible there are errors of grammar and possibly content that I did not discover before finalizing the note.   "

## 2025-01-31 ENCOUNTER — PATIENT OUTREACH (OUTPATIENT)
Dept: SCHEDULING | Facility: IMAGING CENTER | Age: 60
End: 2025-01-31
Payer: COMMERCIAL

## 2025-01-31 ENCOUNTER — PHARMACY VISIT (OUTPATIENT)
Dept: PHARMACY | Facility: MEDICAL CENTER | Age: 60
End: 2025-01-31
Payer: COMMERCIAL

## 2025-01-31 VITALS
TEMPERATURE: 97.4 F | OXYGEN SATURATION: 92 % | BODY MASS INDEX: 40.46 KG/M2 | BODY MASS INDEX: 40.46 KG/M2 | WEIGHT: 298.72 LBS | DIASTOLIC BLOOD PRESSURE: 72 MMHG | SYSTOLIC BLOOD PRESSURE: 122 MMHG | HEART RATE: 82 BPM | SYSTOLIC BLOOD PRESSURE: 122 MMHG | DIASTOLIC BLOOD PRESSURE: 72 MMHG | TEMPERATURE: 97.4 F | OXYGEN SATURATION: 92 % | HEIGHT: 72 IN | HEART RATE: 82 BPM | RESPIRATION RATE: 17 BRPM | WEIGHT: 298.72 LBS | RESPIRATION RATE: 17 BRPM | HEIGHT: 72 IN

## 2025-01-31 PROBLEM — D72.829 LEUKOCYTOSIS: Status: RESOLVED | Noted: 2025-01-29 | Resolved: 2025-01-31

## 2025-01-31 PROBLEM — I24.9 ACUTE CORONARY SYNDROME (HCC): Status: RESOLVED | Noted: 2025-01-29 | Resolved: 2025-01-31

## 2025-01-31 PROBLEM — E87.1 HYPONATREMIA: Status: RESOLVED | Noted: 2025-01-29 | Resolved: 2025-01-31

## 2025-01-31 PROBLEM — R79.89 ELEVATED TROPONIN: Status: RESOLVED | Noted: 2025-01-29 | Resolved: 2025-01-31

## 2025-01-31 LAB
ANION GAP SERPL CALC-SCNC: 16 MMOL/L (ref 7–16)
ANION GAP SERPL CALC-SCNC: 16 MMOL/L (ref 7–16)
BUN SERPL-MCNC: 17 MG/DL (ref 8–22)
BUN SERPL-MCNC: 17 MG/DL (ref 8–22)
CALCIUM SERPL-MCNC: 9.1 MG/DL (ref 8.5–10.5)
CALCIUM SERPL-MCNC: 9.1 MG/DL (ref 8.5–10.5)
CHLORIDE SERPL-SCNC: 104 MMOL/L (ref 96–112)
CHLORIDE SERPL-SCNC: 104 MMOL/L (ref 96–112)
CO2 SERPL-SCNC: 18 MMOL/L (ref 20–33)
CO2 SERPL-SCNC: 18 MMOL/L (ref 20–33)
CREAT SERPL-MCNC: 0.84 MG/DL (ref 0.5–1.4)
CREAT SERPL-MCNC: 0.84 MG/DL (ref 0.5–1.4)
GFR SERPLBLD CREATININE-BSD FMLA CKD-EPI: 99 ML/MIN/1.73 M 2
GFR SERPLBLD CREATININE-BSD FMLA CKD-EPI: 99 ML/MIN/1.73 M 2
GLUCOSE SERPL-MCNC: 145 MG/DL (ref 65–99)
GLUCOSE SERPL-MCNC: 145 MG/DL (ref 65–99)
POTASSIUM SERPL-SCNC: 4.4 MMOL/L (ref 3.6–5.5)
POTASSIUM SERPL-SCNC: 4.4 MMOL/L (ref 3.6–5.5)
SODIUM SERPL-SCNC: 138 MMOL/L (ref 135–145)
SODIUM SERPL-SCNC: 138 MMOL/L (ref 135–145)

## 2025-01-31 PROCEDURE — A9270 NON-COVERED ITEM OR SERVICE: HCPCS | Performed by: INTERNAL MEDICINE

## 2025-01-31 PROCEDURE — 700102 HCHG RX REV CODE 250 W/ 637 OVERRIDE(OP): Performed by: INTERNAL MEDICINE

## 2025-01-31 PROCEDURE — RXMED WILLOW AMBULATORY MEDICATION CHARGE: Performed by: INTERNAL MEDICINE

## 2025-01-31 PROCEDURE — 99232 SBSQ HOSP IP/OBS MODERATE 35: CPT | Performed by: INTERNAL MEDICINE

## 2025-01-31 PROCEDURE — 99239 HOSP IP/OBS DSCHRG MGMT >30: CPT | Performed by: INTERNAL MEDICINE

## 2025-01-31 PROCEDURE — 80048 BASIC METABOLIC PNL TOTAL CA: CPT

## 2025-01-31 RX ORDER — METOPROLOL SUCCINATE 25 MG/1
25 TABLET, EXTENDED RELEASE ORAL DAILY
Qty: 30 TABLET | Refills: 3 | Status: SHIPPED | OUTPATIENT
Start: 2025-02-01 | End: 2025-01-31

## 2025-01-31 RX ORDER — NICOTINE 21 MG/24HR
1 PATCH, TRANSDERMAL 24 HOURS TRANSDERMAL EVERY 24 HOURS
Qty: 28 PATCH | Refills: 0 | Status: SHIPPED | OUTPATIENT
Start: 2025-01-31

## 2025-01-31 RX ORDER — PRASUGREL 10 MG/1
10 TABLET, FILM COATED ORAL DAILY
Qty: 30 TABLET | Refills: 3 | Status: SHIPPED | OUTPATIENT
Start: 2025-02-01

## 2025-01-31 RX ORDER — ASPIRIN 81 MG/1
81 TABLET ORAL DAILY
COMMUNITY
Start: 2025-02-01 | End: 2025-01-31

## 2025-01-31 RX ORDER — NICOTINE 21 MG/24HR
1 PATCH, TRANSDERMAL 24 HOURS TRANSDERMAL EVERY 24 HOURS
Qty: 30 PATCH | Refills: 0 | Status: SHIPPED | OUTPATIENT
Start: 2025-01-31 | End: 2025-01-31

## 2025-01-31 RX ORDER — METOPROLOL SUCCINATE 25 MG/1
25 TABLET, EXTENDED RELEASE ORAL DAILY
Qty: 30 TABLET | Refills: 3 | Status: SHIPPED | OUTPATIENT
Start: 2025-02-01

## 2025-01-31 RX ORDER — ATORVASTATIN CALCIUM 80 MG/1
80 TABLET, FILM COATED ORAL
Qty: 100 TABLET | Refills: 3 | Status: SHIPPED | OUTPATIENT
Start: 2025-01-31 | End: 2025-01-31

## 2025-01-31 RX ORDER — LOSARTAN POTASSIUM 25 MG/1
25 TABLET ORAL DAILY
Qty: 100 TABLET | Refills: 3 | Status: SHIPPED | OUTPATIENT
Start: 2025-02-01 | End: 2025-01-31

## 2025-01-31 RX ORDER — ASPIRIN 81 MG/1
81 TABLET ORAL DAILY
COMMUNITY
Start: 2025-02-01

## 2025-01-31 RX ORDER — ATORVASTATIN CALCIUM 80 MG/1
80 TABLET, FILM COATED ORAL
Qty: 100 TABLET | Refills: 3 | Status: SHIPPED | OUTPATIENT
Start: 2025-01-31 | End: 2026-03-07

## 2025-01-31 RX ORDER — LOSARTAN POTASSIUM 25 MG/1
25 TABLET ORAL DAILY
Qty: 100 TABLET | Refills: 3 | Status: SHIPPED | OUTPATIENT
Start: 2025-02-01 | End: 2026-03-08

## 2025-01-31 RX ORDER — PRASUGREL 10 MG/1
10 TABLET, FILM COATED ORAL DAILY
Qty: 30 TABLET | Refills: 3 | Status: SHIPPED | OUTPATIENT
Start: 2025-02-01 | End: 2025-01-31

## 2025-01-31 RX ADMIN — ASPIRIN 81 MG: 81 TABLET, COATED ORAL at 05:22

## 2025-01-31 RX ADMIN — METOPROLOL SUCCINATE 25 MG: 25 TABLET, EXTENDED RELEASE ORAL at 05:22

## 2025-01-31 RX ADMIN — PRASUGREL 10 MG: 10 TABLET, FILM COATED ORAL at 05:21

## 2025-01-31 RX ADMIN — LOSARTAN POTASSIUM 25 MG: 25 TABLET, FILM COATED ORAL at 05:21

## 2025-01-31 RX ADMIN — NICOTINE TRANSDERMAL SYSTEM 21 MG: 21 PATCH, EXTENDED RELEASE TRANSDERMAL at 05:22

## 2025-01-31 ASSESSMENT — PAIN DESCRIPTION - PAIN TYPE
TYPE: ACUTE PAIN
TYPE: ACUTE PAIN

## 2025-01-31 NOTE — PROGRESS NOTES
Bedside report received. Cardiac monitor in place. POC and rounding discussed. No acute distress noted. Call light and personal belongings within reach.   12 hour chart check complete.

## 2025-01-31 NOTE — DISCHARGE INSTRUCTIONS
F/U with PCP within 1 week  F/U with cardiology in 1-2 weeks  ER if worsening chest pain, dyspnea or other emergency      Diagnosis:  Acute Coronary Syndrome (ACS) is a diagnosis that encompasses cardiac-related chest pain and heart attack. ACS occurs when the blood flow to the heart muscle is severely reduced or cut off completely due to a slow process called atherosclerosis.  Atherosclerosis is a disease in which the coronary arteries become narrow from a buildup of fat, cholesterol, and other substances that combine to form plaque. If the plaque breaks, a blood clot will form and block the blood flow to the heart muscle. This lack of blood flow can cause damage or death to the heart muscle which is called a heart attack or Myocardial Infarction (MI). There are two different types of MIs:  ST Elevation Myocardial Infarction or STEMI (the most severe type of heart attack) and Non-ST Elevation Myocardial Infarction or NSTEMI.    Treatment Plan:  Cardiac Diet  - Low fat, low salt, low cholesterol   Cardiac Rehab  - Your doctor has ordered you a referral to [facility].  Call [phone] to schedule an appointment.  Attend my follow-up appointment with my Cardiologist.  Take my medications as prescribed by my doctor  Exercise daily  Quit Smoking, Lower my bad cholesterol and raise my good cholesterol, lower my blood pressure, and Reduce stress    Medications:  Certain medications are used to treat ACS.  Remember to always take medications as prescribed and never stop talking medications unless told by your doctor.    You have been prescribed the following medicatons:    Aspirin - Aspirin is used as a blood thinning medication and you will require this medication indefinitely.  Anti-platelet/blood thinner - Your Anti-platelet/Blood thinning medication is called Prasugrel (Effient), and is used in combination with aspirin to prevent clots from forming in your heart and/or around your stent.  Your doctor will determine how  long you need to be on this medicine.  Beta-Blocker - Beta-Blocker metoprolol is used to lower blood pressure and heart rate, and/or helps your heart heal after a heart attack.  Statin - Statin Atorvaststin is used to lower cholesterol.  Angiotensin Receptor Blocker (ARB) - Angiotensin Receptor Blocker losartan is used to lower blood pressure and treat heart failure.

## 2025-01-31 NOTE — DISCHARGE SUMMARY
"Discharge Summary    CHIEF COMPLAINT ON ADMISSION  Chief Complaint   Patient presents with    Chest Pain     Pt [facility] care flight for sudden onset of 8/10 chest \"pressure\" this morning while pt was digging a hole at work. Pt found to have elevated in anterior and inferior leads. Flight gave pt 324 of ASA, SL nitro x 2 and 4 mg of zofran in route. Pt arrives to ER with no chest pain at this time.            Reason for Admission  STEMI     Admission Date  1/29/2025    CODE STATUS  Full Code    HPI & HOSPITAL COURSE  This is a 59 y.o. male here with ***   No notes on file    Therefore, he is discharged in {DC Condition:51747781} and stable condition {Discharge Dispositions:3622252}.    {IP Discharge Criteria:57051}    Discharge Date  ***    FOLLOW UP ITEMS POST DISCHARGE  ***    DISCHARGE DIAGNOSES  Principal Problem (Resolved):    Acute coronary syndrome (HCC) (POA: Yes)  Active Problems:    Class 2 obesity due to excess calories with body mass index (BMI) of 38.0 to 38.9 in adult (POA: Unknown)    Tobacco abuse (POA: Unknown)    Hyperglycemia (POA: Unknown)    New onset of congestive heart failure (HCC) (POA: Unknown)  Resolved Problems:    Leukocytosis (POA: Unknown)    Elevated troponin (POA: Unknown)    Hyponatremia (POA: Unknown)      FOLLOW UP  No future appointments.  [facility]  [address]  [address]  [facility] [id number] [phone]  Follow up  Your doctor has referred you for Cardiac Rehab which is important in your recovery. Please call to make an appointment or speak with your local doctor to find a program in your area.      MEDICATIONS ON DISCHARGE     Medication List        START taking these medications        Instructions   aspirin 81 MG EC tablet  Start taking on: February 1, 2025   Take 1 Tablet by mouth every day.  Dose: 81 mg     atorvastatin 80 MG tablet  Commonly known as: Lipitor   Take 1 Tablet by mouth at bedtime.  Dose: 80 mg     losartan 25 MG Tabs  Start " taking on: 2025  Commonly known as: Cozaar   Take 1 Tablet by mouth every day.  Dose: 25 mg     metoprolol SR 25 MG Tb24  Start taking on: 2025  Commonly known as: Toprol XL   Take 1 Tablet by mouth every day.  Dose: 25 mg     nicotine 21 MG/24HR Pt24  Commonly known as: Nicoderm   Place 1 Patch on the skin every 24 hours.  Dose: 1 Patch     prasugrel 10 MG Tabs  Start taking on: 2025  Commonly known as: Effient   Take 1 Tablet by mouth every day.  Dose: 10 mg              Allergies  No Known Allergies    DIET  Orders Placed This Encounter   Procedures    Diet Order Diet: Cardiac     Standing Status:   Standing     Number of Occurrences:   1     Order Specific Question:   Diet:     Answer:   Cardiac [6]       ACTIVITY  {Activity Tolerance:31408077}  {Weigh Bearin}    CONSULTATIONS  ***    PROCEDURES  ***    LABORATORY  Lab Results   Component Value Date    SODIUM 138 2025    POTASSIUM 4.4 2025    CHLORIDE 104 2025    CO2 18 (L) 2025    GLUCOSE 145 (H) 2025    BUN 17 2025    CREATININE 0.84 2025        Lab Results   Component Value Date    WBC 8.8 2025    HEMOGLOBIN 14.6 2025    HEMATOCRIT 43.7 2025    PLATELETCT 196 2025        Total time of the discharge process exceeds *** minutes.   Allergies    DIET  Orders Placed This Encounter   Procedures    Diet Order Diet: Cardiac     Standing Status:   Standing     Number of Occurrences:   1     Order Specific Question:   Diet:     Answer:   Cardiac [6]       ACTIVITY  As tolerated.  Weight bearing as tolerated    CONSULTATIONS  Cardiology    PROCEDURES  Left heart cath showed 80% acute proximal/ostial LAD stenosis, deemed the culprit lesion, successful PCI of ostial LAD with excellent results    LABORATORY  Lab Results   Component Value Date    SODIUM 138 01/31/2025    POTASSIUM 4.4 01/31/2025    CHLORIDE 104 01/31/2025    CO2 18 (L) 01/31/2025    GLUCOSE 145 (H) 01/31/2025    BUN 17 01/31/2025    CREATININE 0.84 01/31/2025        Lab Results   Component Value Date    WBC 8.8 01/30/2025    HEMOGLOBIN 14.6 01/30/2025    HEMATOCRIT 43.7 01/30/2025    PLATELETCT 196 01/30/2025        Total time of the discharge process exceeds 38 minutes.

## 2025-01-31 NOTE — PROGRESS NOTES
Patient updated on discharge POC. Appointments for follow up scheduled. PIVx2 removed. Patient dressed with ride home at bedside. Meds to beds ready for pickup. Cardiac monitor removed and tech notified. Awaiting DCL.

## 2025-01-31 NOTE — PROGRESS NOTES
Cardiology Follow-up Consult Note    Date of Service:    1/31/2025      Consulting Physician: Ian Boo D.O.    Name:   Thom North   YOB: 1965  Age:   59 y.o.  male   MRN:   [id number]      Assessment/Recommendations:    Acute MI sp stent to LAD 1/29/25 without other residual disease.  No arrhythmias.    ECASA 81 mg po qd indefinitely  Prasugrel 10 mg po qd x 1 year  Toprol XL 25 mg po qam and losartan 25 mg po qpm  Atorvastation  Pt wants to fu with [facility] cardiology and he was instructed to call for fu appointment in 1-2 weeks  Echo with normal LVEF, no signs of fluid overload.    Disposition:  Stable for discharge from cardiac standpoint.    Subjective:  No complaints feels wells    Echo 1/30/35 my personal interpretation is normal LV/RV systolic function.  No significant valvular stenosis or regurgitation.     All other review of systems reviewed and negative.    Past medical, surgical, social, and family history reviewed and unchanged from admission except as noted in assessment and plan.    No medications prior to admission.     Current Facility-Administered Medications   Medication Dose Frequency Provider Last Rate Last Admin    acetaminophen (Tylenol) tablet 650 mg  650 mg Q6HRS PRN Michael Chiu M.D.   650 mg at 01/30/25 0240    aspirin EC tablet 81 mg  81 mg DAILY Michael Chiu M.D.   81 mg at 01/31/25 0522    prasugrel (Effient) tablet 10 mg  10 mg DAILY Michael Chiu M.D.   10 mg at 01/31/25 0521    nicotine (Nicoderm) 21 MG/24HR 21 mg  21 mg Daily-0600 Nael Chilel M.D.   21 mg at 01/31/25 0522    And    nicotine polacrilex (Nicorette) 2 MG piece 2 mg  2 mg Q HOUR PRN Nael Chilel M.D.   2 mg at 01/30/25 1031    metoprolol SR (Toprol XL) tablet 25 mg  25 mg Q DAY Bindu Del Real M.D.   25 mg at 01/31/25 0522    losartan (Cozaar) tablet 25 mg  25 mg Q DAY Bindu Del Real M.D.   25 mg at 01/31/25 0521    atorvastatin (Lipitor) tablet 80 mg   80 mg QHS Bindu Del Real M.D.   80 mg at 01/30/25 2026    enoxaparin (Lovenox) inj 40 mg  40 mg DAILY AT 1800 Nael Chilel M.D.   40 mg at 01/30/25 1810    senna-docusate (Pericolace Or Senokot S) 8.6-50 MG per tablet 2 Tablet  2 Tablet Q EVENING Nael Chilel M.D.   2 Tablet at 01/30/25 1810    And    polyethylene glycol/lytes (Miralax) Packet 1 Packet  1 Packet QDAY PRN Nael Chilel M.D.       Last reviewed on 1/30/2025  2:33 AM by Nesha Mishra R.N.     No Known Allergies      Intake/Output Summary (Last 24 hours) at 1/31/2025 0738  Last data filed at 1/30/2025 1500  Gross per 24 hour   Intake 1840 ml   Output --   Net 1840 ml        Physical Exam  Body mass index is 40.51 kg/m².  /63   Pulse 70   Temp 36.4 °C (97.6 °F) (Temporal)   Resp 19   Ht 1.829 m (6')   Wt (!) 135 kg (298 lb 11.6 oz)   SpO2 92%   Vitals:    01/30/25 2200 01/31/25 0000 01/31/25 0200 01/31/25 0600   BP: 100/56 134/68 104/58 115/63   Pulse: 73 62 66 70   Resp:       Temp:       TempSrc:       SpO2: 92% 96% 92%    Weight:       Height:         Oxygen Therapy:  Pulse Oximetry: 92 %    Physical Exam    Labs (personally reviewed and notable for):   Lab Results   Component Value Date/Time    SODIUM 138 01/31/2025 03:33 AM    POTASSIUM 4.4 01/31/2025 03:33 AM    CHLORIDE 104 01/31/2025 03:33 AM    CO2 18 (L) 01/31/2025 03:33 AM    GLUCOSE 145 (H) 01/31/2025 03:33 AM    BUN 17 01/31/2025 03:33 AM    CREATININE 0.84 01/31/2025 03:33 AM      Lab Results   Component Value Date/Time    WBC 8.8 01/30/2025 12:54 AM    RBC 4.81 01/30/2025 12:54 AM    HEMOGLOBIN 14.6 01/30/2025 12:54 AM    HEMATOCRIT 43.7 01/30/2025 12:54 AM    MCV 90.9 01/30/2025 12:54 AM    MCH 30.4 01/30/2025 12:54 AM    MCHC 33.4 01/30/2025 12:54 AM    MPV 10.4 01/30/2025 12:54 AM    NEUTSPOLYS 81.90 (H) 01/29/2025 01:17 PM    LYMPHOCYTES 11.10 (L) 01/29/2025 01:17 PM    MONOCYTES 5.20 01/29/2025 01:17 PM    EOSINOPHILS 0.90 01/29/2025 01:17  "PM    BASOPHILS 0.20 01/29/2025 01:17 PM      Lab Results   Component Value Date/Time    CHOLSTRLTOT 214 (H) 01/30/2025 12:54 AM     (H) 01/30/2025 12:54 AM    HDL 27 (A) 01/30/2025 12:54 AM    TRIGLYCERIDE 223 (H) 01/30/2025 12:54 AM       Lab Results   Component Value Date/Time    TROPONINT 63 (H) 01/29/2025 1317     No results found for: \"NTPROBNP\"    Telemetry Reviewed (personally reviewed) sinus    Radiology test Review:  EC-ECHOCARDIOGRAM COMPLETE W/O CONT   Final Result      DX-CHEST-LIMITED (1 VIEW)   Final Result      1.  Cardiomegaly. No overt failure or pneumonia.      CL-LEFT HEART CATHETERIZATION WITH POSSIBLE INTERVENTION    (Results Pending)       I personally reviewed all blood test results, EKG tracings and images of his cardiac testings.     Bindu Del Real MD  Cardiologist, [facility]    Please note that this dictation was created using voice recognition software. I have made every reasonable attempt to correct obvious errors, but it is possible there are errors of grammar and possibly content that I did not discover before finalizing the note.   "

## 2025-01-31 NOTE — CARE PLAN
The patient is Watcher - Medium risk of patient condition declining or worsening    Shift Goals  Clinical Goals: hemodyamic stability  Patient Goals: discharge hopme  Family Goals: ASH    Progress made toward(s) clinical / shift goals:    Problem: Knowledge Deficit - Standard  Goal: Patient and family/care givers will demonstrate understanding of plan of care, disease process/condition, diagnostic tests and medications  Outcome: Progressing     Problem: Pain - Standard  Goal: Alleviation of pain or a reduction in pain to the patient’s comfort goal  Outcome: Progressing       Patient is not progressing towards the following goals:

## 2025-02-12 ENCOUNTER — APPOINTMENT (OUTPATIENT)
Dept: CARDIOLOGY | Facility: MEDICAL CENTER | Age: 60
End: 2025-02-12
Payer: COMMERCIAL